# Patient Record
Sex: MALE | Race: WHITE | Employment: FULL TIME | URBAN - NONMETROPOLITAN AREA
[De-identification: names, ages, dates, MRNs, and addresses within clinical notes are randomized per-mention and may not be internally consistent; named-entity substitution may affect disease eponyms.]

---

## 2021-07-18 ENCOUNTER — HOSPITAL ENCOUNTER (INPATIENT)
Age: 51
LOS: 4 days | Discharge: HOME | DRG: 417 | End: 2021-07-22
Attending: INTERNAL MEDICINE | Admitting: INTERNAL MEDICINE
Payer: COMMERCIAL

## 2021-07-18 ENCOUNTER — APPOINTMENT (OUTPATIENT)
Dept: CT IMAGING | Age: 51
DRG: 417 | End: 2021-07-18
Payer: COMMERCIAL

## 2021-07-18 ENCOUNTER — APPOINTMENT (OUTPATIENT)
Dept: GENERAL RADIOLOGY | Age: 51
DRG: 417 | End: 2021-07-18
Payer: COMMERCIAL

## 2021-07-18 DIAGNOSIS — Z90.49 S/P LAPAROSCOPIC CHOLECYSTECTOMY: ICD-10-CM

## 2021-07-18 DIAGNOSIS — K80.20 CALCULUS OF GALLBLADDER WITHOUT CHOLECYSTITIS WITHOUT OBSTRUCTION: ICD-10-CM

## 2021-07-18 DIAGNOSIS — K85.90 ACUTE PANCREATITIS WITHOUT INFECTION OR NECROSIS, UNSPECIFIED PANCREATITIS TYPE: Primary | ICD-10-CM

## 2021-07-18 PROBLEM — K85.10 PANCREATITIS, GALLSTONE: Status: ACTIVE | Noted: 2021-07-18

## 2021-07-18 LAB
-: ABNORMAL
ABSOLUTE EOS #: 0.23 K/UL (ref 0–0.44)
ABSOLUTE IMMATURE GRANULOCYTE: 0.05 K/UL (ref 0–0.3)
ABSOLUTE LYMPH #: 1.03 K/UL (ref 1.1–3.7)
ABSOLUTE MONO #: 1.28 K/UL (ref 0.1–1.2)
ALBUMIN SERPL-MCNC: 4.4 G/DL (ref 3.5–5.2)
ALBUMIN/GLOBULIN RATIO: 1.2 (ref 1–2.5)
ALP BLD-CCNC: 76 U/L (ref 40–129)
ALT SERPL-CCNC: 11 U/L (ref 5–41)
AMORPHOUS: ABNORMAL
AMYLASE: 67 U/L (ref 28–100)
ANION GAP SERPL CALCULATED.3IONS-SCNC: 13 MMOL/L (ref 9–17)
AST SERPL-CCNC: 25 U/L
BACTERIA: ABNORMAL
BASOPHILS # BLD: 0 % (ref 0–2)
BASOPHILS ABSOLUTE: 0.03 K/UL (ref 0–0.2)
BILIRUB SERPL-MCNC: 1.6 MG/DL (ref 0.3–1.2)
BILIRUBIN URINE: NEGATIVE
BUN BLDV-MCNC: 10 MG/DL (ref 6–20)
BUN/CREAT BLD: 17 (ref 9–20)
CALCIUM SERPL-MCNC: 9.3 MG/DL (ref 8.6–10.4)
CASTS UA: ABNORMAL /LPF
CHLORIDE BLD-SCNC: 95 MMOL/L (ref 98–107)
CO2: 23 MMOL/L (ref 20–31)
COLOR: YELLOW
COMMENT UA: ABNORMAL
CREAT SERPL-MCNC: 0.59 MG/DL (ref 0.7–1.2)
CRYSTALS, UA: ABNORMAL /HPF
DIFFERENTIAL TYPE: ABNORMAL
EOSINOPHILS RELATIVE PERCENT: 2 % (ref 1–4)
EPITHELIAL CELLS UA: ABNORMAL /HPF (ref 0–5)
GFR AFRICAN AMERICAN: >60 ML/MIN
GFR NON-AFRICAN AMERICAN: >60 ML/MIN
GFR SERPL CREATININE-BSD FRML MDRD: ABNORMAL ML/MIN/{1.73_M2}
GFR SERPL CREATININE-BSD FRML MDRD: ABNORMAL ML/MIN/{1.73_M2}
GLUCOSE BLD-MCNC: 104 MG/DL (ref 70–99)
GLUCOSE URINE: NEGATIVE
HCT VFR BLD CALC: 44.8 % (ref 40.7–50.3)
HEMOGLOBIN: 15.5 G/DL (ref 13–17)
IMMATURE GRANULOCYTES: 0 %
KETONES, URINE: NEGATIVE
LACTIC ACID, WHOLE BLOOD: NORMAL MMOL/L (ref 0.7–2.1)
LACTIC ACID: 0.7 MMOL/L (ref 0.5–2.2)
LEUKOCYTE ESTERASE, URINE: NEGATIVE
LIPASE: 120 U/L (ref 13–60)
LYMPHOCYTES # BLD: 9 % (ref 24–43)
MCH RBC QN AUTO: 31 PG (ref 25.2–33.5)
MCHC RBC AUTO-ENTMCNC: 34.6 G/DL (ref 28.4–34.8)
MCV RBC AUTO: 89.6 FL (ref 82.6–102.9)
MONOCYTES # BLD: 11 % (ref 3–12)
MUCUS: ABNORMAL
NITRITE, URINE: NEGATIVE
NRBC AUTOMATED: 0 PER 100 WBC
OTHER OBSERVATIONS UA: ABNORMAL
PDW BLD-RTO: 12.9 % (ref 11.8–14.4)
PH UA: 7 (ref 5–9)
PLATELET # BLD: 301 K/UL (ref 138–453)
PLATELET ESTIMATE: ABNORMAL
PMV BLD AUTO: 10.3 FL (ref 8.1–13.5)
POTASSIUM SERPL-SCNC: 4.9 MMOL/L (ref 3.7–5.3)
PROTEIN UA: ABNORMAL
RBC # BLD: 5 M/UL (ref 4.21–5.77)
RBC # BLD: ABNORMAL 10*6/UL
RBC UA: ABNORMAL /HPF (ref 0–2)
RENAL EPITHELIAL, UA: ABNORMAL /HPF
SEG NEUTROPHILS: 78 % (ref 36–65)
SEGMENTED NEUTROPHILS ABSOLUTE COUNT: 9.46 K/UL (ref 1.5–8.1)
SODIUM BLD-SCNC: 131 MMOL/L (ref 135–144)
SPECIFIC GRAVITY UA: 1.01 (ref 1.01–1.02)
TOTAL PROTEIN: 8.1 G/DL (ref 6.4–8.3)
TRICHOMONAS: ABNORMAL
TURBIDITY: CLEAR
URINE HGB: NEGATIVE
UROBILINOGEN, URINE: NORMAL
WBC # BLD: 12.1 K/UL (ref 3.5–11.3)
WBC # BLD: ABNORMAL 10*3/UL
WBC UA: ABNORMAL /HPF (ref 0–5)
YEAST: ABNORMAL

## 2021-07-18 PROCEDURE — 87186 SC STD MICRODIL/AGAR DIL: CPT

## 2021-07-18 PROCEDURE — 2500000003 HC RX 250 WO HCPCS: Performed by: NURSE PRACTITIONER

## 2021-07-18 PROCEDURE — 87040 BLOOD CULTURE FOR BACTERIA: CPT

## 2021-07-18 PROCEDURE — 87088 URINE BACTERIA CULTURE: CPT

## 2021-07-18 PROCEDURE — 94761 N-INVAS EAR/PLS OXIMETRY MLT: CPT

## 2021-07-18 PROCEDURE — 82150 ASSAY OF AMYLASE: CPT

## 2021-07-18 PROCEDURE — 1200000000 HC SEMI PRIVATE

## 2021-07-18 PROCEDURE — 80053 COMPREHEN METABOLIC PANEL: CPT

## 2021-07-18 PROCEDURE — 87086 URINE CULTURE/COLONY COUNT: CPT

## 2021-07-18 PROCEDURE — 36415 COLL VENOUS BLD VENIPUNCTURE: CPT

## 2021-07-18 PROCEDURE — 2580000003 HC RX 258: Performed by: NURSE PRACTITIONER

## 2021-07-18 PROCEDURE — 99284 EMERGENCY DEPT VISIT MOD MDM: CPT

## 2021-07-18 PROCEDURE — 96374 THER/PROPH/DIAG INJ IV PUSH: CPT

## 2021-07-18 PROCEDURE — 71046 X-RAY EXAM CHEST 2 VIEWS: CPT

## 2021-07-18 PROCEDURE — 6360000004 HC RX CONTRAST MEDICATION: Performed by: NURSE PRACTITIONER

## 2021-07-18 PROCEDURE — 85025 COMPLETE CBC W/AUTO DIFF WBC: CPT

## 2021-07-18 PROCEDURE — 6360000002 HC RX W HCPCS: Performed by: NURSE PRACTITIONER

## 2021-07-18 PROCEDURE — 83690 ASSAY OF LIPASE: CPT

## 2021-07-18 PROCEDURE — 96375 TX/PRO/DX INJ NEW DRUG ADDON: CPT

## 2021-07-18 PROCEDURE — 74177 CT ABD & PELVIS W/CONTRAST: CPT

## 2021-07-18 PROCEDURE — 87077 CULTURE AEROBIC IDENTIFY: CPT

## 2021-07-18 PROCEDURE — 81001 URINALYSIS AUTO W/SCOPE: CPT

## 2021-07-18 PROCEDURE — 83605 ASSAY OF LACTIC ACID: CPT

## 2021-07-18 RX ORDER — TERBINAFINE HYDROCHLORIDE 250 MG/1
250 TABLET ORAL DAILY
COMMUNITY

## 2021-07-18 RX ORDER — POLYETHYLENE GLYCOL 3350 17 G/17G
17 POWDER, FOR SOLUTION ORAL DAILY PRN
Status: DISCONTINUED | OUTPATIENT
Start: 2021-07-18 | End: 2021-07-20

## 2021-07-18 RX ORDER — MORPHINE SULFATE 4 MG/ML
4 INJECTION, SOLUTION INTRAMUSCULAR; INTRAVENOUS EVERY 4 HOURS PRN
Status: DISCONTINUED | OUTPATIENT
Start: 2021-07-18 | End: 2021-07-22 | Stop reason: HOSPADM

## 2021-07-18 RX ORDER — SODIUM CHLORIDE 9 MG/ML
25 INJECTION, SOLUTION INTRAVENOUS PRN
Status: DISCONTINUED | OUTPATIENT
Start: 2021-07-18 | End: 2021-07-22 | Stop reason: HOSPADM

## 2021-07-18 RX ORDER — LISINOPRIL 10 MG/1
10 TABLET ORAL 2 TIMES DAILY
COMMUNITY

## 2021-07-18 RX ORDER — MORPHINE SULFATE 4 MG/ML
4 INJECTION, SOLUTION INTRAMUSCULAR; INTRAVENOUS EVERY 4 HOURS PRN
Status: COMPLETED | OUTPATIENT
Start: 2021-07-18 | End: 2021-07-18

## 2021-07-18 RX ORDER — ONDANSETRON 4 MG/1
4 TABLET, ORALLY DISINTEGRATING ORAL EVERY 8 HOURS PRN
Status: DISCONTINUED | OUTPATIENT
Start: 2021-07-18 | End: 2021-07-22 | Stop reason: HOSPADM

## 2021-07-18 RX ORDER — SODIUM CHLORIDE 9 MG/ML
INJECTION, SOLUTION INTRAVENOUS CONTINUOUS
Status: DISCONTINUED | OUTPATIENT
Start: 2021-07-18 | End: 2021-07-22 | Stop reason: HOSPADM

## 2021-07-18 RX ORDER — ACETAMINOPHEN 650 MG/1
650 SUPPOSITORY RECTAL EVERY 6 HOURS PRN
Status: DISCONTINUED | OUTPATIENT
Start: 2021-07-18 | End: 2021-07-22 | Stop reason: HOSPADM

## 2021-07-18 RX ORDER — ENALAPRILAT 2.5 MG/2ML
1.25 INJECTION INTRAVENOUS EVERY 6 HOURS PRN
Status: DISCONTINUED | OUTPATIENT
Start: 2021-07-18 | End: 2021-07-22 | Stop reason: HOSPADM

## 2021-07-18 RX ORDER — SODIUM CHLORIDE 0.9 % (FLUSH) 0.9 %
10 SYRINGE (ML) INJECTION PRN
Status: DISCONTINUED | OUTPATIENT
Start: 2021-07-18 | End: 2021-07-22 | Stop reason: HOSPADM

## 2021-07-18 RX ORDER — ENALAPRILAT 2.5 MG/2ML
1.25 INJECTION INTRAVENOUS ONCE
Status: COMPLETED | OUTPATIENT
Start: 2021-07-18 | End: 2021-07-18

## 2021-07-18 RX ORDER — LABETALOL HYDROCHLORIDE 5 MG/ML
10 INJECTION, SOLUTION INTRAVENOUS EVERY 6 HOURS PRN
Status: DISCONTINUED | OUTPATIENT
Start: 2021-07-18 | End: 2021-07-22 | Stop reason: HOSPADM

## 2021-07-18 RX ORDER — ONDANSETRON 2 MG/ML
4 INJECTION INTRAMUSCULAR; INTRAVENOUS EVERY 6 HOURS PRN
Status: DISCONTINUED | OUTPATIENT
Start: 2021-07-18 | End: 2021-07-22 | Stop reason: HOSPADM

## 2021-07-18 RX ORDER — SODIUM CHLORIDE 0.9 % (FLUSH) 0.9 %
10 SYRINGE (ML) INJECTION EVERY 12 HOURS SCHEDULED
Status: DISCONTINUED | OUTPATIENT
Start: 2021-07-18 | End: 2021-07-22 | Stop reason: HOSPADM

## 2021-07-18 RX ORDER — ACETAMINOPHEN 325 MG/1
650 TABLET ORAL EVERY 6 HOURS PRN
Status: DISCONTINUED | OUTPATIENT
Start: 2021-07-18 | End: 2021-07-22 | Stop reason: HOSPADM

## 2021-07-18 RX ORDER — METOPROLOL TARTRATE 5 MG/5ML
5 INJECTION INTRAVENOUS ONCE
Status: COMPLETED | OUTPATIENT
Start: 2021-07-18 | End: 2021-07-18

## 2021-07-18 RX ORDER — ONDANSETRON 2 MG/ML
4 INJECTION INTRAMUSCULAR; INTRAVENOUS ONCE
Status: COMPLETED | OUTPATIENT
Start: 2021-07-18 | End: 2021-07-18

## 2021-07-18 RX ADMIN — ONDANSETRON 4 MG: 2 INJECTION INTRAMUSCULAR; INTRAVENOUS at 18:38

## 2021-07-18 RX ADMIN — PIPERACILLIN AND TAZOBACTAM 3375 MG: 3; .375 INJECTION, POWDER, FOR SOLUTION INTRAVENOUS at 22:11

## 2021-07-18 RX ADMIN — ENALAPRILAT 1.25 MG: 1.25 INJECTION INTRAVENOUS at 19:18

## 2021-07-18 RX ADMIN — SODIUM CHLORIDE: 9 INJECTION, SOLUTION INTRAVENOUS at 18:37

## 2021-07-18 RX ADMIN — FAMOTIDINE 20 MG: 10 INJECTION, SOLUTION INTRAVENOUS at 18:37

## 2021-07-18 RX ADMIN — MORPHINE SULFATE 4 MG: 4 INJECTION, SOLUTION INTRAMUSCULAR; INTRAVENOUS at 22:49

## 2021-07-18 RX ADMIN — METOPROLOL TARTRATE 5 MG: 5 INJECTION INTRAVENOUS at 20:23

## 2021-07-18 RX ADMIN — IOPAMIDOL 75 ML: 755 INJECTION, SOLUTION INTRAVENOUS at 19:04

## 2021-07-18 RX ADMIN — MORPHINE SULFATE 4 MG: 4 INJECTION, SOLUTION INTRAMUSCULAR; INTRAVENOUS at 18:38

## 2021-07-18 ASSESSMENT — PAIN DESCRIPTION - ORIENTATION
ORIENTATION: RIGHT
ORIENTATION: RIGHT

## 2021-07-18 ASSESSMENT — PAIN SCALES - GENERAL
PAINLEVEL_OUTOF10: 7
PAINLEVEL_OUTOF10: 5
PAINLEVEL_OUTOF10: 8

## 2021-07-18 ASSESSMENT — PAIN DESCRIPTION - LOCATION
LOCATION: ABDOMEN
LOCATION: ABDOMEN

## 2021-07-18 ASSESSMENT — ENCOUNTER SYMPTOMS
EYES NEGATIVE: 1
ABDOMINAL PAIN: 1
CONSTIPATION: 1
NAUSEA: 1

## 2021-07-18 ASSESSMENT — PAIN DESCRIPTION - PAIN TYPE
TYPE: ACUTE PAIN
TYPE: ACUTE PAIN

## 2021-07-18 NOTE — ED PROVIDER NOTES
[facility] ED  EMERGENCY DEPARTMENT ENCOUNTER      Pt Name: Jared Lucas  MRN: [id number]  [place] 1970  Date of evaluation: 7/18/2021  Provider: KEARA Horner CNP    CHIEF COMPLAINT     Chief Complaint   Patient presents with    Abdominal Pain     RUQ, RLQ pain         HISTORY OF PRESENT ILLNESS   (Location/Symptom, Timing/Onset, Context/Setting,Quality, Duration, Modifying Factors, Severity)  Note limiting factors. Jared Lucas is a49 y.o. male who presents to the emergency department      With his wife with complaints of right upper quadrant abdominal pain and epigastric pain with radiation to his back. Patient stated pain began Saturday morning when he got off work. At that time he complained of it as a dull pain he states now the pain is increased and waxes and wanes with sharp sensation. Patient currently is sitting on the bedside hold his right upper quadrant. States the pain is just below his ribs. Complained of chills but unsure if he had fever. Complains of some nausea without vomiting. States he has never had pain like this before. Denies drugs. Does state he drinks alcohol daily. States he has not had any alcohol since Saturday. Patient states he has a history of hypertension. Currently systolic is 038. Nursing Notes werereviewed. REVIEW OF SYSTEMS    (2-9 systems for level 4, 10 or more for level 5)     Review of Systems   Constitutional: Positive for chills. HENT: Negative. Eyes: Negative. Cardiovascular: Negative. Gastrointestinal: Positive for abdominal pain, constipation and nausea. Endocrine: Negative. Genitourinary: Negative. Musculoskeletal: Negative. Skin: Negative. Neurological: Negative. Psychiatric/Behavioral: Negative. Except as noted above the remainder of the review of systems was reviewed and negative.        PAST MEDICAL HISTORY     Past Medical History:   Diagnosis Date    Hypertension Pulse Resp SpO2 Height Weight   -- 97.6 °F (36.4 °C) -- -- -- -- -- --       Physical Exam  Constitutional:       General: He is in acute distress. Appearance: He is well-developed and normal weight. He is not ill-appearing. HENT:      Head: Normocephalic. Mouth/Throat:      Mouth: Mucous membranes are moist.      Pharynx: Oropharynx is clear. Cardiovascular:      Rate and Rhythm: Normal rate and regular rhythm. Heart sounds: Normal heart sounds. No murmur heard. Pulmonary:      Effort: Pulmonary effort is normal.      Breath sounds: Normal breath sounds. Abdominal:      General: Bowel sounds are normal. There is no distension or abdominal bruit. Palpations: There is no hepatomegaly or splenomegaly. Tenderness: There is abdominal tenderness in the right upper quadrant and epigastric area. Positive signs include Martinez's sign. Hernia: There is no hernia in the umbilical area or ventral area. Skin:     General: Skin is warm and dry. Capillary Refill: Capillary refill takes less than 2 seconds. Neurological:      General: No focal deficit present. Mental Status: He is alert. Psychiatric:         Mood and Affect: Mood normal.         DIAGNOSTIC RESULTS     EKG: All EKG's are interpreted by the Emergency Department Physician who either signs orCo-signs this chart in the absence of a cardiologist.        RADIOLOGY:   plain film images such as CT, Ultrasound and MRI are read by the radiologist. Plain radiographic images are visualized and preliminarily interpreted by the emergency physician with the below findings:        Interpretation per the Radiologist below, ifavailable at the time of this note:    CT ABDOMEN PELVIS W IV CONTRAST Additional Contrast? None   Final Result   Head of the pancreas appears mildly enlarged with suggestion of mild   peripancreatic inflammatory changes which may also be associated with the   duodenum.   Finding may represent mild pancreatitis or duodenitis. Please   correlate with labs      Cholelithiasis      Mild hepatosplenomegaly         XR CHEST (2 VW)   Final Result   Mild cardiomegaly. Interstitial opacities compatible mild pulmonary edema or   interstitial pneumonia. ED BEDSIDE ULTRASOUND:   Performed by ED Physician - none    LABS:  Labs Reviewed   CBC WITH AUTO DIFFERENTIAL - Abnormal; Notable for the following components:       Result Value    WBC 12.1 (*)     Seg Neutrophils 78 (*)     Lymphocytes 9 (*)     Segs Absolute 9.46 (*)     Absolute Lymph # 1.03 (*)     Absolute Mono # 1.28 (*)     All other components within normal limits   COMPREHENSIVE METABOLIC PANEL W/ REFLEX TO MG FOR LOW K - Abnormal; Notable for the following components:    Glucose 104 (*)     CREATININE 0.59 (*)     Sodium 131 (*)     Chloride 95 (*)     Total Bilirubin 1.60 (*)     All other components within normal limits   LIPASE - Abnormal; Notable for the following components:    Lipase 120 (*)     All other components within normal limits   CULTURE, URINE   CULTURE, BLOOD 1   CULTURE, BLOOD 2   AMYLASE   URINALYSIS   LACTIC ACID, PLASMA       All other labs were within normal range ornot returned as of this dictation. EMERGENCY DEPARTMENT COURSE and DIFFERENTIAL DIAGNOSIS/MDM:   Vitals:    Vitals:    07/18/21 2015 07/18/21 2020 07/18/21 2030 07/18/21 2045   BP: (!) 196/107  (!) 178/102 (!) 186/104   Pulse:  94     Temp:       SpO2: 98%  99% 100%           MDM  Number of Diagnoses or Management Options     Amount and/or Complexity of Data Reviewed  Clinical lab tests: ordered  Tests in the radiology section of CPT®: ordered         CRITICAL CARE TIME   Total CriticalCare time was 0 minutes, excluding separately reportable procedures. There was a high probability of clinically significant/life threatening deterioration in the patient's condition which required my urgent intervention.       CONSULTS:  IP CONSULT TO CASE MANAGEMENT  IP CONSULT TO GENERAL SURGERY    PROCEDURES:  Unlessotherwise noted below, none     Procedures    FINAL IMPRESSION      1. Acute pancreatitis without infection or necrosis, unspecified pancreatitis type Stable   2. Calculus of gallbladder without cholecystitis without obstruction Stable         DISPOSITION/PLAN   DISPOSITION Decision To Admit 07/18/2021 09:02:17 PM      PATIENT REFERRED TO:  No follow-up provider specified.     DISCHARGE MEDICATIONS:  Current Discharge Medication List                 (Please note that portions of this note were completed with a voice recognition program.  Efforts were made to edit the dictations but occasionally words are mis-transcribed.)      KEARA Weinberg CNP (electronically signed)  Attending Emergency Provider       KEARA Weinberg CNP  07/18/21 [id number]

## 2021-07-18 NOTE — Clinical Note
Patient Class: Inpatient [101]   REQUIRED: Diagnosis: Pancreatitis, gallstone [439948]   Estimated Length of Stay: Estimated stay of more than 2 midnights   Admitting Provider: Homero Coyne [[id number]]   Telemetry/Cardiac Monitoring Required?: Yes

## 2021-07-18 NOTE — ED NOTES
Pt states that he does not feel that he can give urine at this time     Radha SUNDAR Flores  07/18/21 [id number]

## 2021-07-18 NOTE — LETTER
[facility] MED [facility]G  [address] [place] [address] Dr DREA Velasquez [phone]  Phone: [phone]             July 22, 2021    Patient: Adalberto Chawla   YOB: 1970   Date of Visit: 7/18/2021       To Whom It May Concern:    Lizy Luu was seen and treated in our facility  beginning 7/18/2021 until 7/21/21. He may not return to work until released by Dr. Anaid Herrera. Follow up appointment with Dr. Anaid Herrera is on 7/27/21.    Sincerely,       Libertad Sanchez RN         Signature:__________________________________

## 2021-07-19 ENCOUNTER — APPOINTMENT (OUTPATIENT)
Dept: ULTRASOUND IMAGING | Age: 51
DRG: 417 | End: 2021-07-19
Payer: COMMERCIAL

## 2021-07-19 ENCOUNTER — APPOINTMENT (OUTPATIENT)
Dept: NON INVASIVE DIAGNOSTICS | Age: 51
DRG: 417 | End: 2021-07-19
Payer: COMMERCIAL

## 2021-07-19 ENCOUNTER — APPOINTMENT (OUTPATIENT)
Dept: MRI IMAGING | Age: 51
DRG: 417 | End: 2021-07-19
Payer: COMMERCIAL

## 2021-07-19 LAB
ABSOLUTE EOS #: 0.42 K/UL (ref 0–0.44)
ABSOLUTE IMMATURE GRANULOCYTE: 0 K/UL (ref 0–0.3)
ABSOLUTE LYMPH #: 0.83 K/UL (ref 1.1–3.7)
ABSOLUTE MONO #: 0.7 K/UL (ref 0.1–1.2)
ALBUMIN SERPL-MCNC: 3.8 G/DL (ref 3.5–5.2)
ALBUMIN/GLOBULIN RATIO: 1.2 (ref 1–2.5)
ALP BLD-CCNC: 73 U/L (ref 40–129)
ALT SERPL-CCNC: 8 U/L (ref 5–41)
ANION GAP SERPL CALCULATED.3IONS-SCNC: 12 MMOL/L (ref 9–17)
AST SERPL-CCNC: 10 U/L
BASOPHILS # BLD: 0 % (ref 0–2)
BASOPHILS ABSOLUTE: 0 K/UL (ref 0–0.2)
BILIRUB SERPL-MCNC: 1.44 MG/DL (ref 0.3–1.2)
BUN BLDV-MCNC: 10 MG/DL (ref 6–20)
BUN/CREAT BLD: 18 (ref 9–20)
CALCIUM SERPL-MCNC: 8.6 MG/DL (ref 8.6–10.4)
CHLORIDE BLD-SCNC: 100 MMOL/L (ref 98–107)
CHOLESTEROL/HDL RATIO: 3.5
CHOLESTEROL: 166 MG/DL
CO2: 22 MMOL/L (ref 20–31)
CREAT SERPL-MCNC: 0.55 MG/DL (ref 0.7–1.2)
DIFFERENTIAL TYPE: ABNORMAL
EOSINOPHILS RELATIVE PERCENT: 3 % (ref 1–4)
GFR AFRICAN AMERICAN: >60 ML/MIN
GFR NON-AFRICAN AMERICAN: >60 ML/MIN
GFR SERPL CREATININE-BSD FRML MDRD: ABNORMAL ML/MIN/{1.73_M2}
GFR SERPL CREATININE-BSD FRML MDRD: ABNORMAL ML/MIN/{1.73_M2}
GLUCOSE BLD-MCNC: 122 MG/DL (ref 70–99)
HCT VFR BLD CALC: 41.8 % (ref 40.7–50.3)
HDLC SERPL-MCNC: 47 MG/DL
HEMOGLOBIN: 14 G/DL (ref 13–17)
IMMATURE GRANULOCYTES: 0 %
LDL CHOLESTEROL: 106 MG/DL (ref 0–130)
LIPASE: 62 U/L (ref 13–60)
LV EF: 55 %
LVEF MODALITY: NORMAL
LYMPHOCYTES # BLD: 6 % (ref 24–43)
MCH RBC QN AUTO: 30.8 PG (ref 25.2–33.5)
MCHC RBC AUTO-ENTMCNC: 33.5 G/DL (ref 28.4–34.8)
MCV RBC AUTO: 91.9 FL (ref 82.6–102.9)
MONOCYTES # BLD: 5 % (ref 3–12)
MORPHOLOGY: NORMAL
NRBC AUTOMATED: 0 PER 100 WBC
PDW BLD-RTO: 12.8 % (ref 11.8–14.4)
PLATELET # BLD: 249 K/UL (ref 138–453)
PLATELET ESTIMATE: ABNORMAL
PMV BLD AUTO: 9.7 FL (ref 8.1–13.5)
POTASSIUM SERPL-SCNC: 4.2 MMOL/L (ref 3.7–5.3)
RBC # BLD: 4.55 M/UL (ref 4.21–5.77)
RBC # BLD: ABNORMAL 10*6/UL
SEG NEUTROPHILS: 86 % (ref 36–65)
SEGMENTED NEUTROPHILS ABSOLUTE COUNT: 11.95 K/UL (ref 1.5–8.1)
SODIUM BLD-SCNC: 134 MMOL/L (ref 135–144)
TOTAL PROTEIN: 6.9 G/DL (ref 6.4–8.3)
TRIGL SERPL-MCNC: 67 MG/DL
VLDLC SERPL CALC-MCNC: NORMAL MG/DL (ref 1–30)
WBC # BLD: 13.9 K/UL (ref 3.5–11.3)
WBC # BLD: ABNORMAL 10*3/UL

## 2021-07-19 PROCEDURE — 74181 MRI ABDOMEN W/O CONTRAST: CPT

## 2021-07-19 PROCEDURE — 6360000002 HC RX W HCPCS: Performed by: NURSE PRACTITIONER

## 2021-07-19 PROCEDURE — 2580000003 HC RX 258: Performed by: NURSE PRACTITIONER

## 2021-07-19 PROCEDURE — 76705 ECHO EXAM OF ABDOMEN: CPT

## 2021-07-19 PROCEDURE — 94761 N-INVAS EAR/PLS OXIMETRY MLT: CPT

## 2021-07-19 PROCEDURE — 80053 COMPREHEN METABOLIC PANEL: CPT

## 2021-07-19 PROCEDURE — 85025 COMPLETE CBC W/AUTO DIFF WBC: CPT

## 2021-07-19 PROCEDURE — 2500000003 HC RX 250 WO HCPCS: Performed by: NURSE PRACTITIONER

## 2021-07-19 PROCEDURE — 83690 ASSAY OF LIPASE: CPT

## 2021-07-19 PROCEDURE — 99253 IP/OBS CNSLTJ NEW/EST LOW 45: CPT | Performed by: SURGERY

## 2021-07-19 PROCEDURE — 36415 COLL VENOUS BLD VENIPUNCTURE: CPT

## 2021-07-19 PROCEDURE — 6360000002 HC RX W HCPCS: Performed by: INTERNAL MEDICINE

## 2021-07-19 PROCEDURE — 6370000000 HC RX 637 (ALT 250 FOR IP): Performed by: NURSE PRACTITIONER

## 2021-07-19 PROCEDURE — 93005 ELECTROCARDIOGRAM TRACING: CPT | Performed by: NURSE PRACTITIONER

## 2021-07-19 PROCEDURE — 1200000000 HC SEMI PRIVATE

## 2021-07-19 PROCEDURE — 93306 TTE W/DOPPLER COMPLETE: CPT

## 2021-07-19 PROCEDURE — 80061 LIPID PANEL: CPT

## 2021-07-19 RX ORDER — LISINOPRIL 10 MG/1
10 TABLET ORAL 2 TIMES DAILY
Status: DISCONTINUED | OUTPATIENT
Start: 2021-07-19 | End: 2021-07-22 | Stop reason: HOSPADM

## 2021-07-19 RX ORDER — HYDROCODONE BITARTRATE AND ACETAMINOPHEN 5; 325 MG/1; MG/1
1 TABLET ORAL EVERY 4 HOURS PRN
Status: DISCONTINUED | OUTPATIENT
Start: 2021-07-19 | End: 2021-07-22 | Stop reason: HOSPADM

## 2021-07-19 RX ORDER — HYDROCODONE BITARTRATE AND ACETAMINOPHEN 5; 325 MG/1; MG/1
2 TABLET ORAL EVERY 4 HOURS PRN
Status: DISCONTINUED | OUTPATIENT
Start: 2021-07-19 | End: 2021-07-22 | Stop reason: HOSPADM

## 2021-07-19 RX ORDER — NIFEDIPINE 30 MG/1
30 TABLET, FILM COATED, EXTENDED RELEASE ORAL DAILY
Status: DISCONTINUED | OUTPATIENT
Start: 2021-07-19 | End: 2021-07-22 | Stop reason: HOSPADM

## 2021-07-19 RX ADMIN — LABETALOL HYDROCHLORIDE 10 MG: 5 INJECTION INTRAVENOUS at 15:06

## 2021-07-19 RX ADMIN — LABETALOL HYDROCHLORIDE 10 MG: 5 INJECTION INTRAVENOUS at 00:07

## 2021-07-19 RX ADMIN — LISINOPRIL 10 MG: 10 TABLET ORAL at 09:55

## 2021-07-19 RX ADMIN — MORPHINE SULFATE 4 MG: 4 INJECTION, SOLUTION INTRAMUSCULAR; INTRAVENOUS at 10:25

## 2021-07-19 RX ADMIN — POLYETHYLENE GLYCOL (3350) 17 G: 17 POWDER, FOR SOLUTION ORAL at 23:18

## 2021-07-19 RX ADMIN — SODIUM CHLORIDE: 9 INJECTION, SOLUTION INTRAVENOUS at 20:25

## 2021-07-19 RX ADMIN — HYDROCODONE BITARTRATE AND ACETAMINOPHEN 1 TABLET: 5; 325 TABLET ORAL at 15:06

## 2021-07-19 RX ADMIN — ENALAPRILAT 1.25 MG: 1.25 INJECTION INTRAVENOUS at 03:00

## 2021-07-19 RX ADMIN — NIFEDIPINE 30 MG: 30 TABLET, EXTENDED RELEASE ORAL at 13:51

## 2021-07-19 RX ADMIN — HYDROCODONE BITARTRATE AND ACETAMINOPHEN 1 TABLET: 5; 325 TABLET ORAL at 23:12

## 2021-07-19 RX ADMIN — LISINOPRIL 10 MG: 10 TABLET ORAL at 20:26

## 2021-07-19 RX ADMIN — PIPERACILLIN AND TAZOBACTAM 3375 MG: 3; .375 INJECTION, POWDER, FOR SOLUTION INTRAVENOUS at 07:39

## 2021-07-19 RX ADMIN — SODIUM CHLORIDE: 9 INJECTION, SOLUTION INTRAVENOUS at 11:59

## 2021-07-19 RX ADMIN — SODIUM CHLORIDE: 9 INJECTION, SOLUTION INTRAVENOUS at 03:48

## 2021-07-19 RX ADMIN — PIPERACILLIN AND TAZOBACTAM 3375 MG: 3; .375 INJECTION, POWDER, FOR SOLUTION INTRAVENOUS at 17:08

## 2021-07-19 RX ADMIN — MORPHINE SULFATE 4 MG: 4 INJECTION, SOLUTION INTRAMUSCULAR; INTRAVENOUS at 04:34

## 2021-07-19 RX ADMIN — ENALAPRILAT 1.25 MG: 1.25 INJECTION INTRAVENOUS at 12:43

## 2021-07-19 RX ADMIN — ENOXAPARIN SODIUM 40 MG: 40 INJECTION SUBCUTANEOUS at 08:15

## 2021-07-19 RX ADMIN — LABETALOL HYDROCHLORIDE 10 MG: 5 INJECTION INTRAVENOUS at 07:35

## 2021-07-19 ASSESSMENT — PAIN DESCRIPTION - PROGRESSION
CLINICAL_PROGRESSION: GRADUALLY IMPROVING
CLINICAL_PROGRESSION: GRADUALLY WORSENING

## 2021-07-19 ASSESSMENT — PAIN DESCRIPTION - FREQUENCY
FREQUENCY: CONTINUOUS
FREQUENCY: CONTINUOUS
FREQUENCY: INTERMITTENT
FREQUENCY: CONTINUOUS

## 2021-07-19 ASSESSMENT — PAIN DESCRIPTION - PAIN TYPE
TYPE: ACUTE PAIN

## 2021-07-19 ASSESSMENT — PAIN SCALES - GENERAL
PAINLEVEL_OUTOF10: 4
PAINLEVEL_OUTOF10: 8
PAINLEVEL_OUTOF10: 6
PAINLEVEL_OUTOF10: 4
PAINLEVEL_OUTOF10: 3
PAINLEVEL_OUTOF10: 2
PAINLEVEL_OUTOF10: 4
PAINLEVEL_OUTOF10: 0
PAINLEVEL_OUTOF10: 5
PAINLEVEL_OUTOF10: 3

## 2021-07-19 ASSESSMENT — PAIN DESCRIPTION - LOCATION
LOCATION: ABDOMEN

## 2021-07-19 ASSESSMENT — PAIN DESCRIPTION - ORIENTATION
ORIENTATION: RIGHT
ORIENTATION: RIGHT;UPPER

## 2021-07-19 ASSESSMENT — PAIN DESCRIPTION - DESCRIPTORS
DESCRIPTORS: ACHING;DISCOMFORT
DESCRIPTORS: ACHING
DESCRIPTORS: ACHING
DESCRIPTORS: DISCOMFORT

## 2021-07-19 NOTE — PLAN OF CARE
Problem: Pain:  Goal: Control of acute pain  Description: Control of acute pain  Outcome: Ongoing  Note: Pain assessments provided. Pt denies any pain thus far this shift. Problem: Fluid Volume:  Goal: Will maintain adequate fluid volume  Description: Will maintain adequate fluid volume  7/19/2021 1958 by Emma Pratt RN  Outcome: Ongoing  Note: IV fluids infusing. I&O monitored. Problem: Physical Regulation:  Goal: Hemodynamic stability will improve  Description: Hemodynamic stability will improve  7/19/2021 1958 by Emma Pratt RN  Outcome: Ongoing  Note: Labs monitored.

## 2021-07-19 NOTE — PROGRESS NOTES
Initial shift assessment done and VS obtained as charted in flow sheet. Pt is A&Ox4, answers questions appropriately. BP improved to 145/89. Pt denies any pain at this time. Pt is currently watching television in bed, denies any current needs. Call light and bedside table remain within reach. Will monitor.

## 2021-07-19 NOTE — PLAN OF CARE
Problem: Pain:  Goal: Pain level will decrease  Outcome: Ongoing  Note: Pain level increased t/o shift from minimal to moderate. Pain med changed from IV to oral in hope of better pain management. Problem: Fluid Volume:  Goal: Will maintain adequate fluid volume  Outcome: Ongoing  Note: Patient currently on clear liquid diet d/t diagnosis; tolerating fair at this time. Patient receiving continuous IV fluids to aid in adequate fluid volume. Problem: Nutritional:  Goal: Ability to achieve adequate nutritional intake will improve  Outcome: Ongoing  Note: Patient tolerating clear liquid diet fair. Will assess next day for diet advancement if continues to tolerate clear liquids and lipase decreases again. Problem: Physical Regulation:  Goal: Hemodynamic stability will improve  Outcome: Ongoing  Note: Patient's b/p remains elevated, even with current PRN med availability. Patient restarted on home med lisinopril, and nifedipine added to help in decreasing b/p.

## 2021-07-19 NOTE — PROGRESS NOTES
Patient to room per wheel chair. Patient ambulated independently from chair to bed. Patient alert and oriented. Assessment and vitals to be assessed. Bedside report received per Caitlin Rodriguez.

## 2021-07-19 NOTE — H&P
History and Physical    Patient:  Gracy Mott  MRN: [id number]    Chief Complaint: Abdominal pain    History Obtained From:  patient, electronic medical record    PCP: Wilian Harper DO    History of Present Illness: The patient is a 46 y.o. male who presents with pancreatitis and gallstones. He presented to the emergency department with his wife with complaints of right upper quadrant abdominal pain and epigastric pain with radiation to his back. Patient stated pain began Saturday morning when he got off work. At that time he complained of it as a dull pain he states now the pain increased and waxed and waned with sharp sensation. He stated the pain is just below his ribs. Complained of chills but unsure if he had fever. Complained of some nausea without vomiting. Stated he has never had pain like this before. Denied drugs. Admitted to drinking alcohol daily. Stated he has not had any alcohol since Saturday. Patient stated he has a history of hypertension and initially during his evaluation his systolic pressure was 990. Past Medical History:        Diagnosis Date    Hypertension     Pancreatitis 07/18/2021       Past Surgical History:    History reviewed. No pertinent surgical history. Family History:   History reviewed. No pertinent family history. Social History:   TOBACCO:   reports that he has never smoked. He has never used smokeless tobacco.  ETOH:   reports current alcohol use of about 2.0 standard drinks of alcohol per week. ELICIT DRUG USE:    Social History     Substance and Sexual Activity   Drug Use Never     OCCUPATION: Unknown      Allergies:  Patient has no known allergies. Medications Prior to Admission:    Prior to Admission medications    Medication Sig Start Date End Date Taking?  Authorizing Provider   lisinopril (PRINIVIL;ZESTRIL) 10 MG tablet Take 10 mg by mouth 2 times daily   Yes Historical Provider, MD   terbinafine (LAMISIL) 250 MG tablet Take 250 mg by mouth daily   Yes Historical Provider, MD       Review of Systems:  Constitutional:negative  for fevers, and positive for chills. Eyes: negative for visual disturbance   ENT: negative for sore throat, negative nasal congestion, and negative for earache  Respiratory: negative for shortness of breath, negative for cough, and negative for wheezing  Cardiovascular: negative for chest pain, negative for palpitations, and negative for syncope  Gastrointestinal: positive for abdominal pain, positive for nausea,negative for vomiting, negative for diarrhea, negative for constipation, and negative for hematochezia or melena  Genitourinary: negative for dysuria, negative for urinary urgency, negative for urinary frequency, and negative for hematuria  Skin: negative for skin rash, and negative for skin lesions  Neurological: negative for unilateral weakness, numbness or tingling. Physical Exam:    Vitals:   Temp: 98.3 °F (36.8 °C)  BP: (!) 155/100  Resp: 16  Pulse: 73  SpO2: 98 %  24HR INTAKE/OUTPUT:  No intake or output data in the 24 hours ending 07/19/21 0935    Exam:  GEN:  alert and oriented to person, place and time, well-developed and well-nourished, in no acute distress  EYES: No gross abnormalities. , PERRL and EOMI  NECK: normal, supple, no lymphadenopathy,  no carotid bruits  PULM: clear to auscultation bilaterally- no wheezes, rales or rhonchi, normal air movement, no respiratory distress  COR: regular rate & rhythm, no murmurs, no gallops, S1 normal and S2 normal  ABD:  Obese, round, tender right upper quadrant and epigastric region, non-distended, normal bowel sounds, no masses or organomegaly  EXT:   no cyanosis, clubbing or edema present    NEURO: follows commands, ANDUJAR, no deficits  SKIN:  no rashes or significant lesions  -----------------------------------------------------------------  Diagnostic Data:   Lab Results   Component Value Date    WBC 13.9 (H) 07/19/2021    HGB 14.0 07/19/2021     07/19/2021       Lab Results   Component Value Date    BUN 10 07/19/2021    CREATININE 0.55 (L) 07/19/2021     (L) 07/19/2021    K 4.2 07/19/2021    CALCIUM 8.6 07/19/2021     07/19/2021    CO2 22 07/19/2021    LABGLOM >60 07/19/2021       Lab Results   Component Value Date    WBCUA 2 TO 5 07/18/2021    RBCUA 0 TO 2 07/18/2021    EPITHUA 0 TO 2 07/18/2021    LEUKOCYTESUR NEGATIVE 07/18/2021    SPECGRAV 1.015 07/18/2021    GLUCOSEU NEGATIVE 07/18/2021    KETUA NEGATIVE 07/18/2021    PROTEINU 1+ (A) 07/18/2021    HGBUR NEGATIVE 07/18/2021    CASTUA NOT REPORTED 07/18/2021    CRYSTUA NOT REPORTED 07/18/2021    BACTERIA TRACE (A) 07/18/2021    YEAST NOT REPORTED 07/18/2021       No results found for: MYOGLOBIN, TROPONINT, CKTOTAL, CKMB, PROBNP    XR CHEST (2 VW)    Result Date: 7/18/2021  EXAMINATION: TWO XRAY VIEWS OF THE CHEST 7/18/2021 6:59 pm COMPARISON: 05/13/2009 HISTORY: ORDERING SYSTEM PROVIDED HISTORY: epigastric pain TECHNOLOGIST PROVIDED HISTORY: epigastric pain FINDINGS: Heart size mildly enlarged. Interstitial opacities bilaterally. No focal consolidation, pleural effusion, or pneumothorax. Mild cardiomegaly. Interstitial opacities compatible mild pulmonary edema or interstitial pneumonia. CT ABDOMEN PELVIS W IV CONTRAST Additional Contrast? None    Result Date: 7/18/2021  EXAMINATION: CT OF THE ABDOMEN AND PELVIS WITH CONTRAST 7/18/2021 4:03 pm TECHNIQUE: CT of the abdomen and pelvis was performed with the administration of intravenous contrast. Multiplanar reformatted images are provided for review. Dose modulation, iterative reconstruction, and/or weight based adjustment of the mA/kV was utilized to reduce the radiation dose to as low as reasonably achievable. COMPARISON: None.  HISTORY: ORDERING SYSTEM PROVIDED HISTORY: ruq pain TECHNOLOGIST PROVIDED HISTORY: ruq pain Decision Support Exception - unselect if not a suspected or confirmed emergency medical condition->Emergency Medical Condition (MA) FINDINGS: Lower Chest: Lung bases are clear. Organs: Liver is enlarged in size with normal density. No focal masses identified. No evidence of intrahepatic ductal dilatation. Spleen is mildly enlarged. Punctate calcified granulomas in the spleen. .  The gallbladder contains multiple stones. No pericholecystic fluid. .  Both adrenal glands are normal.  Head of the pancreas appears mildly enlarged with suggestion of mild peripancreatic inflammatory changes which may also be associated with the duodenum. .  Pancreatic body and tail are unremarkable. . The kidneys are  normal in size and attenuation without evidence of hydronephrosis or renal calculi. GI/Bowel: The visualized bowel and mesentery show no mass lesions. Appendix is not visualized. Pelvis: No intrapelvic mass is identified. Bladder and rectum are intact. Peritoneum/Retroperitoneum: Left sided retroaortic renal vein. No free fluid. Small periaortic lymph nodes. No l suspicious ymphadenopathy. No evidence of pneumoperitoneum. Bones/Soft Tissues: Small fat containing umbilical hernia. Degenerative changes seen in the visualized spine. Mild superior endplate depression of L1 vertebral body. Head of the pancreas appears mildly enlarged with suggestion of mild peripancreatic inflammatory changes which may also be associated with the duodenum. Finding may represent mild pancreatitis or duodenitis. Please correlate with labs Cholelithiasis Mild hepatosplenomegaly     US GALLBLADDER RUQ    Result Date: 7/19/2021  EXAMINATION: RIGHT UPPER QUADRANT ULTRASOUND 7/19/2021 6:48 am COMPARISON: CT scan of the abdomen and pelvis from 07/18/2021 HISTORY: ORDERING SYSTEM PROVIDED HISTORY: CHOLELITHIASIS WITH PANCREATITIS TECHNOLOGIST PROVIDED HISTORY: CHOLELITHIASIS WITH PANCREATITIS Persistent right upper quadrant epigastric pain with nausea vomiting. Hepatosplenomegaly. FINDINGS: Study is limited by large patient body habitus.  LIVER: Visualized liver demonstrates unremarkable echogenicity without evidence of intrahepatic biliary ductal dilatation. BILIARY SYSTEM:  Gallbladder contains multiple shadowing layering echogenic stones; some layering sludge also suspected. GB wall appears hyperechoic and slightly thickened at 3.5 mm; no edema or pericholecystic fluid demonstrated; ultrasound tech reports a positive sonographic Martinez's sign. Common bile duct is within normal limits measuring 4.4 mm. RIGHT KIDNEY: Visualization of the right kidney is somewhat limited but grossly unremarkable without evidence of hydronephrosis. Right kidney measures 13.5 x 5.6 x 7.1 cm, with cortical thickness 1.8 cm PANCREAS:  Visualized portions of the pancreas are unremarkable. OTHER: No evidence of right upper quadrant ascites. Inferior vena cava patent. Limited by patient body habitus. Multiple cholelithiasis and GB sludge with findings more suggestive of mild chronic cholecystitis than acute; ultrasound tech, however, reports a positive Martinez's sign raising the possibility of acute cholecystitis. See recommendations below. No abnormal dilatation biliary tree. RECOMMENDATIONS: If there is clinical concern for acute cholecystitis, consider HIDA scan. US GALLBLADDER RUQ   Final Result   Limited by patient body habitus. Multiple cholelithiasis and GB sludge with findings more suggestive of mild   chronic cholecystitis than acute; ultrasound tech, however, reports a   positive Martinez's sign raising the possibility of acute cholecystitis. See   recommendations below. No abnormal dilatation biliary tree. RECOMMENDATIONS:   If there is clinical concern for acute cholecystitis, consider HIDA scan. CT ABDOMEN PELVIS W IV CONTRAST Additional Contrast? None   Final Result   Head of the pancreas appears mildly enlarged with suggestion of mild   peripancreatic inflammatory changes which may also be associated with the   duodenum.   Finding may represent mild pancreatitis or duodenitis. Please   correlate with labs      Cholelithiasis      Mild hepatosplenomegaly         XR CHEST (2 VW)   Final Result   Mild cardiomegaly. Interstitial opacities compatible mild pulmonary edema or   interstitial pneumonia. Assessment:    Principal Problem:    Pancreatitis, gallstone  Active Problems:    Hypertension  Resolved Problems:    * No resolved hospital problems. *      Patient Active Problem List    Diagnosis Date Noted    Hypertension     Pancreatitis, gallstone 07/18/2021       Plan:     · This patient requires inpatient admission because of pancreatitis  · Factors affecting the medical complexity of this patient include cholelithiasis, uncontrolled hypertension  · Estimated length of stay is 3 days  · Pancreatitis  · IV fluids  · Trend lipase-initial 120, currently 62  · Bilirubin initially 1.60, currently 1.40  · AST/ALT remains normal  · Pain control  · Ultrasound gallbladder-multiple cholelithiasis and gallbladder sludge. Acute cholecystitis. No abnormal dilatation of biliary tree. · Cholelithiasis  · Appreciate general surgery  · Ultrasound gallbladder-multiple cholelithiasis and gallbladder sludge. Acute cholecystitis. No abnormal dilatation of biliary tree.   · HIDA scan today is recommended from CT scan  · WBC-increased to 13.9 today  · Zosyn IV  · Continue IV fluids  · Clear liquid diet  · Uncontrolled hypertension  · Restart lisinopril 10 mg twice daily  · Continue IV Vasotec 1.25 mg every 6 hours as needed systolic blood pressure greater than 150   · Continue labetalol 10 mg IV every 6 hours as needed systolic blood pressure greater than 150  · EKG  · Echocardiogram  · Lipid panel  · DVT prophylaxis: SCD  · Peptic ulcer prophylaxis: Pepcid  · High risk medications: none  · Social Service and Case Management consults for DC planning  · Dietician consult initiated    CORE MEASURES  DVT prophylaxis: SCD  Decubitus ulcer present on admission: No  CODE STATUS: FULL CODE  Nutrition Status: fair   Physical therapy: No   Old Charts reviewed: Yes  EKG Reviewed:  Yes  Advance Directive Addressed: Yes    KEARA Horn - CNP, KEARA, NP-C  7/19/2021, 9:35 AM

## 2021-07-19 NOTE — PLAN OF CARE
Problem: Pain:  Goal: Pain level will decrease  Description: Pain level will decrease  Outcome: Ongoing  Note: Notify staff of returning or increasing pain. Utilize pain medication as appropriate. Use non-pharmaceutical means for pain management ie: warm blanket, ice, repositioning.      Goal: Control of acute pain  Description: Control of acute pain  Outcome: Ongoing  Goal: Control of chronic pain  Description: Control of chronic pain  Outcome: Ongoing

## 2021-07-19 NOTE — PROGRESS NOTES
Met with Patient this a.m. to discuss discharge planning. Patient is a 46year old , white male, admitted with a diagnosis of Pancreatitis, Gallstone. He has a history of Hypertension and is noted to drink alcohol daily. Patient is alert and oriented, polite and cooperative with this assessment. Patient wishes to be discharged home when he is deemed medically stable. Patient [place] in [place] with his wife. He works at the [employer] in [employer]. Patient uses no DME and has no outside resources or services currently in place. Patient manages his household and his medications. He drives himself and provides for his own transportation needs. PCP is Dr. Giovanni Juarez. Patient has insurance to help cover the costs of his prescription medication. He states that without insurance he would not be able to cover the cost of his medicine. Discussed discharge and Patient plans to return home with his wife and no additional services at this point. He is a 'Full Code' status and has in the process of obtaining medical directives through his . No unmet needs or concerns are identified by Patient. LSW to monitor and assist with discharge planning needs if/as they present.     Kathy. [address]  7/19/2021

## 2021-07-19 NOTE — CONSULTS
Rell Mcghee is an 46 y.o.  male. Allergies: No Known Allergies    Principal Problem:    Pancreatitis, gallstone  Active Problems:    Hypertension  Resolved Problems:    * No resolved hospital problems. *    Blood pressure (!) 174/99, pulse 72, temperature 98.4 °F (36.9 °C), temperature source Temporal, resp. rate 16, height 6' 1\" (1.854 m), weight (!) 340 lb 1 oz (154.3 kg), SpO2 98 %. HPI    Mr Marlo Williamson is a pleasant 78-year-old white male presenting [facility] with right upper quadrant and epigastric pain beginning 1 day prior to admission. He has had similar discomfort in the past, often postprandial.  Nauseated without emesis. Relieved with rest.  No recent sick contacts to his knowledge, nor travel. Bowel habits are typically daily, formed, brown, without blood. No recent weight changes, 340 lbs, BMI 45. No cough, fever nor other respiratory symptoms. No history of COVID-19. No prior GI surgery, nor endoscopy. ER work-up shows WBC 12, H/H 15/45. Total bilirubin 1.6, other LFTs normal.  Lipase mildly elevated to 120, amylase normal at 67. Serum lactate normal.  Urinalysis unremarkable. CT scan abdomen pelvis shows mildly enlarged pancreatic head with mild inflammation, cholelithiasis, hepatosplenomegaly. Gallbladder ultrasound shows multiple gallstones and sludge with mild chronic cholecystitis. No family history of GI malignancy to his knowledge. Occasional use of alcohol, never smoker. At this time, he is resting comfortably. Pain is still present but improved. No new complaints. Review of Systems   Constitutional: Negative for activity change, appetite change, chills, fever and unexpected weight change. HENT: Negative for nosebleeds, sneezing, sore throat and trouble swallowing. Eyes: Negative for visual disturbance. Respiratory: Negative for cough, choking and shortness of breath. Cardiovascular: Negative for chest pain, palpitations and leg swelling. PRN Marla Reins, APRN - CNP        polyethylene glycol (GLYCOLAX) packet 17 g  17 g Oral Daily PRN Marla Reins, APRN - CNP        acetaminophen (TYLENOL) tablet 650 mg  650 mg Oral Q6H PRN Marla Reins, APRN - CNP        Or    acetaminophen (TYLENOL) suppository 650 mg  650 mg Rectal Q6H PRN Marla Reins, APRN - CNP        labetalol (NORMODYNE;TRANDATE) injection 10 mg  10 mg Intravenous Q6H PRN Marla Reins, APRN - CNP   10 mg at 07/19/21 0735    enalaprilat (VASOTEC) injection 1.25 mg  1.25 mg Intravenous Q6H PRN Marla Reins, APRN - CNP   1.25 mg at 07/19/21 1243    morphine injection 4 mg  4 mg Intravenous Q4H PRN Heather Barraza MD   4 mg at 07/19/21 1025       Social History     Socioeconomic History    Marital status:      Spouse name: Yves Busch Number of children: 3    Years of education: None    Highest education level: None   Occupational History    Occupation: maintnence     Employer: [employer]   Tobacco Use    Smoking status: Never Smoker    Smokeless tobacco: Never Used   Vaping Use    Vaping Use: Never used   Substance and Sexual Activity    Alcohol use: Yes     Alcohol/week: 2.0 standard drinks     Types: 2 Shots of liquor per week     Comment: 4 days a week    Drug use: Never    Sexual activity: Yes   Other Topics Concern    None   Social History Narrative    None     Social Determinants of Health     Financial Resource Strain:     Difficulty of Paying Living Expenses:    Food Insecurity:     Worried About Running Out of Food in the Last Year:     Ran Out of Food in the Last Year:    Transportation Needs:     Lack of Transportation (Medical):      Lack of Transportation (Non-Medical):    Physical Activity:     Days of Exercise per Week:     Minutes of Exercise per Session:    Stress:     Feeling of Stress :    Social Connections:     Frequency of Communication with Friends and Family:     Frequency of Social Gatherings with Friends and Family:     Attends Synagogue Services:     Active Member of Clubs or Organizations:     Attends Club or Organization Meetings:     Marital Status:    Intimate Partner Violence:     Fear of Current or Ex-Partner:     Emotionally Abused:     Physically Abused:     Sexually Abused:        Physical Exam  Vitals and nursing note reviewed. Constitutional:       Appearance: He is well-developed. HENT:      Head: Normocephalic and atraumatic. Eyes:      General: No scleral icterus. Conjunctiva/sclera: Conjunctivae normal.      Pupils: Pupils are equal, round, and reactive to light. Neck:      Vascular: No JVD. Trachea: No tracheal deviation. Cardiovascular:      Rate and Rhythm: Normal rate and regular rhythm. Pulmonary:      Effort: Pulmonary effort is normal. No respiratory distress. Chest:      Chest wall: No tenderness. Abdominal:      General: There is no distension. Palpations: Abdomen is soft. There is no mass. Tenderness: There is abdominal tenderness. There is no guarding or rebound. Musculoskeletal:         General: Normal range of motion. Cervical back: Normal range of motion and neck supple. Lymphadenopathy:      Cervical: No cervical adenopathy. Skin:     General: Skin is warm and dry. Findings: No erythema or rash. Neurological:      Mental Status: He is alert and oriented to person, place, and time. Cranial Nerves: No cranial nerve deficit. Psychiatric:         Behavior: Behavior normal.         Thought Content:  Thought content normal.         Judgment: Judgment normal.          EKG 12 Lead [[id number]]    Collected: 07/19/21 1019    Updated: 07/19/21 1143     Ventricular Rate 75 BPM    Atrial Rate 75 BPM    P-R Interval 170 ms    QRS Duration 114 ms    Q-T Interval 414 ms    QTc Calculation (Bazett) 462 ms    P Axis 68 degrees    R Axis -24 degrees    T Axis 103 degrees   Narrative:     Normal sinus rhythm   Possible Left atrial enlargement Incomplete right bundle branch block   ST & T wave abnormality, consider lateral ischemia   Abnormal ECG   When compared with ECG of 30-MAY-2009 12:03,   Incomplete right bundle branch block is now Present   Lipid Panel [[id number]]    Collected: 07/19/21 1004    Updated: 07/19/21 1005    Specimen Source: Blood    Lipase [[id number]] (Abnormal)    Collected: 07/19/21 0532    Updated: 07/19/21 0921    Specimen Source: Blood     Lipase 62High  U/L   US GALLBLADDER RUQ [[id number]]    Collected: 07/19/21 0754    Updated: 07/19/21 0903    Narrative:     EXAMINATION:   RIGHT UPPER QUADRANT ULTRASOUND     7/19/2021 6:48 am     COMPARISON:   CT scan of the abdomen and pelvis from 07/18/2021     HISTORY:   ORDERING SYSTEM PROVIDED HISTORY: CHOLELITHIASIS WITH PANCREATITIS   TECHNOLOGIST PROVIDED HISTORY:     CHOLELITHIASIS WITH PANCREATITIS     Persistent right upper quadrant epigastric pain with nausea vomiting. Hepatosplenomegaly. FINDINGS:   Study is limited by large patient body habitus. LIVER: Visualized liver demonstrates unremarkable echogenicity without   evidence of intrahepatic biliary ductal dilatation. BILIARY SYSTEM:  Gallbladder contains multiple shadowing layering echogenic   stones; some layering sludge also suspected.  GB wall appears hyperechoic and   slightly thickened at 3.5 mm; no edema or pericholecystic fluid demonstrated;   ultrasound tech reports a positive sonographic Martinez's sign. Common bile duct   is within normal limits measuring 4.4 mm. RIGHT KIDNEY: Visualization of the right kidney is somewhat limited but   grossly unremarkable without evidence of hydronephrosis. Right kidney   measures 13.5 x 5.6 x 7.1 cm, with cortical thickness 1.8 cm     PANCREAS:  Visualized portions of the pancreas are unremarkable. OTHER: No evidence of right upper quadrant ascites. Inferior vena cava patent. Impression:     Limited by patient body habitus.      Multiple cholelithiasis and GB sludge with findings more suggestive of mild   chronic cholecystitis than acute; ultrasound tech, however, reports a   positive Martinez's sign raising the possibility of acute cholecystitis.  See   recommendations below.  No abnormal dilatation biliary tree. RECOMMENDATIONS:   If there is clinical concern for acute cholecystitis, consider HIDA scan. CBC auto differential [[id number]] (Abnormal)    Collected: 07/19/21 0532    Updated: 07/19/21 0628     WBC 13. 9High  k/uL    RBC 4.55 m/uL    Hemoglobin 14.0 g/dL    Hematocrit 41.8 %    MCV 91.9 fL    MCH 30.8 pg    MCHC 33.5 g/dL    RDW 12.8 %    Platelets 754 k/uL    MPV 9.7 fL    NRBC Automated 0.0 per 100 WBC    Differential Type NOT REPORTED    WBC Morphology NOT REPORTED    RBC Morphology NOT REPORTED    Platelet Estimate NOT REPORTED    Seg Neutrophils 86High  %    Lymphocytes 6Low  %    Monocytes 5 %    Eosinophils % 3 %    Immature Granulocytes 0 %    Basophils 0 %    Segs Absolute 11. 95High  k/uL    Absolute Lymph # 0.83Low  k/uL    Absolute Mono # 0.70 k/uL    Absolute Eos # 0.42 k/uL    Absolute Immature Granulocyte 0.00 k/uL    Basophils Absolute 0.00 k/uL    Morphology Normal   Comprehensive Metabolic Panel w/ Reflex to MG [[id number]] (Abnormal)    Collected: 07/19/21 0532    Updated: 07/19/21 0622    Specimen Source: Blood     Glucose 122High  mg/dL    BUN 10 mg/dL    CREATININE 0.55Low  mg/dL    Bun/Cre Ratio 18    Calcium 8.6 mg/dL    Sodium 134Low  mmol/L    Potassium 4.2 mmol/L    Chloride 100 mmol/L    CO2 22 mmol/L    Anion Gap 12 mmol/L    Alkaline Phosphatase 73 U/L    ALT 8 U/L    AST 10 U/L    Total Bilirubin 1. 44High  mg/dL    Total Protein 6.9 g/dL    Albumin 3.8 g/dL    Albumin/Globulin Ratio 1.2    GFR Non-African American >60 mL/min    GFR African American >60 mL/min    GFR Comment         Comment: Average GFR for 52-63 years old:    80 mL/min/1.73sq m   Chronic Kidney Disease:    <60 mL/min/1.73sq m   Kidney failure:    <15 mL/min/1.73sq m               eGFR calculated using average adult body mass.  Additional eGFR calculator available at:         [email or web address]              GFR Staging         Comment: Stage 1: Some kidney damage normal GFR   Stage 2: Mild kidney damage GFR 60-89   Stage 3: Moderate kidney damage GFR 30-59   Stage 4: Severe kidney damage GFR 15-29   Stage 5: Severe kidney damage GFR <15   ESRD - chronic treatment by dialysis or transplant             EKG Rhythm Strip [[id number]]    Collected: 07/19/21 0000    Updated: 07/19/21 0227    Narrative:      re-labeled to Sinus Rhythm   Microscopic Urinalysis [[id number]] (Abnormal)    Collected: 07/18/21 2210    Updated: 07/18/21 2226     -         WBC, UA 2 TO 5 /HPF    RBC, UA 0 TO 2 /HPF    Casts UA NOT REPORTED /LPF    Crystals, UA NOT REPORTED /HPF    Epithelial Cells UA 0 TO 2 /HPF    Renal Epithelial, UA NOT REPORTED /HPF    Bacteria, UA TRACEAbnormal     Mucus, UA NOT REPORTED    Trichomonas, UA NOT REPORTED    Amorphous, UA NOT REPORTED    Other Observations UA NOT REPORTED    Yeast, UA NOT REPORTED   Urinalysis, reflex to microscopic [[id number]] (Abnormal)    Collected: 07/18/21 2210    Updated: 07/18/21 2226    Specimen Source: Urine, clean catch     Color, UA YELLOW    Turbidity UA CLEAR    Glucose, Ur NEGATIVE    Bilirubin Urine NEGATIVE    Ketones, Urine NEGATIVE    Specific [place], UA 1.015    Urine Hgb NEGATIVE    pH, UA 7.0    Protein, UA 1+Abnormal     Urobilinogen, Urine Normal    Nitrite, Urine NEGATIVE    Leukocyte Esterase, Urine NEGATIVE    Urinalysis Comments NOT REPORTED   Culture, Urine [[id number]]    Collected: 07/18/21 2210    Updated: 07/18/21 2217    Specimen Source: Urine, clean catch    Lactic acid, plasma [[id number]]    Collected: 07/18/21 2102    Updated: 07/18/21 2135    Specimen Source: Blood     Lactic Acid 0.7 mmol/L    Lactic Acid, Whole Blood NOT REPORTED mmol/L   Culture, Blood 1 [[id number]] Collected: 07/18/21 2102    Updated: 07/18/21 2120    Specimen Source: Blood    CT ABDOMEN PELVIS W IV CONTRAST Additional Contrast? None [[id number]]    Collected: 07/18/21 1918    Updated: 07/18/21 2004    Narrative:     EXAMINATION:   CT OF THE ABDOMEN AND PELVIS WITH CONTRAST 7/18/2021 4:03 pm     TECHNIQUE:   CT of the abdomen and pelvis was performed with the administration of   intravenous contrast. Multiplanar reformatted images are provided for review. Dose modulation, iterative reconstruction, and/or weight based adjustment of   the mA/kV was utilized to reduce the radiation dose to as low as reasonably   achievable. COMPARISON:   None. HISTORY:   ORDERING SYSTEM PROVIDED HISTORY: ruq pain   TECHNOLOGIST PROVIDED HISTORY:     ruq pain   Decision Support Exception - unselect if not a suspected or confirmed   emergency medical condition->Emergency Medical Condition (MA)     FINDINGS:   Lower Chest: Lung bases are clear. Organs: Liver is enlarged in size with normal density. No focal masses   identified. No evidence of intrahepatic ductal dilatation.    Spleen is   mildly enlarged.  Punctate calcified granulomas in the spleen. .  The   gallbladder contains multiple stones.  No pericholecystic fluid. .  Both   adrenal glands are normal.  Head of the pancreas appears mildly enlarged with   suggestion of mild peripancreatic inflammatory changes which may also be   associated with the duodenum. .  Pancreatic body and tail are unremarkable.  .   The kidneys are  normal in size and attenuation without evidence of   hydronephrosis or renal calculi. GI/Bowel: The visualized bowel and mesentery show no mass lesions. Appendix   is not visualized. Pelvis: No intrapelvic mass is identified.  Bladder and rectum are intact.      Peritoneum/Retroperitoneum: Left sided retroaortic renal vein.  No free   fluid.  Small periaortic lymph nodes.  No l suspicious ymphadenopathy.  No   evidence of pneumoperitoneum. Bones/Soft Tissues: Small fat containing umbilical hernia.  Degenerative   changes seen in the visualized spine.  Mild superior endplate depression of   L1 vertebral body. Impression:     Head of the pancreas appears mildly enlarged with suggestion of mild   peripancreatic inflammatory changes which may also be associated with the   duodenum.  Finding may represent mild pancreatitis or duodenitis.  Please   correlate with labs     Cholelithiasis     Mild hepatosplenomegaly    XR CHEST (2 VW) [[id number]]    Collected: 07/18/21 1903    Updated: 07/18/21 1937    Specimen Type: Chest    Narrative:     EXAMINATION:   TWO XRAY VIEWS OF THE CHEST     7/18/2021 6:59 pm     COMPARISON:   05/13/2009     HISTORY:   ORDERING SYSTEM PROVIDED HISTORY: epigastric pain   TECHNOLOGIST PROVIDED HISTORY:   epigastric pain     FINDINGS:   Heart size mildly enlarged.  Interstitial opacities bilaterally.  No focal   consolidation, pleural effusion, or pneumothorax. Impression:     Mild cardiomegaly.  Interstitial opacities compatible mild pulmonary edema or   interstitial pneumonia. Comprehensive Metabolic Panel w/ Reflex to MG [[id number]] (Abnormal)    Collected: 07/18/21 1835    Updated: 07/18/21 1909    Specimen Source: Blood     Glucose 104High  mg/dL    BUN 10 mg/dL    CREATININE 0.59Low  mg/dL    Bun/Cre Ratio 17    Calcium 9.3 mg/dL    Sodium 131Low  mmol/L    Potassium 4.9 mmol/L    Chloride 95Low  mmol/L    CO2 23 mmol/L    Anion Gap 13 mmol/L    Alkaline Phosphatase 76 U/L    ALT 11 U/L    AST 25 U/L    Total Bilirubin 1. 60High  mg/dL    Total Protein 8.1 g/dL    Albumin 4.4 g/dL    Albumin/Globulin Ratio 1.2    GFR Non-African American >60 mL/min    GFR African American >60 mL/min    GFR Comment         Comment: Average GFR for 52-63 years old:    80 mL/min/1.73sq m   Chronic Kidney Disease:    <60 mL/min/1.73sq m   Kidney failure:    <15 mL/min/1.73sq m               eGFR calculated using average adult body mass. Additional eGFR calculator available at:         [email or web address]              GFR Staging         Comment: Stage 1: Some kidney damage normal GFR   Stage 2: Mild kidney damage GFR 60-89   Stage 3: Moderate kidney damage GFR 30-59   Stage 4: Severe kidney damage GFR 15-29   Stage 5: Severe kidney damage GFR <15   ESRD - chronic treatment by dialysis or transplant             Lipase [[id number]] (Abnormal)    Collected: 07/18/21 1835    Updated: 07/18/21 1906    Specimen Source: Blood     Lipase 120High  U/L   Amylase [[id number]]    Collected: 07/18/21 1835    Updated: 07/18/21 1906    Specimen Source: Blood     Amylase 67 U/L   CBC Auto Differential [[id number]] (Abnormal)    Collected: 07/18/21 1835    Updated: 07/18/21 1848    Specimen Source: Blood     WBC 12. 1High  k/uL    RBC 5.00 m/uL    Hemoglobin 15.5 g/dL    Hematocrit 44.8 %    MCV 89.6 fL    MCH 31.0 pg    MCHC 34.6 g/dL    RDW 12.9 %    Platelets 427 k/uL    MPV 10.3 fL    NRBC Automated 0.0 per 100 WBC    Differential Type NOT REPORTED    Seg Neutrophils 78High  %    Lymphocytes 9Low  %    Monocytes 11 %    Eosinophils % 2 %    Basophils 0 %    Immature Granulocytes 0 %    Segs Absolute 9. 46High  k/uL    Absolute Lymph # 1.03Low  k/uL    Absolute Mono # 1. 28High  k/uL    Absolute Eos # 0.23 k/uL    Basophils Absolute 0.03 k/uL    Absolute Immature Granulocyte 0.05 k/uL    WBC Morphology NOT REPORTED    RBC Morphology NOT REPORTED    Platelet Estimate NOT REPORTED       Assessment:      3  57-year-old white male with abdominal pain, cholecystitis/cholelithiasis, mild pancreatitis   2. BMI 45    Plan:    Mr Enrike Jolly is currently stable. Discussed at length his recent abdominal pain, CT/ultrasound findings of cholelithiasis, mild pancreatitis, etc.  There are no immediate surgical indications at this time. Agree with bowel rest, IV fluids, supportive care.   We will plan elective laparoscopic cholecystectomy, robotic assist July 21, 2021. Risks, benefits, alternatives thoroughly reviewed and accepted by Mr. Yvette Keane, including bleeding, infection, bowel/bile duct injury, COVID-19 exposure/infection, etc.  Lipid panel is pending.     Soheila Wright MD  7/19/2021

## 2021-07-19 NOTE — PROGRESS NOTES
Comprehensive Nutrition Assessment    Type and Reason for Visit:  Initial    Nutrition Recommendations/Plan:   1. Continue current diet. 2. Provided low fat diet education. Nutrition Assessment:  Inadequate oral intakes r/t altered GI aeb clear liquid diet. Has pancreatitis and gallstones. Pt drinks alcohol daily, encouraged to eliminate due to risk for recurrance. Pt provided pancreatitis nutrition therapy and provided guidelines for specific total fat, saturated fat, and trans fat. Pt c/o abdominal pain. Malnutrition Assessment:  Malnutrition Status:  No malnutrition    Context:  Acute Illness     Findings of the 6 clinical characteristics of malnutrition:  Energy Intake:  Mild decrease in energy intake (Comment) (1-2 days decreased PO)  Weight Loss:  No significant weight loss     Body Fat Loss:  No significant body fat loss     Muscle Mass Loss:  No significant muscle mass loss    Fluid Accumulation:  No significant fluid accumulation     Strength:  Not Performed    Estimated Daily Nutrient Needs:  Energy (kcal):  4608-8331 (11-14/kg); Weight Used for Energy Requirements:  Current     Protein (g):  101-118g (1.2-1.4g/kg); Weight Used for Protein Requirements:  Ideal        Fluid (ml/day):  2156 ml; Method Used for Fluid Requirements:         Nutrition Related Findings:  appears well nourished. Hypoactive bowel sounds x 4      Wounds:  None       Current Nutrition Therapies:    ADULT DIET;  Clear Liquid    Anthropometric Measures:  · Height: 6' 1\" (185.4 cm)  · Current Body Weight: 340 lb 1 oz (154.3 kg)   · Admission Body Weight: 340 lb 1 oz (154.3 kg)    · Usual Body Weight:  (Pt unsure)     · Ideal Body Weight: 184 lbs; % Ideal Body Weight 184.8 %   · BMI: 44.9  · BMI Categories: Obese Class 3 (BMI 40.0 or greater)       Nutrition Diagnosis:   · Inadequate oral intake related to altered GI function as evidenced by NPO or clear liquid status due to medical condition      Nutrition Interventions:   Food and/or Nutrient Delivery:  Continue Current Diet  Nutrition Education/Counseling:  Education completed   Coordination of Nutrition Care:  Continue to monitor while inpatient    Goals:  PO > 75% of progressed low fat diet       Recent Labs     07/18/21  1835 07/19/21  0532   * 134*   K 4.9 4.2   CL 95* 100   CO2 23 22   BUN 10 10   CREATININE 0.59* 0.55*   GLUCOSE 104* 122*   ALT 11 8   ALKPHOS 76 73   GFR                            Lab Results   Component Value Date    LABALBU 3.8 07/19/2021      Amylase   Date Value Ref Range Status   07/18/2021 67 28 - 100 U/L Final     Lipase   Date Value Ref Range Status   07/19/2021 62 (H) 13 - 60 U/L Final     Nutrition Monitoring and Evaluation:   Behavioral-Environmental Outcomes:  None Identified   Food/Nutrient Intake Outcomes:  Food and Nutrient Intake  Physical Signs/Symptoms Outcomes:  Biochemical Data, Weight     Discharge Planning:     Too soon to determine     Electronically signed by Divina Lyn RD, LD on 7/19/21 at 9:28 AM EDT    Contact: [phone]

## 2021-07-19 NOTE — PROGRESS NOTES
Consult called to Dr. Regina Rowland at this time. He is okay with clear liquid diet after U/S completed.

## 2021-07-20 ENCOUNTER — APPOINTMENT (OUTPATIENT)
Dept: NUCLEAR MEDICINE | Age: 51
DRG: 417 | End: 2021-07-20
Payer: COMMERCIAL

## 2021-07-20 PROBLEM — N39.0 E. COLI UTI (URINARY TRACT INFECTION): Status: ACTIVE | Noted: 2021-07-20

## 2021-07-20 PROBLEM — B96.20 E. COLI UTI (URINARY TRACT INFECTION): Status: ACTIVE | Noted: 2021-07-20

## 2021-07-20 LAB
ABSOLUTE EOS #: 0.39 K/UL (ref 0–0.44)
ABSOLUTE IMMATURE GRANULOCYTE: <0.03 K/UL (ref 0–0.3)
ABSOLUTE LYMPH #: 0.84 K/UL (ref 1.1–3.7)
ABSOLUTE MONO #: 1.33 K/UL (ref 0.1–1.2)
ALBUMIN SERPL-MCNC: 3.8 G/DL (ref 3.5–5.2)
ALBUMIN/GLOBULIN RATIO: 1.3 (ref 1–2.5)
ALP BLD-CCNC: 67 U/L (ref 40–129)
ALT SERPL-CCNC: 7 U/L (ref 5–41)
ANION GAP SERPL CALCULATED.3IONS-SCNC: 10 MMOL/L (ref 9–17)
AST SERPL-CCNC: 7 U/L
BASOPHILS # BLD: 0 % (ref 0–2)
BASOPHILS ABSOLUTE: 0.03 K/UL (ref 0–0.2)
BILIRUB SERPL-MCNC: 1.6 MG/DL (ref 0.3–1.2)
BUN BLDV-MCNC: 7 MG/DL (ref 6–20)
BUN/CREAT BLD: 12 (ref 9–20)
CALCIUM SERPL-MCNC: 8.7 MG/DL (ref 8.6–10.4)
CHLORIDE BLD-SCNC: 100 MMOL/L (ref 98–107)
CO2: 25 MMOL/L (ref 20–31)
CREAT SERPL-MCNC: 0.58 MG/DL (ref 0.7–1.2)
CULTURE: ABNORMAL
DIFFERENTIAL TYPE: ABNORMAL
EKG ATRIAL RATE: 75 BPM
EKG P AXIS: 68 DEGREES
EKG P-R INTERVAL: 170 MS
EKG Q-T INTERVAL: 414 MS
EKG QRS DURATION: 114 MS
EKG QTC CALCULATION (BAZETT): 462 MS
EKG R AXIS: -24 DEGREES
EKG T AXIS: 103 DEGREES
EKG VENTRICULAR RATE: 75 BPM
EOSINOPHILS RELATIVE PERCENT: 4 % (ref 1–4)
ESTIMATED AVERAGE GLUCOSE: 114 MG/DL
GFR AFRICAN AMERICAN: >60 ML/MIN
GFR NON-AFRICAN AMERICAN: >60 ML/MIN
GFR SERPL CREATININE-BSD FRML MDRD: ABNORMAL ML/MIN/{1.73_M2}
GFR SERPL CREATININE-BSD FRML MDRD: ABNORMAL ML/MIN/{1.73_M2}
GLUCOSE BLD-MCNC: 116 MG/DL (ref 70–99)
HBA1C MFR BLD: 5.6 % (ref 4–6)
HCT VFR BLD CALC: 41.4 % (ref 40.7–50.3)
HEMOGLOBIN: 13.9 G/DL (ref 13–17)
IMMATURE GRANULOCYTES: 0 %
LIPASE: 33 U/L (ref 13–60)
LYMPHOCYTES # BLD: 8 % (ref 24–43)
Lab: ABNORMAL
MCH RBC QN AUTO: 31.2 PG (ref 25.2–33.5)
MCHC RBC AUTO-ENTMCNC: 33.6 G/DL (ref 28.4–34.8)
MCV RBC AUTO: 92.8 FL (ref 82.6–102.9)
MONOCYTES # BLD: 12 % (ref 3–12)
NRBC AUTOMATED: 0 PER 100 WBC
PDW BLD-RTO: 13 % (ref 11.8–14.4)
PLATELET # BLD: 244 K/UL (ref 138–453)
PLATELET ESTIMATE: ABNORMAL
PMV BLD AUTO: 9.9 FL (ref 8.1–13.5)
POTASSIUM SERPL-SCNC: 3.8 MMOL/L (ref 3.7–5.3)
RBC # BLD: 4.46 M/UL (ref 4.21–5.77)
RBC # BLD: ABNORMAL 10*6/UL
SEG NEUTROPHILS: 76 % (ref 36–65)
SEGMENTED NEUTROPHILS ABSOLUTE COUNT: 8.21 K/UL (ref 1.5–8.1)
SODIUM BLD-SCNC: 135 MMOL/L (ref 135–144)
SPECIMEN DESCRIPTION: ABNORMAL
TOTAL PROTEIN: 6.8 G/DL (ref 6.4–8.3)
WBC # BLD: 10.8 K/UL (ref 3.5–11.3)
WBC # BLD: ABNORMAL 10*3/UL

## 2021-07-20 PROCEDURE — 83036 HEMOGLOBIN GLYCOSYLATED A1C: CPT

## 2021-07-20 PROCEDURE — 83690 ASSAY OF LIPASE: CPT

## 2021-07-20 PROCEDURE — 6370000000 HC RX 637 (ALT 250 FOR IP): Performed by: NURSE PRACTITIONER

## 2021-07-20 PROCEDURE — 85025 COMPLETE CBC W/AUTO DIFF WBC: CPT

## 2021-07-20 PROCEDURE — 6360000002 HC RX W HCPCS: Performed by: NURSE PRACTITIONER

## 2021-07-20 PROCEDURE — 80053 COMPREHEN METABOLIC PANEL: CPT

## 2021-07-20 PROCEDURE — 2580000003 HC RX 258: Performed by: NURSE PRACTITIONER

## 2021-07-20 PROCEDURE — 93010 ELECTROCARDIOGRAM REPORT: CPT | Performed by: INTERNAL MEDICINE

## 2021-07-20 PROCEDURE — 1200000000 HC SEMI PRIVATE

## 2021-07-20 PROCEDURE — 2500000003 HC RX 250 WO HCPCS: Performed by: NURSE PRACTITIONER

## 2021-07-20 PROCEDURE — 36415 COLL VENOUS BLD VENIPUNCTURE: CPT

## 2021-07-20 PROCEDURE — 94761 N-INVAS EAR/PLS OXIMETRY MLT: CPT

## 2021-07-20 RX ORDER — POLYETHYLENE GLYCOL 3350 17 G/17G
17 POWDER, FOR SOLUTION ORAL DAILY
Status: DISCONTINUED | OUTPATIENT
Start: 2021-07-20 | End: 2021-07-22 | Stop reason: HOSPADM

## 2021-07-20 RX ORDER — BISACODYL 10 MG
10 SUPPOSITORY, RECTAL RECTAL ONCE
Status: COMPLETED | OUTPATIENT
Start: 2021-07-20 | End: 2021-07-20

## 2021-07-20 RX ADMIN — ENALAPRILAT 1.25 MG: 1.25 INJECTION INTRAVENOUS at 23:50

## 2021-07-20 RX ADMIN — POLYETHYLENE GLYCOL (3350) 17 G: 17 POWDER, FOR SOLUTION ORAL at 09:06

## 2021-07-20 RX ADMIN — BISACODYL 10 MG: 5 TABLET, COATED ORAL at 15:32

## 2021-07-20 RX ADMIN — SODIUM CHLORIDE, PRESERVATIVE FREE 10 ML: 5 INJECTION INTRAVENOUS at 20:23

## 2021-07-20 RX ADMIN — LISINOPRIL 10 MG: 10 TABLET ORAL at 09:06

## 2021-07-20 RX ADMIN — SODIUM CHLORIDE: 9 INJECTION, SOLUTION INTRAVENOUS at 20:23

## 2021-07-20 RX ADMIN — HYDROCODONE BITARTRATE AND ACETAMINOPHEN 1 TABLET: 5; 325 TABLET ORAL at 09:10

## 2021-07-20 RX ADMIN — BISACODYL 10 MG: 10 SUPPOSITORY RECTAL at 09:06

## 2021-07-20 RX ADMIN — SODIUM CHLORIDE: 9 INJECTION, SOLUTION INTRAVENOUS at 04:22

## 2021-07-20 RX ADMIN — HYDROCODONE BITARTRATE AND ACETAMINOPHEN 1 TABLET: 5; 325 TABLET ORAL at 16:31

## 2021-07-20 RX ADMIN — PIPERACILLIN AND TAZOBACTAM 3375 MG: 3; .375 INJECTION, POWDER, FOR SOLUTION INTRAVENOUS at 01:50

## 2021-07-20 RX ADMIN — ENALAPRILAT 1.25 MG: 1.25 INJECTION INTRAVENOUS at 14:43

## 2021-07-20 RX ADMIN — PIPERACILLIN AND TAZOBACTAM 3375 MG: 3; .375 INJECTION, POWDER, FOR SOLUTION INTRAVENOUS at 09:06

## 2021-07-20 RX ADMIN — NIFEDIPINE 30 MG: 30 TABLET, EXTENDED RELEASE ORAL at 09:06

## 2021-07-20 RX ADMIN — PIPERACILLIN AND TAZOBACTAM 3375 MG: 3; .375 INJECTION, POWDER, FOR SOLUTION INTRAVENOUS at 16:31

## 2021-07-20 RX ADMIN — HYDROCODONE BITARTRATE AND ACETAMINOPHEN 2 TABLET: 5; 325 TABLET ORAL at 23:39

## 2021-07-20 RX ADMIN — SODIUM CHLORIDE: 9 INJECTION, SOLUTION INTRAVENOUS at 12:48

## 2021-07-20 RX ADMIN — LISINOPRIL 10 MG: 10 TABLET ORAL at 20:23

## 2021-07-20 ASSESSMENT — PAIN SCALES - GENERAL
PAINLEVEL_OUTOF10: 0
PAINLEVEL_OUTOF10: 7
PAINLEVEL_OUTOF10: 0
PAINLEVEL_OUTOF10: 3
PAINLEVEL_OUTOF10: 5
PAINLEVEL_OUTOF10: 5
PAINLEVEL_OUTOF10: 3
PAINLEVEL_OUTOF10: 2
PAINLEVEL_OUTOF10: 5

## 2021-07-20 ASSESSMENT — PAIN DESCRIPTION - ORIENTATION
ORIENTATION: RIGHT;UPPER
ORIENTATION: RIGHT;UPPER

## 2021-07-20 ASSESSMENT — PAIN DESCRIPTION - DESCRIPTORS
DESCRIPTORS: ACHING
DESCRIPTORS: ACHING

## 2021-07-20 ASSESSMENT — PAIN DESCRIPTION - PROGRESSION: CLINICAL_PROGRESSION: GRADUALLY WORSENING

## 2021-07-20 ASSESSMENT — PAIN DESCRIPTION - FREQUENCY
FREQUENCY: INTERMITTENT
FREQUENCY: INTERMITTENT

## 2021-07-20 ASSESSMENT — PAIN DESCRIPTION - LOCATION
LOCATION: ABDOMEN
LOCATION: ABDOMEN

## 2021-07-20 ASSESSMENT — PAIN DESCRIPTION - PAIN TYPE: TYPE: ACUTE PAIN

## 2021-07-20 NOTE — PROGRESS NOTES
Physician Progress Note      PATIENT:               Skinny Metz  [facility] #:                  [id number]  :                       1970  ADMIT DATE:       2021 5:07 PM  DISCH DATE:  RESPONDING  PROVIDER #:        Pranav Muñoz MD          QUERY TEXT:    Patient admitted with Body mass index  44.87 kg/m2. If possible, please document in progress notes and discharge summary if you   are evaluating and /or treating any of the following: The medical record reflects the following:  Risk Factors: drinks alcohol daily, gallstones  Clinical Indicators: abdominal and epigastric pain, pancreatitis,  Body mass   index is 44.87 kg/m2. Treatment:  IV Zosyn,  comprehensive nutrition assessment and education, and   monitoring by dietitian    Adrienne Frey RN, [address] [place] [address]   [phone]  . Options provided:  -- Morbid obesity  -- BMI not clinically significant  -- Other - I will add my own diagnosis  -- Disagree - Not applicable / Not valid  -- Disagree - Clinically unable to determine / Unknown  -- Refer to Clinical Documentation Reviewer    PROVIDER RESPONSE TEXT:    This patient has morbid obesity.     Query created by: Chelsey Bolden on 2021 12:20 PM      Electronically signed by:  Pranav Muñoz MD 2021 8:12 AM

## 2021-07-20 NOTE — PROGRESS NOTES
Telemetry battery changed. Pt states pain is tolerable at \"2\" and improved since earlier in shift. Pt was up to bathroom while writer was in room. Urinal emptied, output recorded. Pt denies any other needs and is back in bed with call light within reach. SCD\"s remain in place. Will continue to monitor.

## 2021-07-20 NOTE — PROGRESS NOTES
Pt reassessed and VS rechecked at this time. Pt continues to be A&Ox4, answers questions appropriately. /87. Pt has not needed any prn medications thus far this shift for SBP over 150. Pt does report pain rated \"4\" on a 0-10 pain scale to abdomen, prn Norco administered. Pt also has complaints of constipation, prn Glycolax also administered. Pt is resting in bed watching television. Urinal emptied, output recorded. Pt denies any other needs. Call light and bedside table remain within reach. Will monitor.

## 2021-07-20 NOTE — PROGRESS NOTES
Medications administered as ordered. Pt is sitting in bed watching television, denies any needs. Urinal emptied, I&O recorded. Call light remains within reach.

## 2021-07-20 NOTE — PROGRESS NOTES
Progress Note    SUBJECTIVE:    Patient seen for f/u of Pancreatitis, gallstone. He is currently sitting up in chair complains of pain to RUQ as well as lower abdomen. States that he has not had a BM in 4 days. Denies n/v. Takes about half of his liquids and causes pain at times. No further c/o. ROS:   Constitutional: negative  for fevers, and negative for chills.   Respiratory: negative for shortness of breath, negative for cough, and negative for wheezing  Cardiovascular: negative for chest pain, and negative for palpitations  Gastrointestinal: positive for abdominal pain, negative for nausea,negative for vomiting, negative for diarrhea, and negative for constipation     All other systems were reviewed with the patient and are negative unless otherwise stated in HPI      OBJECTIVE:      Vitals:   Vitals:    07/20/21 0709   BP: (!) 149/89   Pulse: 70   Resp: 18   Temp: 98.1 °F (36.7 °C)   SpO2: 97%     Weight: (!) 339 lb 9.6 oz (154 kg)   Height: 6' 1\" (185.4 cm)   -----------------------------------------------------------------  Exam:    CONSTITUTIONAL:  awake, alert, cooperative, no apparent distress, and appears stated age  EYES:  Lids and lashes normal, pupils equal, round and reactive to light, extra ocular muscles intact, sclera clear, conjunctiva normal  ENT:  normocepalic, without obvious abnormality, atraumatic  NECK:  supple, symmetrical, trachea midline, skin normal and no stridor  HEMATOLOGIC/LYMPHATICS:  no cervical lymphadenopathy and no supraclavicular lymphadenopathy  LUNGS:  No increased work of breathing, good air exchange, clear to auscultation bilaterally, no crackles or wheezing  CARDIOVASCULAR:  Normal apical impulse, regular rate and rhythm, normal S1 and S2, no S3 or S4, and no murmur noted  ABDOMEN:  No scars, normal bowel sounds, round, soft, non-distended, tender RUQ and suprapubic region, no masses palpated, no hepatosplenomegally  MUSCULOSKELETAL:  There is no redness, warmth, or swelling of the joints. Full range of motion noted. Motor strength is 5 out of 5 all extremities bilaterally. Tone is normal.  NEUROLOGIC:  Moves all extremities equally, follow commands, no defits  SKIN:  no bruising or bleeding, normal skin color, texture, turgor, no redness, warmth, or swelling, no rashes and no lesions  EXT:     no cyanosis, clubbing or edema present    -----------------------------------------------------------------    Diagnostic Data:    · All available data reviewed  Lab Results   Component Value Date    WBC 10.8 07/20/2021    HGB 13.9 07/20/2021    MCV 92.8 07/20/2021     07/20/2021      Lab Results   Component Value Date    GLUCOSE 116 (H) 07/20/2021    BUN 7 07/20/2021    CREATININE 0.58 (L) 07/20/2021     07/20/2021    K 3.8 07/20/2021    CALCIUM 8.7 07/20/2021     07/20/2021    CO2 25 07/20/2021       MRI ABDOMEN WO CONTRAST MRCP   Final Result   Cholelithiasis. No biliary ductal dilatation or retained common duct stones. Mild injection of the fat surrounding the pancreatic head, compatible with   pancreatitis. No pancreatic ductal dilatation         US GALLBLADDER RUQ   Final Result   Limited by patient body habitus. Multiple cholelithiasis and GB sludge with findings more suggestive of mild   chronic cholecystitis than acute; ultrasound tech, however, reports a   positive Martinez's sign raising the possibility of acute cholecystitis. See   recommendations below. No abnormal dilatation biliary tree. RECOMMENDATIONS:   If there is clinical concern for acute cholecystitis, consider HIDA scan. CT ABDOMEN PELVIS W IV CONTRAST Additional Contrast? None   Final Result   Head of the pancreas appears mildly enlarged with suggestion of mild   peripancreatic inflammatory changes which may also be associated with the   duodenum. Finding may represent mild pancreatitis or duodenitis.   Please   correlate with labs      Cholelithiasis      Mild hepatosplenomegaly         XR CHEST (2 VW)   Final Result   Mild cardiomegaly. Interstitial opacities compatible mild pulmonary edema or   interstitial pneumonia. ASSESSMENT / PLAN:  Pancreatitis, gallstone  · Continue current therapy  ? IV fluids  ? Trend lipase-initial 120, currently 33  ? Bilirubin initially 1.60, currently 1.60  ? AST/ALT remains normal  ? Pain control  ? Ultrasound gallbladder-multiple cholelithiasis and gallbladder sludge. Acute cholecystitis. No abnormal dilatation of biliary tree. ? MRCP-No obstruction  · Cholelithiasis  ? Appreciate general surgery  ? Ultrasound gallbladder-multiple cholelithiasis and gallbladder sludge. Acute cholecystitis. No abnormal dilatation of biliary tree. ? No HIDA scan per general surgery  ? Leukocytosis resolved  ? Continue Zosyn IV  ? Continue IV fluids  ? Continue clear liquid diet  ? N.p.o. after midnight  ? OR tomorrow for cholecystectomy  · Uncontrolled hypertension  ? Continue lisinopril 10 mg twice daily  ? Continue IV Vasotec 1.25 mg every 6 hours as needed systolic blood pressure greater than 150   ? Continue labetalol 10 mg IV every 6 hours as needed systolic blood pressure greater than 150  ? Start Adalat cc ER 30 mg daily  ? EKG  ?  Echocardiogram-normal  ? Lipid panel-normal  · DVT prophylaxis: SCD  · Peptic ulcer prophylaxis: Pepcid  · High risk medications: none  · Disposition:  Discharge plan is home Thursday/Friday      KEARA Schmitz - CNP , APRN, NP-C

## 2021-07-21 ENCOUNTER — ANESTHESIA EVENT (OUTPATIENT)
Dept: MEDSURG UNIT | Age: 51
End: 2021-07-21

## 2021-07-21 ENCOUNTER — ANESTHESIA (OUTPATIENT)
Dept: MEDSURG UNIT | Age: 51
End: 2021-07-21

## 2021-07-21 ENCOUNTER — ANESTHESIA EVENT (OUTPATIENT)
Dept: OPERATING ROOM | Age: 51
DRG: 417 | End: 2021-07-21
Payer: COMMERCIAL

## 2021-07-21 ENCOUNTER — ANESTHESIA (OUTPATIENT)
Dept: OPERATING ROOM | Age: 51
DRG: 417 | End: 2021-07-21
Payer: COMMERCIAL

## 2021-07-21 VITALS — SYSTOLIC BLOOD PRESSURE: 172 MMHG | OXYGEN SATURATION: 97 % | DIASTOLIC BLOOD PRESSURE: 102 MMHG

## 2021-07-21 PROBLEM — K59.01 SLOW TRANSIT CONSTIPATION: Status: ACTIVE | Noted: 2021-07-21

## 2021-07-21 LAB
ABSOLUTE EOS #: 0.5 K/UL (ref 0–0.44)
ABSOLUTE IMMATURE GRANULOCYTE: <0.03 K/UL (ref 0–0.3)
ABSOLUTE LYMPH #: 1.04 K/UL (ref 1.1–3.7)
ABSOLUTE MONO #: 1.08 K/UL (ref 0.1–1.2)
ALBUMIN SERPL-MCNC: 3.7 G/DL (ref 3.5–5.2)
ALBUMIN/GLOBULIN RATIO: 1.2 (ref 1–2.5)
ALP BLD-CCNC: 81 U/L (ref 40–129)
ALT SERPL-CCNC: 8 U/L (ref 5–41)
ANION GAP SERPL CALCULATED.3IONS-SCNC: 11 MMOL/L (ref 9–17)
AST SERPL-CCNC: 9 U/L
BASOPHILS # BLD: 0 % (ref 0–2)
BASOPHILS ABSOLUTE: 0.03 K/UL (ref 0–0.2)
BILIRUB SERPL-MCNC: 1.33 MG/DL (ref 0.3–1.2)
BUN BLDV-MCNC: 8 MG/DL (ref 6–20)
BUN/CREAT BLD: 13 (ref 9–20)
CALCIUM SERPL-MCNC: 8.7 MG/DL (ref 8.6–10.4)
CHLORIDE BLD-SCNC: 100 MMOL/L (ref 98–107)
CO2: 24 MMOL/L (ref 20–31)
CREAT SERPL-MCNC: 0.6 MG/DL (ref 0.7–1.2)
DIFFERENTIAL TYPE: ABNORMAL
EOSINOPHILS RELATIVE PERCENT: 6 % (ref 1–4)
GFR AFRICAN AMERICAN: >60 ML/MIN
GFR NON-AFRICAN AMERICAN: >60 ML/MIN
GFR SERPL CREATININE-BSD FRML MDRD: ABNORMAL ML/MIN/{1.73_M2}
GFR SERPL CREATININE-BSD FRML MDRD: ABNORMAL ML/MIN/{1.73_M2}
GLUCOSE BLD-MCNC: 110 MG/DL (ref 70–99)
HCT VFR BLD CALC: 40 % (ref 40.7–50.3)
HEMOGLOBIN: 13 G/DL (ref 13–17)
IMMATURE GRANULOCYTES: 0 %
LIPASE: 25 U/L (ref 13–60)
LYMPHOCYTES # BLD: 13 % (ref 24–43)
MCH RBC QN AUTO: 30.4 PG (ref 25.2–33.5)
MCHC RBC AUTO-ENTMCNC: 32.5 G/DL (ref 28.4–34.8)
MCV RBC AUTO: 93.5 FL (ref 82.6–102.9)
MONOCYTES # BLD: 14 % (ref 3–12)
NRBC AUTOMATED: 0 PER 100 WBC
PDW BLD-RTO: 13 % (ref 11.8–14.4)
PLATELET # BLD: 266 K/UL (ref 138–453)
PLATELET ESTIMATE: ABNORMAL
PMV BLD AUTO: 9.7 FL (ref 8.1–13.5)
POTASSIUM SERPL-SCNC: 4 MMOL/L (ref 3.7–5.3)
RBC # BLD: 4.28 M/UL (ref 4.21–5.77)
RBC # BLD: ABNORMAL 10*6/UL
SEG NEUTROPHILS: 67 % (ref 36–65)
SEGMENTED NEUTROPHILS ABSOLUTE COUNT: 5.26 K/UL (ref 1.5–8.1)
SODIUM BLD-SCNC: 135 MMOL/L (ref 135–144)
TOTAL PROTEIN: 6.8 G/DL (ref 6.4–8.3)
WBC # BLD: 7.9 K/UL (ref 3.5–11.3)
WBC # BLD: ABNORMAL 10*3/UL

## 2021-07-21 PROCEDURE — 6360000002 HC RX W HCPCS: Performed by: NURSE PRACTITIONER

## 2021-07-21 PROCEDURE — 6360000002 HC RX W HCPCS: Performed by: NURSE ANESTHETIST, CERTIFIED REGISTERED

## 2021-07-21 PROCEDURE — 85025 COMPLETE CBC W/AUTO DIFF WBC: CPT

## 2021-07-21 PROCEDURE — 2580000003 HC RX 258: Performed by: NURSE PRACTITIONER

## 2021-07-21 PROCEDURE — 7100000000 HC PACU RECOVERY - FIRST 15 MIN: Performed by: SURGERY

## 2021-07-21 PROCEDURE — 6360000002 HC RX W HCPCS: Performed by: SURGERY

## 2021-07-21 PROCEDURE — 94761 N-INVAS EAR/PLS OXIMETRY MLT: CPT

## 2021-07-21 PROCEDURE — 2580000003 HC RX 258: Performed by: SURGERY

## 2021-07-21 PROCEDURE — 64488 TAP BLOCK BI INJECTION: CPT | Performed by: NURSE ANESTHETIST, CERTIFIED REGISTERED

## 2021-07-21 PROCEDURE — S2900 ROBOTIC SURGICAL SYSTEM: HCPCS | Performed by: SURGERY

## 2021-07-21 PROCEDURE — 7100000001 HC PACU RECOVERY - ADDTL 15 MIN: Performed by: SURGERY

## 2021-07-21 PROCEDURE — 8E0W4CZ ROBOTIC ASSISTED PROCEDURE OF TRUNK REGION, PERCUTANEOUS ENDOSCOPIC APPROACH: ICD-10-PCS | Performed by: SURGERY

## 2021-07-21 PROCEDURE — 0FT44ZZ RESECTION OF GALLBLADDER, PERCUTANEOUS ENDOSCOPIC APPROACH: ICD-10-PCS | Performed by: SURGERY

## 2021-07-21 PROCEDURE — 3600000019 HC SURGERY ROBOT ADDTL 15MIN: Performed by: SURGERY

## 2021-07-21 PROCEDURE — 3700000000 HC ANESTHESIA ATTENDED CARE: Performed by: SURGERY

## 2021-07-21 PROCEDURE — 88304 TISSUE EXAM BY PATHOLOGIST: CPT

## 2021-07-21 PROCEDURE — 1200000000 HC SEMI PRIVATE

## 2021-07-21 PROCEDURE — 3600000009 HC SURGERY ROBOT BASE: Performed by: SURGERY

## 2021-07-21 PROCEDURE — 80053 COMPREHEN METABOLIC PANEL: CPT

## 2021-07-21 PROCEDURE — 6370000000 HC RX 637 (ALT 250 FOR IP): Performed by: SURGERY

## 2021-07-21 PROCEDURE — 36415 COLL VENOUS BLD VENIPUNCTURE: CPT

## 2021-07-21 PROCEDURE — 6370000000 HC RX 637 (ALT 250 FOR IP): Performed by: NURSE PRACTITIONER

## 2021-07-21 PROCEDURE — 3700000001 HC ADD 15 MINUTES (ANESTHESIA): Performed by: SURGERY

## 2021-07-21 PROCEDURE — 2580000003 HC RX 258: Performed by: NURSE ANESTHETIST, CERTIFIED REGISTERED

## 2021-07-21 PROCEDURE — 2709999900 HC NON-CHARGEABLE SUPPLY: Performed by: SURGERY

## 2021-07-21 PROCEDURE — 2500000003 HC RX 250 WO HCPCS: Performed by: NURSE ANESTHETIST, CERTIFIED REGISTERED

## 2021-07-21 PROCEDURE — 83690 ASSAY OF LIPASE: CPT

## 2021-07-21 RX ORDER — ROPIVACAINE HYDROCHLORIDE 5 MG/ML
INJECTION, SOLUTION EPIDURAL; INFILTRATION; PERINEURAL PRN
Status: DISCONTINUED | OUTPATIENT
Start: 2021-07-21 | End: 2021-07-21 | Stop reason: SDUPTHER

## 2021-07-21 RX ORDER — LIDOCAINE HYDROCHLORIDE 20 MG/ML
INJECTION, SOLUTION EPIDURAL; INFILTRATION; INTRACAUDAL; PERINEURAL PRN
Status: DISCONTINUED | OUTPATIENT
Start: 2021-07-21 | End: 2021-07-21 | Stop reason: SDUPTHER

## 2021-07-21 RX ORDER — LABETALOL HYDROCHLORIDE 5 MG/ML
INJECTION, SOLUTION INTRAVENOUS PRN
Status: DISCONTINUED | OUTPATIENT
Start: 2021-07-21 | End: 2021-07-21 | Stop reason: SDUPTHER

## 2021-07-21 RX ORDER — ONDANSETRON 2 MG/ML
INJECTION INTRAMUSCULAR; INTRAVENOUS PRN
Status: DISCONTINUED | OUTPATIENT
Start: 2021-07-21 | End: 2021-07-21 | Stop reason: SDUPTHER

## 2021-07-21 RX ORDER — PROPOFOL 10 MG/ML
INJECTION, EMULSION INTRAVENOUS PRN
Status: DISCONTINUED | OUTPATIENT
Start: 2021-07-21 | End: 2021-07-21 | Stop reason: SDUPTHER

## 2021-07-21 RX ORDER — FENTANYL CITRATE 50 UG/ML
50 INJECTION, SOLUTION INTRAMUSCULAR; INTRAVENOUS EVERY 5 MIN PRN
Status: DISCONTINUED | OUTPATIENT
Start: 2021-07-21 | End: 2021-07-21 | Stop reason: HOSPADM

## 2021-07-21 RX ORDER — INDOCYANINE GREEN AND WATER 25 MG
KIT INJECTION PRN
Status: DISCONTINUED | OUTPATIENT
Start: 2021-07-21 | End: 2021-07-21 | Stop reason: SDUPTHER

## 2021-07-21 RX ORDER — HYDROCODONE BITARTRATE AND ACETAMINOPHEN 5; 325 MG/1; MG/1
1 TABLET ORAL EVERY 6 HOURS PRN
Qty: 12 TABLET | Refills: 0 | Status: SHIPPED | OUTPATIENT
Start: 2021-07-21 | End: 2021-07-23 | Stop reason: SDUPTHER

## 2021-07-21 RX ORDER — FENTANYL CITRATE 50 UG/ML
INJECTION, SOLUTION INTRAMUSCULAR; INTRAVENOUS PRN
Status: DISCONTINUED | OUTPATIENT
Start: 2021-07-21 | End: 2021-07-21 | Stop reason: SDUPTHER

## 2021-07-21 RX ORDER — SODIUM CHLORIDE, SODIUM LACTATE, POTASSIUM CHLORIDE, CALCIUM CHLORIDE 600; 310; 30; 20 MG/100ML; MG/100ML; MG/100ML; MG/100ML
INJECTION, SOLUTION INTRAVENOUS CONTINUOUS PRN
Status: DISCONTINUED | OUTPATIENT
Start: 2021-07-21 | End: 2021-07-21 | Stop reason: SDUPTHER

## 2021-07-21 RX ORDER — HYDRALAZINE HYDROCHLORIDE 20 MG/ML
10 INJECTION INTRAMUSCULAR; INTRAVENOUS ONCE
Status: COMPLETED | OUTPATIENT
Start: 2021-07-21 | End: 2021-07-21

## 2021-07-21 RX ORDER — INDOCYANINE GREEN AND WATER 25 MG
KIT INJECTION
Status: COMPLETED
Start: 2021-07-21 | End: 2021-07-21

## 2021-07-21 RX ORDER — METOCLOPRAMIDE HYDROCHLORIDE 5 MG/ML
10 INJECTION INTRAMUSCULAR; INTRAVENOUS
Status: DISCONTINUED | OUTPATIENT
Start: 2021-07-21 | End: 2021-07-21 | Stop reason: HOSPADM

## 2021-07-21 RX ORDER — DEXAMETHASONE SODIUM PHOSPHATE 10 MG/ML
INJECTION INTRAMUSCULAR; INTRAVENOUS PRN
Status: DISCONTINUED | OUTPATIENT
Start: 2021-07-21 | End: 2021-07-21 | Stop reason: SDUPTHER

## 2021-07-21 RX ORDER — ROCURONIUM BROMIDE 10 MG/ML
INJECTION, SOLUTION INTRAVENOUS PRN
Status: DISCONTINUED | OUTPATIENT
Start: 2021-07-21 | End: 2021-07-21 | Stop reason: SDUPTHER

## 2021-07-21 RX ORDER — DEXAMETHASONE SODIUM PHOSPHATE 4 MG/ML
INJECTION, SOLUTION INTRA-ARTICULAR; INTRALESIONAL; INTRAMUSCULAR; INTRAVENOUS; SOFT TISSUE PRN
Status: DISCONTINUED | OUTPATIENT
Start: 2021-07-21 | End: 2021-07-21 | Stop reason: SDUPTHER

## 2021-07-21 RX ORDER — PROMETHAZINE HYDROCHLORIDE 25 MG/ML
6.25 INJECTION, SOLUTION INTRAMUSCULAR; INTRAVENOUS
Status: DISCONTINUED | OUTPATIENT
Start: 2021-07-21 | End: 2021-07-21 | Stop reason: HOSPADM

## 2021-07-21 RX ADMIN — SODIUM CHLORIDE: 9 INJECTION, SOLUTION INTRAVENOUS at 04:24

## 2021-07-21 RX ADMIN — LABETALOL HYDROCHLORIDE 5 MG: 5 INJECTION, SOLUTION INTRAVENOUS at 18:01

## 2021-07-21 RX ADMIN — FENTANYL CITRATE 50 MCG: 50 INJECTION, SOLUTION INTRAMUSCULAR; INTRAVENOUS at 17:12

## 2021-07-21 RX ADMIN — PIPERACILLIN AND TAZOBACTAM 3375 MG: 3; .375 INJECTION, POWDER, FOR SOLUTION INTRAVENOUS at 08:06

## 2021-07-21 RX ADMIN — FENTANYL CITRATE 100 MCG: 50 INJECTION, SOLUTION INTRAMUSCULAR; INTRAVENOUS at 18:27

## 2021-07-21 RX ADMIN — SODIUM CHLORIDE, POTASSIUM CHLORIDE, SODIUM LACTATE AND CALCIUM CHLORIDE: 600; 310; 30; 20 INJECTION, SOLUTION INTRAVENOUS at 18:31

## 2021-07-21 RX ADMIN — ROCURONIUM BROMIDE 50 MG: 10 INJECTION, SOLUTION INTRAVENOUS at 16:49

## 2021-07-21 RX ADMIN — LISINOPRIL 10 MG: 10 TABLET ORAL at 20:43

## 2021-07-21 RX ADMIN — PROPOFOL 200 MG: 10 INJECTION, EMULSION INTRAVENOUS at 16:48

## 2021-07-21 RX ADMIN — NIFEDIPINE 30 MG: 30 TABLET, EXTENDED RELEASE ORAL at 08:06

## 2021-07-21 RX ADMIN — FENTANYL CITRATE 50 MCG: 50 INJECTION, SOLUTION INTRAMUSCULAR; INTRAVENOUS at 18:24

## 2021-07-21 RX ADMIN — INDOCYANINE GREEN AND WATER 7.5 MG: KIT at 17:17

## 2021-07-21 RX ADMIN — LABETALOL HYDROCHLORIDE 5 MG: 5 INJECTION, SOLUTION INTRAVENOUS at 17:35

## 2021-07-21 RX ADMIN — SODIUM CHLORIDE, POTASSIUM CHLORIDE, SODIUM LACTATE AND CALCIUM CHLORIDE: 600; 310; 30; 20 INJECTION, SOLUTION INTRAVENOUS at 16:45

## 2021-07-21 RX ADMIN — FENTANYL CITRATE 50 MCG: 50 INJECTION, SOLUTION INTRAMUSCULAR; INTRAVENOUS at 18:44

## 2021-07-21 RX ADMIN — PIPERACILLIN AND TAZOBACTAM 3375 MG: 3; .375 INJECTION, POWDER, FOR SOLUTION INTRAVENOUS at 17:08

## 2021-07-21 RX ADMIN — FENTANYL CITRATE 50 MCG: 50 INJECTION, SOLUTION INTRAMUSCULAR; INTRAVENOUS at 19:11

## 2021-07-21 RX ADMIN — DEXAMETHASONE SODIUM PHOSPHATE 10 MG: 10 INJECTION INTRAMUSCULAR; INTRAVENOUS at 18:18

## 2021-07-21 RX ADMIN — PIPERACILLIN AND TAZOBACTAM 3375 MG: 3; .375 INJECTION, POWDER, FOR SOLUTION INTRAVENOUS at 00:00

## 2021-07-21 RX ADMIN — FENTANYL CITRATE 50 MCG: 50 INJECTION, SOLUTION INTRAMUSCULAR; INTRAVENOUS at 16:48

## 2021-07-21 RX ADMIN — SUGAMMADEX 200 MG: 100 INJECTION, SOLUTION INTRAVENOUS at 18:20

## 2021-07-21 RX ADMIN — POLYETHYLENE GLYCOL (3350) 17 G: 17 POWDER, FOR SOLUTION ORAL at 08:06

## 2021-07-21 RX ADMIN — DEXAMETHASONE SODIUM PHOSPHATE 4 MG: 4 INJECTION, SOLUTION INTRAMUSCULAR; INTRAVENOUS at 17:13

## 2021-07-21 RX ADMIN — ROCURONIUM BROMIDE 10 MG: 10 INJECTION, SOLUTION INTRAVENOUS at 17:47

## 2021-07-21 RX ADMIN — HYDRALAZINE HYDROCHLORIDE 10 MG: 20 INJECTION INTRAMUSCULAR; INTRAVENOUS at 19:06

## 2021-07-21 RX ADMIN — SODIUM CHLORIDE: 9 INJECTION, SOLUTION INTRAVENOUS at 20:11

## 2021-07-21 RX ADMIN — ROCURONIUM BROMIDE 20 MG: 10 INJECTION, SOLUTION INTRAVENOUS at 17:29

## 2021-07-21 RX ADMIN — LIDOCAINE HYDROCHLORIDE 60 MG: 20 INJECTION, SOLUTION EPIDURAL; INFILTRATION; INTRACAUDAL; PERINEURAL at 16:48

## 2021-07-21 RX ADMIN — FENTANYL CITRATE 50 MCG: 50 INJECTION, SOLUTION INTRAMUSCULAR; INTRAVENOUS at 17:31

## 2021-07-21 RX ADMIN — ONDANSETRON 4 MG: 2 INJECTION INTRAMUSCULAR; INTRAVENOUS at 17:56

## 2021-07-21 RX ADMIN — ROPIVACAINE HYDROCHLORIDE 60 MG: 5 INJECTION, SOLUTION EPIDURAL; INFILTRATION; PERINEURAL at 18:18

## 2021-07-21 RX ADMIN — LISINOPRIL 10 MG: 10 TABLET ORAL at 08:06

## 2021-07-21 ASSESSMENT — PAIN SCALES - GENERAL
PAINLEVEL_OUTOF10: 9
PAINLEVEL_OUTOF10: 3
PAINLEVEL_OUTOF10: 9
PAINLEVEL_OUTOF10: 8
PAINLEVEL_OUTOF10: 9
PAINLEVEL_OUTOF10: 3
PAINLEVEL_OUTOF10: 9
PAINLEVEL_OUTOF10: 2
PAINLEVEL_OUTOF10: 9
PAINLEVEL_OUTOF10: 8
PAINLEVEL_OUTOF10: 7
PAINLEVEL_OUTOF10: 9
PAINLEVEL_OUTOF10: 9

## 2021-07-21 ASSESSMENT — PAIN DESCRIPTION - DESCRIPTORS
DESCRIPTORS: ACHING
DESCRIPTORS: ACHING;SORE
DESCRIPTORS: SPASM
DESCRIPTORS: ACHING

## 2021-07-21 ASSESSMENT — PAIN DESCRIPTION - ORIENTATION
ORIENTATION: RIGHT
ORIENTATION: LOWER
ORIENTATION: RIGHT;UPPER
ORIENTATION: LOWER
ORIENTATION: LOWER
ORIENTATION: RIGHT

## 2021-07-21 ASSESSMENT — PAIN DESCRIPTION - FREQUENCY
FREQUENCY: CONTINUOUS
FREQUENCY: INTERMITTENT
FREQUENCY: CONTINUOUS
FREQUENCY: INTERMITTENT
FREQUENCY: INTERMITTENT

## 2021-07-21 ASSESSMENT — PAIN DESCRIPTION - PAIN TYPE
TYPE: ACUTE PAIN
TYPE: ACUTE PAIN
TYPE: SURGICAL PAIN
TYPE: SURGICAL PAIN
TYPE: ACUTE PAIN

## 2021-07-21 ASSESSMENT — PAIN DESCRIPTION - ONSET
ONSET: ON-GOING
ONSET: ON-GOING

## 2021-07-21 ASSESSMENT — PAIN DESCRIPTION - LOCATION
LOCATION: ABDOMEN;BACK
LOCATION: ABDOMEN
LOCATION: BACK
LOCATION: BACK
LOCATION: ABDOMEN

## 2021-07-21 ASSESSMENT — PAIN - FUNCTIONAL ASSESSMENT
PAIN_FUNCTIONAL_ASSESSMENT: PREVENTS OR INTERFERES WITH ALL ACTIVE AND SOME PASSIVE ACTIVITIES
PAIN_FUNCTIONAL_ASSESSMENT: 0-10

## 2021-07-21 ASSESSMENT — PAIN DESCRIPTION - PROGRESSION
CLINICAL_PROGRESSION: GRADUALLY WORSENING
CLINICAL_PROGRESSION: NOT CHANGED
CLINICAL_PROGRESSION: GRADUALLY IMPROVING

## 2021-07-21 NOTE — ANESTHESIA POSTPROCEDURE EVALUATION
Department of Anesthesiology  Postprocedure Note    Patient: Aurora Diallo  MRN: [id number]  [place]: 1970  Date of evaluation: 7/21/2021  Time:  7:42 PM     Procedure Summary     Date: 07/21/21 Room / Location: [address] [place] [address]    Anesthesia Start: 4329 Anesthesia Stop: 9232    Procedure: CHOLECYSTECTOMY LAPAROSCOPIC ROBOTIC (N/A ) Diagnosis: (PANCREATITIS,   GALLSTONES)    Surgeons: Miranda Collins MD Responsible Provider: KEARA Medina CRNA    Anesthesia Type: general ASA Status: 3          Anesthesia Type: general    Collins Phase I: Collins Score: 10    Collins Phase II:      Last vitals: Reviewed and per EMR flowsheets.        Anesthesia Post Evaluation    Patient location during evaluation: bedside  Patient participation: complete - patient participated  Level of consciousness: awake and alert  Pain score: 2  Airway patency: patent  Nausea & Vomiting: no nausea and no vomiting  Complications: no  Cardiovascular status: blood pressure returned to baseline  Respiratory status: acceptable  Hydration status: stable

## 2021-07-21 NOTE — PROGRESS NOTES
Progress Note    SUBJECTIVE:    Patient seen for f/u of Pancreatitis, gallstone. He is currently sitting up in bed complains of pain to RUQ but states it is a little better but just recently had pain medication and denies pain to his lower abdomen currently as he stated he passed some gas. States that he has not had a BM in 5 days. Patient was given Dulcolax suppository yesterday as well as 2 Dulcolax tablets and was started on MiraLAX daily. He states he is passing gas but has not had a real bowel movement. Denies n/v. No further c/o. ROS:   Constitutional: negative  for fevers, and negative for chills.   Respiratory: negative for shortness of breath, negative for cough, and negative for wheezing  Cardiovascular: negative for chest pain, and negative for palpitations  Gastrointestinal: positive for abdominal pain, negative for nausea,negative for vomiting, negative for diarrhea, and negative for constipation     All other systems were reviewed with the patient and are negative unless otherwise stated in HPI      OBJECTIVE:      Vitals:   Vitals:    07/21/21 1000   BP: (!) 143/91   Pulse: 71   Resp:    Temp:    SpO2:      Weight: (!) 340 lb (154.2 kg)   Height: 6' 1\" (185.4 cm)   -----------------------------------------------------------------  Exam:    CONSTITUTIONAL:  awake, alert, cooperative, no apparent distress, and appears stated age  EYES:  Lids and lashes normal, pupils equal, round and reactive to light, extra ocular muscles intact, sclera clear, conjunctiva normal  ENT:  normocepalic, without obvious abnormality, atraumatic  NECK:  supple, symmetrical, trachea midline, skin normal and no stridor  HEMATOLOGIC/LYMPHATICS:  no cervical lymphadenopathy and no supraclavicular lymphadenopathy  LUNGS:  No increased work of breathing, good air exchange, clear to auscultation bilaterally, no crackles or wheezing  CARDIOVASCULAR:  Normal apical impulse, regular rate and rhythm, normal S1 and S2, no S3 or suggestion of mild   peripancreatic inflammatory changes which may also be associated with the   duodenum. Finding may represent mild pancreatitis or duodenitis. Please   correlate with labs      Cholelithiasis      Mild hepatosplenomegaly         XR CHEST (2 VW)   Final Result   Mild cardiomegaly. Interstitial opacities compatible mild pulmonary edema or   interstitial pneumonia. ASSESSMENT / PLAN:  Pancreatitis, gallstone  · Continue current therapy  ? IV fluids  ? Trend lipase-initial 120, currently 25  ? Bilirubin initially 1.60, currently 1.33  ? AST/ALT remains normal  ? Pain control  ? Ultrasound gallbladder-multiple cholelithiasis and gallbladder sludge. Acute cholecystitis. No abnormal dilatation of biliary tree. ? MRCP-No obstruction  · Cholelithiasis  ? Appreciate general surgery  ? Ultrasound gallbladder-multiple cholelithiasis and gallbladder sludge. Acute cholecystitis. No abnormal dilatation of biliary tree. ? No HIDA scan per general surgery  ? Leukocytosis resolved  ? Continue Zosyn IV  ? Continue IV fluids  ? Continue clear liquid diet  ? N.p.o. for surgery today  ? OR today for cholecystectomy  · Uncontrolled hypertension  ? Continue lisinopril 10 mg twice daily  ? Continue IV Vasotec 1.25 mg every 6 hours as needed systolic blood pressure greater than 150   ? Continue labetalol 10 mg IV every 6 hours as needed systolic blood pressure greater than 150  ? Start Adalat cc ER 30 mg daily  ? EKG  ? Echocardiogram-normal  ? Lipid panel-normal  · Constipation  · Dulcolax RS, Dulcolax tablets given yesterday-no results  · Continue MiraLAX daily  · UTI  · Culture positive for E.  Coli  · Continue Zosyn  · DVT prophylaxis: SCD  · Peptic ulcer prophylaxis: Pepcid  · High risk medications: none  · Disposition:  Discharge plan is home Thursday/Friday      Russell Mason APRN - CNP , APRN, NP-C

## 2021-07-21 NOTE — PLAN OF CARE
Problem: Pain:  Goal: Pain level will decrease  Description: Pain level will decrease  Outcome: Ongoing  Pain assessed t/o shift. Pt instructed on rating pain scale, verbalized understanding. Pain medication administered per [facility] with good effects. Will continue to monitor. Problem: Nutrition  Goal: Optimal nutrition therapy  Outcome: Ongoing  Pt is NPO at this time d/t planned procedure. Will continue to monitor. Problem: Respiratory:  Goal: Ability to achieve and maintain a regular respiratory rate will improve  Description: Ability to achieve and maintain a regular respiratory rate will improve  Outcome: Ongoing  Lung sounds clear. SpO2 >90% t/o shift on room air. Pt encouraged to cough and deep breath frequently. Respirations normal, breathing is unlabored. SOB with moderate exertion. Will continue to monitor. Problem: Skin Integrity:  Goal: Skin integrity will be maintained  Description: Skin integrity will be maintained  Outcome: Ongoing  Skin assessment completed per shift and as needed. No skin concerns noted at this time. Pt turns independently t/o shift. Will continue to monitor.

## 2021-07-21 NOTE — PROGRESS NOTES
Patient is awake, alert and oriented, sitting on the chair. Vital signs and assessment done. Patient is not complaining of pain at this moment. Patient is not complaining of nausea and vomiting at this moment. Writer reminded the clear liquid diet to the patient and NPO in midnight,patient verbalized understanding the instruction provided.

## 2021-07-21 NOTE — ANESTHESIA PROCEDURE NOTES
Peripheral Block    Patient location during procedure: OR  Start time: 7/21/2021 6:15 PM  End time: 7/21/2021 6:20 PM  Staffing  Performed: resident/CRNA   Resident/CRNA: KEARA Medina CRNA  Preanesthetic Checklist  Completed: patient identified, IV checked, site marked, risks and benefits discussed, surgical consent, monitors and equipment checked, pre-op evaluation, timeout performed, anesthesia consent given, oxygen available and patient being monitored  Peripheral Block  Patient position: supine  Prep: ChloraPrep  Patient monitoring: continuous pulse ox, IV access, frequent blood pressure checks, continuous capnometry and cardiac monitor  Block type: Rectus sheath and TAP  Laterality: bilateral  Injection technique: single-shot  Guidance: ultrasound guided  Anesthesia block local: see mar   Provider prep: mask and sterile gloves  Anesthesia block local: see mar   Needle  Needle type: combined needle/nerve stimulator   Needle gauge: 22 G  Needle length: 10 cm  Needle localization: ultrasound guidance  Assessment  Injection assessment: negative aspiration for heme, no paresthesia on injection and local visualized surrounding nerve on ultrasound  Paresthesia pain: none  Slow fractionated injection: yes  Hemodynamics: stable  Reason for block: post-op pain management

## 2021-07-21 NOTE — PLAN OF CARE
Problem: Pain:  Goal: Pain level will decrease  Description: Pain level will decrease  Outcome: Ongoing  Note: Pain assessed every 4 hours. Patient is able to communicate with staff the need for pain interventions. Patient currently 0/10    Goal: Control of acute pain  Description: Control of acute pain  Outcome: Ongoing  Goal: Control of chronic pain  Description: Control of chronic pain  Outcome: Ongoing     Problem: Respiratory:  Goal: Ability to achieve and maintain a regular respiratory rate will improve  Description: Ability to achieve and maintain a regular respiratory rate will improve  Outcome: Ongoing  Note: Check oxygen saturation.       Problem: Skin Integrity:  Goal: Skin integrity will be maintained  Description: Skin integrity will be maintained  Outcome: Ongoing

## 2021-07-21 NOTE — PROGRESS NOTES
Gene Duque in to evaluate pt, states ok with tele reading, states back pain likely from lying flat for surgery for extended period of time. Also orders Hydralazine 10mg.

## 2021-07-21 NOTE — PROGRESS NOTES
Musa Romeo, placed IV in upper left arm without difficulty. IV is infusing at this time. Pt resting in chair, call light within reach, denies needs. Pt updated on delay time of his surgical procedure. Will continue to monitor.

## 2021-07-21 NOTE — PROGRESS NOTES
Pt very tense, uncomfortable, stating his lower back is very tense, having more pain in lower back than in abdomen. BP elevated d/t being tense, Saumya Molina CRNA present and aware of pt's condition.

## 2021-07-21 NOTE — ANESTHESIA PRE PROCEDURE
Department of Anesthesiology  Preprocedure Note       Name:  Jared Lucas   Age:  46 y.o.  :  1970                                          MRN:  [id number]         Date:  2021      Surgeon: Terri Goldman):  Laura Baig MD    Procedure: Procedure(s):  CHOLECYSTECTOMY LAPAROSCOPIC ROBOTIC    Medications prior to admission:   Prior to Admission medications    Medication Sig Start Date End Date Taking?  Authorizing Provider   lisinopril (PRINIVIL;ZESTRIL) 10 MG tablet Take 10 mg by mouth 2 times daily   Yes Historical Provider, MD   terbinafine (LAMISIL) 250 MG tablet Take 250 mg by mouth daily   Yes Historical Provider, MD       Current medications:    Current Facility-Administered Medications   Medication Dose Route Frequency Provider Last Rate Last Admin    polyethylene glycol (GLYCOLAX) packet 17 g  17 g Oral Daily Liz Heads, APRN - CNP   17 g at 21 0806    piperacillin-tazobactam (ZOSYN) 3,375 mg in dextrose 5 % 50 mL IVPB extended infusion (mini-bag)  3,375 mg Intravenous Q8H Liz Heads, APRN - CNP   Stopped at 21 1245    lisinopril (PRINIVIL;ZESTRIL) tablet 10 mg  10 mg Oral BID Liz Heads, APRN - CNP   10 mg at 21 0806    NIFEdipine (ADALAT CC) extended release tablet 30 mg  30 mg Oral Daily Liz Heads, APRN - CNP   30 mg at 21 0806    HYDROcodone-acetaminophen (NORCO) 5-325 MG per tablet 1 tablet  1 tablet Oral Q4H PRN Liz Heads, APRN - CNP   1 tablet at 21 1631    Or    HYDROcodone-acetaminophen (NORCO) 5-325 MG per tablet 2 tablet  2 tablet Oral Q4H PRN Liz Heads, APRN - CNP   2 tablet at 21 2339    0.9 % sodium chloride infusion   Intravenous Continuous Liz Heads, APRN -  mL/hr at 21 0424 New Bag at 21 0424    sodium chloride flush 0.9 % injection 10 mL  10 mL Intravenous 2 times per day Liz Heads, APRN - CNP   10 mL at 21    sodium chloride flush 0.9 % injection 10 mL  10 mL Intravenous PRN Dolly Saber, APRN - CNP        0.9 % sodium chloride infusion  25 mL Intravenous PRN Dolly Saber, APRN - CNP        [Held by provider] enoxaparin (LOVENOX) injection 40 mg  40 mg Subcutaneous Daily Dolly Saber, APRN - CNP   40 mg at 07/19/21 0815    ondansetron (ZOFRAN-ODT) disintegrating tablet 4 mg  4 mg Oral Q8H PRN Dolly Saber, APRN - CNP        Or    ondansetron (ZOFRAN) injection 4 mg  4 mg Intravenous Q6H PRN Dolly Saber, APRN - CNP        acetaminophen (TYLENOL) tablet 650 mg  650 mg Oral Q6H PRN Dolly Saber, APRN - CNP        Or    acetaminophen (TYLENOL) suppository 650 mg  650 mg Rectal Q6H PRN Dolly Saber, APRN - CNP        labetalol (NORMODYNE;TRANDATE) injection 10 mg  10 mg Intravenous Q6H PRN Dolly Saber, APRN - CNP   10 mg at 07/19/21 1506    enalaprilat (VASOTEC) injection 1.25 mg  1.25 mg Intravenous Q6H PRN Dolly Saber, APRN - CNP   1.25 mg at 07/20/21 2350    morphine injection 4 mg  4 mg Intravenous Q4H PRN Carie Sanchez MD   4 mg at 07/19/21 1025       Allergies:  No Known Allergies    Problem List:    Patient Active Problem List   Diagnosis Code    Pancreatitis, gallstone K85.10    Hypertension I10    E. coli UTI (urinary tract infection) N39.0, B96.20    Slow transit constipation K59.01       Past Medical History:        Diagnosis Date    Hypertension     Pancreatitis 07/18/2021       Past Surgical History:  History reviewed. No pertinent surgical history. Social History:    Social History     Tobacco Use    Smoking status: Never Smoker    Smokeless tobacco: Never Used   Substance Use Topics    Alcohol use:  Yes     Alcohol/week: 2.0 standard drinks     Types: 2 Shots of liquor per week     Comment: 4 days a week                                Counseling given: Not Answered      Vital Signs (Current):   Vitals:    07/21/21 0445 07/21/21 0645 07/21/21 1000 07/21/21 1515   BP:  (!) 155/98 (!) 143/91 complications:   Airway: Mallampati: II  TM distance: >3 FB   Neck ROM: full  Mouth opening: > = 3 FB Dental:          Pulmonary:Negative Pulmonary ROS and normal exam  breath sounds clear to auscultation                            ROS comment: Polyps in nares. Cardiovascular:  Exercise tolerance: good (>4 METS),   (+) hypertension:,         Rhythm: regular  Rate: normal           Beta Blocker:  Not on Beta Blocker         Neuro/Psych:   Negative Neuro/Psych ROS              GI/Hepatic/Renal:   (+) morbid obesity          Endo/Other: Negative Endo/Other ROS                    Abdominal:   (+) obese,           Vascular: negative vascular ROS. Other Findings:           Anesthesia Plan      general     ASA 3       Induction: intravenous. MIPS: Postoperative opioids intended and Prophylactic antiemetics administered. Anesthetic plan and risks discussed with patient. Plan discussed with CRNA.                 KEARA Aranda - CRNA   7/21/2021

## 2021-07-21 NOTE — ANESTHESIA POSTPROCEDURE EVALUATION
Department of Anesthesiology  Postprocedure Note    Patient: Graeme Hollis  MRN: [id number]  [place]: 1970  Date of evaluation: 7/21/2021  Time:  7:23 PM     Procedure Summary     Date: 07/21/21 Room / Location: [address] [place] [address]    Anesthesia Start: 0123 Anesthesia Stop: 2126    Procedure: CHOLECYSTECTOMY LAPAROSCOPIC ROBOTIC (N/A ) Diagnosis: (PANCREATITIS,   GALLSTONES)    Surgeons: Ebony Rangel MD Responsible Provider: KEARA Falcon CRNA    Anesthesia Type: general ASA Status: 3          Anesthesia Type: general    Collins Phase I: Collins Score: 10    Collins Phase II:      Last vitals: Reviewed and per EMR flowsheets. Anesthesia Post Evaluation    Patient location during evaluation: bedside  Patient participation: complete - patient participated  Level of consciousness: awake and alert  Pain score: 7 (pt c/o low back pain, does not report any abdominal pain.  pt is reporting relief with treatment)  Airway patency: patent  Nausea & Vomiting: no nausea and no vomiting  Complications: no  Cardiovascular status: blood pressure returned to baseline  Respiratory status: acceptable  Hydration status: stable

## 2021-07-21 NOTE — PROGRESS NOTES
[facility] by [facility] nurse and house supervisor. Discharge Criteria    Inpatients must meet Criteria 1 through 7. All other patients are either YES or N/A. If a NO is chosen then Anesthesia or Surgeon must be notified. 1.  Minimum 30 minutes after last dose of sedative medication, minimum 120 minutes after last dose of reversal agent. Yes      2. Systolic BP stable within 20 mmHg for 30 minutes & systolic BP between 90 & 482 or within 10 mmHg of baseline. Yes, BP remains elevated but Adalgisa Burns states ok to transfer [facility]      3. Pulse between 60 and 100 or within 10 bpm of baseline. Yes      4. Spontaneous respiratory rate >/= 10 per minute. Yes      5. SaO2 >/= 95 or  >/= baseline. Yes      6. Able to cough and swallow or return to baseline function. Yes      7. Alert and oriented or return to baseline mental status.     Yes

## 2021-07-21 NOTE — PROGRESS NOTES
Hernán Maria present to evaluate pt's BP, states it appears his BP has been running high this admission, states pt likely needs PO BP meds to be restarted and will likely trend back down. States [facility].

## 2021-07-21 NOTE — PROGRESS NOTES
Newest IV infiltrated at this time, this is 2nd line for this pt today. Right arm is slightly swollen and slightly firm around IV site. Stephanie Ying, supervisor, called and informed. Layne Hicks requested writer call surgery charge nurse and request anaesthesia place IV d/t pt is very difficult IV placement. Spoke with Rosalind Ludwig RN, [address] charge nurse and she states she will ask anaesthesia if they will go to pt's room and place IV.  Await

## 2021-07-21 NOTE — BRIEF OP NOTE
Brief Postoperative Note      Patient: Samson Quinn  YOB: 1970  MRN: [id number]    Date of Procedure: 7/21/2021    Pre-Op Diagnosis:      1. Cholecystitis, cholelithiasis     2. Gallstone pancreatitis    Post-Op Diagnosis:      1. Cholecystitis, cholelithiasis     2. Gallstone pancreatitis       Operation:      1. Lap matti, robotic assist    Surgeon(s):  Lakhwinder Sherwood MD    Assistant:  First Assistant: Alcira Alejandro RN    Anesthesia: General    Estimated Blood Loss (mL): less than 72SZ     Complications: None    Specimens:   ID Type Source Tests Collected by Time Destination   A :  Tissue Gallbladder SURGICAL PATHOLOGY Lakhwinder Sherwood MD 7/21/2021 1726        Drains: None    Findings:  As above.     Dictated # [id number]    Electronically signed by Lakhwinder Sherwood MD on 7/21/2021 at 6:07 PM

## 2021-07-22 VITALS
HEART RATE: 83 BPM | SYSTOLIC BLOOD PRESSURE: 158 MMHG | RESPIRATION RATE: 16 BRPM | HEIGHT: 73 IN | BODY MASS INDEX: 41.75 KG/M2 | TEMPERATURE: 98.5 F | OXYGEN SATURATION: 96 % | DIASTOLIC BLOOD PRESSURE: 99 MMHG | WEIGHT: 315 LBS

## 2021-07-22 PROBLEM — R82.71 ASYMPTOMATIC BACTERIURIA: Status: ACTIVE | Noted: 2021-07-20

## 2021-07-22 LAB
-: NORMAL
ABSOLUTE EOS #: 0 K/UL (ref 0–0.44)
ABSOLUTE IMMATURE GRANULOCYTE: 0 K/UL (ref 0–0.3)
ABSOLUTE LYMPH #: 0.47 K/UL (ref 1.1–3.7)
ABSOLUTE MONO #: 0.19 K/UL (ref 0.1–1.2)
ALBUMIN SERPL-MCNC: 3.8 G/DL (ref 3.5–5.2)
ALBUMIN/GLOBULIN RATIO: 1.2 (ref 1–2.5)
ALP BLD-CCNC: 73 U/L (ref 40–129)
ALT SERPL-CCNC: 27 U/L (ref 5–41)
ANION GAP SERPL CALCULATED.3IONS-SCNC: 12 MMOL/L (ref 9–17)
AST SERPL-CCNC: 30 U/L
BASOPHILS # BLD: 0 % (ref 0–2)
BASOPHILS ABSOLUTE: 0 K/UL (ref 0–0.2)
BILIRUB SERPL-MCNC: 1.32 MG/DL (ref 0.3–1.2)
BUN BLDV-MCNC: 11 MG/DL (ref 6–20)
BUN/CREAT BLD: 17 (ref 9–20)
CALCIUM SERPL-MCNC: 8.8 MG/DL (ref 8.6–10.4)
CHLORIDE BLD-SCNC: 100 MMOL/L (ref 98–107)
CO2: 23 MMOL/L (ref 20–31)
CREAT SERPL-MCNC: 0.66 MG/DL (ref 0.7–1.2)
DIFFERENTIAL TYPE: ABNORMAL
EOSINOPHILS RELATIVE PERCENT: 0 % (ref 1–4)
GFR AFRICAN AMERICAN: >60 ML/MIN
GFR NON-AFRICAN AMERICAN: >60 ML/MIN
GFR SERPL CREATININE-BSD FRML MDRD: ABNORMAL ML/MIN/{1.73_M2}
GFR SERPL CREATININE-BSD FRML MDRD: ABNORMAL ML/MIN/{1.73_M2}
GLUCOSE BLD-MCNC: 139 MG/DL (ref 70–99)
HCT VFR BLD CALC: 41.1 % (ref 40.7–50.3)
HEMOGLOBIN: 13.6 G/DL (ref 13–17)
IMMATURE GRANULOCYTES: 0 %
LIPASE: 10 U/L (ref 13–60)
LYMPHOCYTES # BLD: 5 % (ref 24–43)
MCH RBC QN AUTO: 30.6 PG (ref 25.2–33.5)
MCHC RBC AUTO-ENTMCNC: 33.1 G/DL (ref 28.4–34.8)
MCV RBC AUTO: 92.4 FL (ref 82.6–102.9)
MONOCYTES # BLD: 2 % (ref 3–12)
MORPHOLOGY: NORMAL
NRBC AUTOMATED: 0 PER 100 WBC
PDW BLD-RTO: 12.7 % (ref 11.8–14.4)
PLATELET # BLD: 322 K/UL (ref 138–453)
PLATELET ESTIMATE: ABNORMAL
PMV BLD AUTO: 9.5 FL (ref 8.1–13.5)
POTASSIUM SERPL-SCNC: 4.1 MMOL/L (ref 3.7–5.3)
RBC # BLD: 4.45 M/UL (ref 4.21–5.77)
RBC # BLD: ABNORMAL 10*6/UL
REASON FOR REJECTION: NORMAL
SEG NEUTROPHILS: 93 % (ref 36–65)
SEGMENTED NEUTROPHILS ABSOLUTE COUNT: 8.74 K/UL (ref 1.5–8.1)
SODIUM BLD-SCNC: 135 MMOL/L (ref 135–144)
TOTAL PROTEIN: 7 G/DL (ref 6.4–8.3)
WBC # BLD: 9.4 K/UL (ref 3.5–11.3)
WBC # BLD: ABNORMAL 10*3/UL
ZZ NTE CLEAN UP: ORDERED TEST: NORMAL
ZZ NTE WITH NAME CLEAN UP: SPECIMEN SOURCE: NORMAL

## 2021-07-22 PROCEDURE — 83690 ASSAY OF LIPASE: CPT

## 2021-07-22 PROCEDURE — 85025 COMPLETE CBC W/AUTO DIFF WBC: CPT

## 2021-07-22 PROCEDURE — 6370000000 HC RX 637 (ALT 250 FOR IP): Performed by: SURGERY

## 2021-07-22 PROCEDURE — 2580000003 HC RX 258: Performed by: SURGERY

## 2021-07-22 PROCEDURE — 36415 COLL VENOUS BLD VENIPUNCTURE: CPT

## 2021-07-22 PROCEDURE — 6360000002 HC RX W HCPCS: Performed by: SURGERY

## 2021-07-22 PROCEDURE — 80053 COMPREHEN METABOLIC PANEL: CPT

## 2021-07-22 PROCEDURE — 94761 N-INVAS EAR/PLS OXIMETRY MLT: CPT

## 2021-07-22 RX ORDER — POLYETHYLENE GLYCOL 3350 17 G/17G
17 POWDER, FOR SOLUTION ORAL DAILY
Qty: 30 EACH | Refills: 0 | Status: SHIPPED | OUTPATIENT
Start: 2021-07-23 | End: 2021-07-23

## 2021-07-22 RX ORDER — NIFEDIPINE 30 MG/1
30 TABLET, FILM COATED, EXTENDED RELEASE ORAL DAILY
Qty: 30 TABLET | Refills: 3 | Status: SHIPPED | OUTPATIENT
Start: 2021-07-23 | End: 2021-07-23

## 2021-07-22 RX ADMIN — PIPERACILLIN AND TAZOBACTAM 3375 MG: 3; .375 INJECTION, POWDER, FOR SOLUTION INTRAVENOUS at 00:00

## 2021-07-22 RX ADMIN — SODIUM CHLORIDE: 9 INJECTION, SOLUTION INTRAVENOUS at 04:10

## 2021-07-22 RX ADMIN — POLYETHYLENE GLYCOL (3350) 17 G: 17 POWDER, FOR SOLUTION ORAL at 08:17

## 2021-07-22 RX ADMIN — PIPERACILLIN AND TAZOBACTAM 3375 MG: 3; .375 INJECTION, POWDER, FOR SOLUTION INTRAVENOUS at 08:41

## 2021-07-22 RX ADMIN — LISINOPRIL 10 MG: 10 TABLET ORAL at 08:17

## 2021-07-22 RX ADMIN — HYDROCODONE BITARTRATE AND ACETAMINOPHEN 1 TABLET: 5; 325 TABLET ORAL at 00:00

## 2021-07-22 RX ADMIN — NIFEDIPINE 30 MG: 30 TABLET, EXTENDED RELEASE ORAL at 08:17

## 2021-07-22 ASSESSMENT — PAIN DESCRIPTION - ONSET
ONSET: ON-GOING

## 2021-07-22 ASSESSMENT — PAIN DESCRIPTION - PROGRESSION
CLINICAL_PROGRESSION: GRADUALLY IMPROVING
CLINICAL_PROGRESSION: NOT CHANGED
CLINICAL_PROGRESSION: GRADUALLY IMPROVING

## 2021-07-22 ASSESSMENT — PAIN DESCRIPTION - ORIENTATION
ORIENTATION: MID
ORIENTATION: MID

## 2021-07-22 ASSESSMENT — PAIN DESCRIPTION - DESCRIPTORS
DESCRIPTORS: ACHING
DESCRIPTORS: ACHING;SORE
DESCRIPTORS: ACHING

## 2021-07-22 ASSESSMENT — PAIN SCALES - GENERAL
PAINLEVEL_OUTOF10: 0
PAINLEVEL_OUTOF10: 5
PAINLEVEL_OUTOF10: 3
PAINLEVEL_OUTOF10: 2

## 2021-07-22 ASSESSMENT — PAIN DESCRIPTION - LOCATION
LOCATION: ABDOMEN

## 2021-07-22 ASSESSMENT — PAIN DESCRIPTION - FREQUENCY
FREQUENCY: INTERMITTENT
FREQUENCY: CONTINUOUS
FREQUENCY: CONTINUOUS

## 2021-07-22 ASSESSMENT — PAIN DESCRIPTION - PAIN TYPE
TYPE: SURGICAL PAIN

## 2021-07-22 ASSESSMENT — PAIN - FUNCTIONAL ASSESSMENT: PAIN_FUNCTIONAL_ASSESSMENT: ACTIVITIES ARE NOT PREVENTED

## 2021-07-22 NOTE — OP NOTE
[address] [facility] [id number]                                OPERATIVE REPORT    PATIENT NAME: Anton Moya                 :        1970  MED REC NO:   [id number]                              ROOM:       0319  ACCOUNT NO:   [de-identified]                           ADMIT DATE: 2021  PROVIDER:     Jackie Mathur    DATE OF PROCEDURE:  2021    SURGEON:  Dr. Jackie Mathur. PRIMARY CARE PROVIDER:  Virginia Curran DO.    MEDICAL ATTENDING:  Dr. Anabel Hammond. PREOPERATIVE DIAGNOSES:  1. Cholecystitis with cholelithiasis. 2.  Gallstone pancreatitis. POSTOPERATIVE DIAGNOSES:  1. Cholecystitis with cholelithiasis. 2.  Gallstone pancreatitis. OPERATION PERFORMED:  Laparoscopic cholecystectomy, robotic assist.    ANESTHESIA:  General endotracheal tube. IV FLUIDS:  1 liter of crystalloid. ESTIMATED BLOOD LOSS:  Less than 20 mL. INTRAOPERATIVE FINDINGS:  As mentioned above, cystic duct and artery  clearly visualized, confirmed with intraoperative indocyanine green  fluorescence. Good postoperative hemostasis. No evidence of bile leak. INDICATIONS:  The patient is a pleasant 49-year-old white male who  presents through [facility] with right-sided abdominal pain for one day  prior to admission. Workup revealed cholecystitis with cholelithiasis  with mild elevation of lipase. He was admitted, placed at bowel rest,  and given supportive care. His labs normalized, symptoms improved. At  this time, cholecystectomy is indicated. DESCRIPTION OF PROCEDURE:  After obtaining informed consent with  discussion of risks, benefits, and alternatives including a risk of  bleeding, infection, bowel/bile duct injury, COVID-19  exposure/infection, etc., the patient was taken to the operating theater  and placed in the supine position on the operating table. He received  preoperative IV antibiotics and KEVIN sequentials. Following smooth  induction of general anesthesia, he was positioned, prepped and draped  in the standard fashion. An incision was carried down through the skin  and subcutaneous tissues just below the umbilicus. It was deepened  bluntly to the rectus fascia. Stay sutures of 0 Vicryl were placed in  the fascia with anterior traction along the stay sutures. A Veress  needle was passed perpendicularly into the abdomen. Two clicks were  appreciated. Saline test showed rapid flow in the abdomen. A  pneumoperitoneum was then established to 15 mmHg. The Veress needle was  removed and a 12-mm balloon Visiport was placed under laparoscopic  vision directly into the abdomen. The rectus sheath and peritoneum were  clearly visualized during the course of their dissection. Upon entry  into the abdomen, all visible bowel, colon, and omentum were normal in  appearance with no evidence of needle nor trocar injury. Two 8-mm ports  were placed in the right and left lateral abdominal wall, anterior  axillary line with a third 8-mm port in the right upper quadrant, all  under direct vision. The patient was placed in reverse Trendelenburg,  left side down. The da Fil[facility]erto surgical robot was appropriately docked. The gallbladder was grasped and retracted superiorly. It was found to  be mildly inflamed with omental adhesions to the undersurface of the  liver and gallbladder, which were taken down with gentle blunt  dissection and highly selective use of cautery. This allowed further  retraction of the gallbladder cephalad with the infundibulum taken  laterally. The cystic duct and artery were dissected from the  surrounding tissues. These two structures and only these two structures  were seen going directly to the gallbladder. They were clipped twice  along the patient's side, once along the gallbladder, and divided with  cautery.   The gallbladder was then removed from the undersurface of the  liver with selective use of cautery. Once freed, the gallbladder was  placed in an EndoCatch bag and retracted away. Final inspection of the  undersurface of the liver showed excellent hemostasis. Clips were in  place. No evidence of bile leak confirmed with intraoperative  indocyanine green fluorescence. The surgical robot was undocked and  retracted away. Each port was removed under direct vision again  assuring hemostasis. The gallbladder was removed through the umbilical  port site and passed off as specimen. The umbilical port site was  closed by reapproximation of the rectus fascia with 0 Vicryl in a  figure-of-eight fashion. Skin edges were reapproximated with skin  staples and covered with bacitracin and sterile dressings. All sponge,  needle, and instrument counts were correct at the end of the case. The  patient tolerated the procedure well and was transferred to PACU in  stable condition. SPECIMENS:  Gallbladder. DRAINS:  None. COMPLICATIONS:  None. DISPOSITION:  To PACU, extubated, awake, alert, and stable. Following  recovery, we will return the patient to the surgical floor with planned  discharge tomorrow with gradual advancement of diet and activity as  tolerated with instructions for no heavy lifting nor abdominal straining  for the next four weeks.   Followup will be with me in one to two weeks  for staple removal.        LUISA Betancourt    D: 07/21/2021 18:15:19       T: 07/21/2021 18:21:40     [email or web address]  Job#: [id number]     Doc#: [id number]    CC:  Rekha Liu

## 2021-07-22 NOTE — PROGRESS NOTES
Progress Note    SUBJECTIVE:    Patient seen for f/u of Pancreatitis, gallstone. He is currently sitting up in bed eating breakfast.  Patient states his pain is controlled. Denies nausea vomiting diarrhea. He is passing gas. States he feels like he is going to have a bowel movement. Stated he had a bowel movement yesterday prior to surgery. He is tolerating activity well. ROS:   Constitutional: negative  for fevers, and negative for chills.   Respiratory: negative for shortness of breath, negative for cough, and negative for wheezing  Cardiovascular: negative for chest pain, and negative for palpitations  Gastrointestinal: positive for abdominal pain, negative for nausea,negative for vomiting, negative for diarrhea, and negative for constipation     All other systems were reviewed with the patient and are negative unless otherwise stated in HPI      OBJECTIVE:      Vitals:   Vitals:    07/22/21 0800   BP: (!) 158/99   Pulse: 83   Resp: 16   Temp: 98.5 °F (36.9 °C)   SpO2: 96%     Weight: (!) 341 lb (154.7 kg)   Height: 6' 1\" (185.4 cm)   -----------------------------------------------------------------  Exam:    CONSTITUTIONAL:  awake, alert, cooperative, no apparent distress, and appears stated age  EYES:  Lids and lashes normal, pupils equal, round and reactive to light, extra ocular muscles intact, sclera clear, conjunctiva normal  ENT:  normocepalic, without obvious abnormality, atraumatic  NECK:  supple, symmetrical, trachea midline, skin normal and no stridor  HEMATOLOGIC/LYMPHATICS:  no cervical lymphadenopathy and no supraclavicular lymphadenopathy  LUNGS:  No increased work of breathing, good air exchange, clear to auscultation bilaterally, no crackles or wheezing  CARDIOVASCULAR:  Normal apical impulse, regular rate and rhythm, normal S1 and S2, no S3 or S4, and no murmur noted  ABDOMEN:  No scars, hyperactive bowel sounds, round, soft, non-distended, tender, no masses palpated, no hepatosplenomegally. 4 stab dressings minimal drainage   MUSCULOSKELETAL:  There is no redness, warmth, or swelling of the joints. Full range of motion noted. Motor strength is 5 out of 5 all extremities bilaterally. Tone is normal.  NEUROLOGIC:  Moves all extremities equally, follow commands, no defits  SKIN:  no bruising or bleeding, normal skin color, texture, turgor, no redness, warmth, or swelling, no rashes and no lesions  EXT:     no cyanosis, clubbing or edema present    -----------------------------------------------------------------    Diagnostic Data:    · All available data reviewed  Lab Results   Component Value Date    WBC 9.4 07/22/2021    HGB 13.6 07/22/2021    MCV 92.4 07/22/2021     07/22/2021      Lab Results   Component Value Date    GLUCOSE 139 (H) 07/22/2021    BUN 11 07/22/2021    CREATININE 0.66 (L) 07/22/2021     07/22/2021    K 4.1 07/22/2021    CALCIUM 8.8 07/22/2021     07/22/2021    CO2 23 07/22/2021       MRI ABDOMEN WO CONTRAST MRCP   Final Result   Cholelithiasis. No biliary ductal dilatation or retained common duct stones. Mild injection of the fat surrounding the pancreatic head, compatible with   pancreatitis. No pancreatic ductal dilatation         US GALLBLADDER RUQ   Final Result   Limited by patient body habitus. Multiple cholelithiasis and GB sludge with findings more suggestive of mild   chronic cholecystitis than acute; ultrasound tech, however, reports a   positive Martinez's sign raising the possibility of acute cholecystitis. See   recommendations below. No abnormal dilatation biliary tree. RECOMMENDATIONS:   If there is clinical concern for acute cholecystitis, consider HIDA scan. CT ABDOMEN PELVIS W IV CONTRAST Additional Contrast? None   Final Result   Head of the pancreas appears mildly enlarged with suggestion of mild   peripancreatic inflammatory changes which may also be associated with the   duodenum.   Finding may represent mild pancreatitis or duodenitis. Please   correlate with labs      Cholelithiasis      Mild hepatosplenomegaly         XR CHEST (2 VW)   Final Result   Mild cardiomegaly. Interstitial opacities compatible mild pulmonary edema or   interstitial pneumonia. ASSESSMENT / PLAN:  Pancreatitis, gallstone  · Continue current therapy  ? IV fluids  ? Trend lipase-initial 120, currently 10  ? Bilirubin initially 1.60, currently 1.32  ? AST/ALT remains normal  ? Pain control  ? Ultrasound gallbladder-multiple cholelithiasis and gallbladder sludge. Acute cholecystitis. No abnormal dilatation of biliary tree. ? MRCP-No obstruction  · Cholelithiasis  ? Appreciate general surgery  ? Ultrasound gallbladder-multiple cholelithiasis and gallbladder sludge. Acute cholecystitis. No abnormal dilatation of biliary tree. ? No HIDA scan per general surgery  ? Leukocytosis resolved  ? Continue Zosyn IV  ? Continue IV fluids  ? Continue general diet  ? Cholecystectomy yesterday  · Uncontrolled hypertension  ? Continue lisinopril 10 mg twice daily  ? Continue IV Vasotec 1.25 mg every 6 hours as needed systolic blood pressure greater than 150   ? Continue labetalol 10 mg IV every 6 hours as needed systolic blood pressure greater than 150  ? Continue Adalat cc ER 30 mg daily  ? EKG  ? Echocardiogram-normal  ? Lipid panel-normal  · Constipation  · Resolved  · BM 7/21/2021  · Continue MiraLAX daily  · Asymptomatic bacteriuria  · Culture positive for E.  Coli  · Continue Zosyn  · DVT prophylaxis: SCD  · Peptic ulcer prophylaxis: Pepcid  · High risk medications: none  · Disposition:  Discharge plan is home today      KERAA Eli - CNP , APRN, NP-C

## 2021-07-22 NOTE — DISCHARGE INSTR - DIET

## 2021-07-22 NOTE — DISCHARGE INSTR - ACTIVITY
Activity as tolerated, no heavy lifting, pushing or pulling until cleared by general surgeon     May not return to work until Cheryl Najjar clears you to do so

## 2021-07-22 NOTE — DISCHARGE SUMMARY
Discharge Summary    Chet Dobson  :  1970  MRN:  [id number]    Admit date:  2021      Discharge date: 2021     Admitting Physician:  Heather Hall MD    Discharge Diagnoses:    Principal Problem:    Pancreatitis, gallstone  Active Problems:    Hypertension    Asymptomatic bacteriuria    Slow transit constipation  Resolved Problems:    * No resolved hospital problems. *      Hospital Course:   Chet Dobson is a 46 y.o. male admitted with pancreatitis and cholelithiasis. He presented with pancreatitis and gallstones. He presented to the emergency department with his wife with complaints of right upper quadrant abdominal pain and epigastric pain with radiation to his back.  Patient stated pain began Saturday morning when he got off work. [place] that time he complained of it as a dull pain he states now the pain increased and waxed and waned with sharp sensation.    He stated the pain is just below his ribs.  Complained of chills but unsure if he had fever.  Complained of some nausea without vomiting.  Stated he has never had pain like this before.  Denied drugs.    Admitted to drinking alcohol daily.  Stated he has not had any alcohol since Saturday.  Patient stated he has a history of hypertension and initially during his evaluation his systolic pressure was 977. Patient was admitted and general surgery was consulted. Patient was placed on IV Zosyn. Patient underwent cholecystectomy yesterday and tolerated procedure well. Patient's pain is controlled he is passing gas as well as bowel movements. He is afebrile. Prior to surgery his blood pressure was controlled with medication management. He was started on Adalat CC ER 30 mg daily which improved his systolic blood pressures. Patient also completed an echocardiogram as well as EKG which were normal.  Patient has been counseled on pancreatitis and also alcohol monitoring as that can cause pancreatitis as well.   He has been counseled on weight loss and diet control for better blood pressure control. Patient will be discharged home today he will follow-up with Dr. Johan Retana and he will remain off work until cleared by general surgery. He will also follow-up with Dr. Pietro Macias for blood pressure management. Consultants:  Dr. Oc Jonas, gen surg    Procedures: Cholecystectomy    Complications: none    Discharge Condition: good    Exam:  CONSTITUTIONAL:  awake, alert, cooperative, no apparent distress, and appears stated age  EYES:  Lids and lashes normal, pupils equal, round and reactive to light, extra ocular muscles intact, sclera clear, conjunctiva normal  ENT:  normocepalic, without obvious abnormality, atraumatic  NECK:  supple, symmetrical, trachea midline, skin normal and no stridor  HEMATOLOGIC/LYMPHATICS:  no cervical lymphadenopathy and no supraclavicular lymphadenopathy  LUNGS:  No increased work of breathing, good air exchange, clear to auscultation bilaterally, no crackles or wheezing  CARDIOVASCULAR:  Normal apical impulse, regular rate and rhythm, normal S1 and S2, no S3 or S4, and no murmur noted  ABDOMEN:  No scars, hyperactive bowel sounds, round, soft, non-distended, tender, no masses palpated, no hepatosplenomegally. 4 stab dressings minimal drainage   MUSCULOSKELETAL:  There is no redness, warmth, or swelling of the joints. Full range of motion noted. Motor strength is 5 out of 5 all extremities bilaterally.   Tone is normal.  NEUROLOGIC:  Moves all extremities equally, follow commands, no defits  SKIN:  no bruising or bleeding, normal skin color, texture, turgor, no redness, warmth, or swelling, no rashes and no lesions  EXT:      no cyanosis, clubbing or edema present      Significant Diagnostic Studies:   Lab Results   Component Value Date    WBC 9.4 07/22/2021    HGB 13.6 07/22/2021     07/22/2021       Lab Results   Component Value Date    BUN 11 07/22/2021    CREATININE 0.66 (L) 07/22/2021     07/22/2021    K 4.1 07/22/2021    CALCIUM 8.8 07/22/2021     07/22/2021    CO2 23 07/22/2021    LABGLOM >60 07/22/2021       Lab Results   Component Value Date    WBCUA 2 TO 5 07/18/2021    RBCUA 0 TO 2 07/18/2021    EPITHUA 0 TO 2 07/18/2021    LEUKOCYTESUR NEGATIVE 07/18/2021    SPECGRAV 1.015 07/18/2021    GLUCOSEU NEGATIVE 07/18/2021    KETUA NEGATIVE 07/18/2021    PROTEINU 1+ (A) 07/18/2021    HGBUR NEGATIVE 07/18/2021    CASTUA NOT REPORTED 07/18/2021    CRYSTUA NOT REPORTED 07/18/2021    BACTERIA TRACE (A) 07/18/2021    YEAST NOT REPORTED 07/18/2021       Echocardiogram complete 2D with doppler with color    Result Date: 7/19/2021  [facility] Transthoracic Echocardiography Report (TTE)  Patient Name Shahla An    Date of Study               07/19/2021               Raheem Smithan   Date of      1970  Gender                      Male  Birth   Age          46 year(s)  Race                           Room Number  0319        Height:                     73 inch, 185.42 cm   Corporate ID [id number]    Weight:                     340 pounds, 154.2 kg  #   Patient Acct [de-identified]   BSA:          2.7 m^2       BMI:      44.86  #                                                              kg/m^2   MR #         [id number]      Sonographer                 Ne Melara   Accession #  [id number]  Interpreting Physician      Neville VillalobosShingleton David   Fellow                   Referring Nurse             Chito Elam, DEVONTE                           Practitioner   Interpreting             Referring Physician  Fellow  Type of Study   TTE procedure:2D Echocardiogram, M-Mode, Doppler, Color Doppler. Procedure Date Date: 07/19/2021 Start: 10:23 AM Study Location: [facility] Technical Quality: Good visualization Indications:Hypertension. History / Tech.  Comments: Dx: uncontrolled HTN Patient Status: Inpatient Height: 73 inches Weight: 340 pounds BSA: 2.7 m^2 BMI: 44.86 kg/m^2 Rhythm: Within normal limits BP: 178/108 mmHg CONCLUSIONS Summary Normal global left ventricular systolic function with an estimated ejection fraction of >55% Normal left ventricular size with moderate concentric left ventricular hypertrophy. No significant wall motion abnormalities were seen. No significant valvular disease was seen. Evidence of moderate (grade II) diastolic dysfunction is seen. No previous studies were available for comparison. Signature ----------------------------------------------------------------------------  Electronically signed by Donte Hansen(Interpreting physician) on  07/19/2021 12:13 PM ---------------------------------------------------------------------------- ----------------------------------------------------------------------------  Electronically signed by Ne Melara(Sonographer) on 07/19/2021 03:16 PM ---------------------------------------------------------------------------- FINDINGS Left Atrium The left atrium appears normal in size. Left Ventricle Normal global left ventricular systolic function with an estimated ejection fraction of >55% Normal left ventricular size with moderate concentric left ventricular hypertrophy. No significant wall motion abnormalities were seen. Right Atrium The right atrium appears normal in size. Right Ventricle Normal right ventricular size and function. Mitral Valve Normal mitral valve structure and function. Aortic Valve Normal aortic valve structure and function without stenosis or regurgitation. Tricuspid Valve Normal tricuspid valve structure and function. Pulmonic Valve The pulmonic valve was poorly seen, but appeared normal in structure and function. Pericardial Effusion No significant pericardial effusion is seen. Pleural Effusion No pleural effusion seen. Miscellaneous Normal aortic root dimensions. Evidence of moderate (grade II) diastolic dysfunction is seen.  M-mode / 2D Measurements & Calculations:   LVIDd:5.9 cm(3.7 - 5.6 cm)       Diastolic QNWVHM:194.2 ml LVIDs:4.65 cm(2.2 - 4.0 cm)      Systolic KHUYVX:34.7 ml  VFZQ:7.29 cm(0.6 - 1.1 cm)       Aortic Root:3.47 cm(2.0 - 3.7 cm)  LVPWd:1.39 cm(0.6 - 1.1 cm)      LA Dimension: 5.07 cm(1.9 - 4.0 cm)  Fractional Shortenin.19 %    LA volume/Index: 64.3 ml /24m^2  Calculated LVEF (%): 57.47 %     AV Cusp Separation: 2.53 cm   Mitral:                                 Aortic   Valve Area (P1/2-Time): 3.83 cm^2       Peak Velocity: 1.01 m/s  Peak E-Wave: 0.96 m/s                   Mean Velocity: 0.77 m/s  Peak A-Wave: 0.44 m/s                   Peak Gradient: 4.12 mmHg  E/A Ratio: 2.19                         Mean Gradient: 2.59 mmHg  Peak Gradient: 3.71 mmHg                                          Acceleration Time: 71.83 msec  P1/2t: 57.39 msec                                          AV VTI: 18.72 cm  Diastology / Tissue Doppler Lateral Wall E' velocity:0.06 m/s Lateral Wall E/E':15.35    XR CHEST (2 VW)    Result Date: 2021  EXAMINATION: TWO XRAY VIEWS OF THE CHEST 2021 6:59 pm COMPARISON: 2009 HISTORY: ORDERING SYSTEM PROVIDED HISTORY: epigastric pain TECHNOLOGIST PROVIDED HISTORY: epigastric pain FINDINGS: Heart size mildly enlarged. Interstitial opacities bilaterally. No focal consolidation, pleural effusion, or pneumothorax. Mild cardiomegaly. Interstitial opacities compatible mild pulmonary edema or interstitial pneumonia. CT ABDOMEN PELVIS W IV CONTRAST Additional Contrast? None    Result Date: 2021  EXAMINATION: CT OF THE ABDOMEN AND PELVIS WITH CONTRAST 2021 4:03 pm TECHNIQUE: CT of the abdomen and pelvis was performed with the administration of intravenous contrast. Multiplanar reformatted images are provided for review. Dose modulation, iterative reconstruction, and/or weight based adjustment of the mA/kV was utilized to reduce the radiation dose to as low as reasonably achievable. COMPARISON: None.  HISTORY: ORDERING SYSTEM PROVIDED HISTORY: ruq pain TECHNOLOGIST PROVIDED HISTORY: ruq pain Decision Support Exception - unselect if not a suspected or confirmed emergency medical condition->Emergency Medical Condition (MA) FINDINGS: Lower Chest: Lung bases are clear. Organs: Liver is enlarged in size with normal density. No focal masses identified. No evidence of intrahepatic ductal dilatation. Spleen is mildly enlarged. Punctate calcified granulomas in the spleen. .  The gallbladder contains multiple stones. No pericholecystic fluid. .  Both adrenal glands are normal.  Head of the pancreas appears mildly enlarged with suggestion of mild peripancreatic inflammatory changes which may also be associated with the duodenum. .  Pancreatic body and tail are unremarkable. . The kidneys are  normal in size and attenuation without evidence of hydronephrosis or renal calculi. GI/Bowel: The visualized bowel and mesentery show no mass lesions. Appendix is not visualized. Pelvis: No intrapelvic mass is identified. Bladder and rectum are intact. Peritoneum/Retroperitoneum: Left sided retroaortic renal vein. No free fluid. Small periaortic lymph nodes. No l suspicious ymphadenopathy. No evidence of pneumoperitoneum. Bones/Soft Tissues: Small fat containing umbilical hernia. Degenerative changes seen in the visualized spine. Mild superior endplate depression of L1 vertebral body. Head of the pancreas appears mildly enlarged with suggestion of mild peripancreatic inflammatory changes which may also be associated with the duodenum. Finding may represent mild pancreatitis or duodenitis.   Please correlate with labs Cholelithiasis Mild hepatosplenomegaly     US GALLBLADDER RUQ    Result Date: 7/19/2021  EXAMINATION: RIGHT UPPER QUADRANT ULTRASOUND 7/19/2021 6:48 am COMPARISON: CT scan of the abdomen and pelvis from 07/18/2021 HISTORY: ORDERING SYSTEM PROVIDED HISTORY: CHOLELITHIASIS WITH PANCREATITIS TECHNOLOGIST PROVIDED HISTORY: CHOLELITHIASIS WITH PANCREATITIS Persistent right upper quadrant epigastric pain with nausea vomiting. Hepatosplenomegaly. FINDINGS: Study is limited by large patient body habitus. LIVER: Visualized liver demonstrates unremarkable echogenicity without evidence of intrahepatic biliary ductal dilatation. BILIARY SYSTEM:  Gallbladder contains multiple shadowing layering echogenic stones; some layering sludge also suspected. GB wall appears hyperechoic and slightly thickened at 3.5 mm; no edema or pericholecystic fluid demonstrated; ultrasound tech reports a positive sonographic Martinez's sign. Common bile duct is within normal limits measuring 4.4 mm. RIGHT KIDNEY: Visualization of the right kidney is somewhat limited but grossly unremarkable without evidence of hydronephrosis. Right kidney measures 13.5 x 5.6 x 7.1 cm, with cortical thickness 1.8 cm PANCREAS:  Visualized portions of the pancreas are unremarkable. OTHER: No evidence of right upper quadrant ascites. Inferior vena cava patent. Limited by patient body habitus. Multiple cholelithiasis and GB sludge with findings more suggestive of mild chronic cholecystitis than acute; ultrasound tech, however, reports a positive Martinez's sign raising the possibility of acute cholecystitis. See recommendations below. No abnormal dilatation biliary tree. RECOMMENDATIONS: If there is clinical concern for acute cholecystitis, consider HIDA scan. MRI ABDOMEN WO CONTRAST MRCP    Result Date: 7/19/2021  EXAMINATION: MRI OF THE ABDOMEN WITHOUT CONTRAST AND MRCP 7/19/2021 4:29 pm TECHNIQUE: Multiplanar multisequence MRI of the abdomen was performed without the administration of intravenous contrast.  After initial T2 axial and coronal images, thick slab, thin slab and 3D coronal MRCP sequences were obtained without the administration of intravenous contrast.  MIP images are provided for review.  COMPARISON: None HISTORY: ORDERING SYSTEM PROVIDED HISTORY: cholelithiasis, pancreatitis TECHNOLOGIST PROVIDED HISTORY: cholelithiasis, pancreatitis FINDINGS: Gallbladder: Gallstones are seen. Gallbladder is incompletely distended. No pericholecystic fluid. Bile Ducts: No intra or extrahepatic biliary ductal dilatation. Extrahepatic common duct measures 5 mm. No focal filling defects seen in extrahepatic common duct. Subtle diffuse signal loss is seen throughout the liver on out of phase images, suggesting mild fatty infiltration. Pancreatic Duct: No pancreatic ductal dilatation noted. .  There is injection of the fat surrounding the pancreatic head neck region. Other:  Adrenal glands are unremarkable. No hydronephrosis on the right. No hydronephrosis on the left. T2 hyperintense focus projects posteriorly off the left kidney, measuring 11 mm, likely cyst.  There is borderline splenomegaly. Retroaortic left renal vein is seen. No retroperitoneal adenopathy. Tiny periumbilical hernia containing fat is seen No bowel obstruction noted. Spurring is seen in the spine     Cholelithiasis. No biliary ductal dilatation or retained common duct stones. Mild injection of the fat surrounding the pancreatic head, compatible with pancreatitis. No pancreatic ductal dilatation       Assessment and Plan:  Patient Active Problem List    Diagnosis Date Noted    Slow transit constipation 07/21/2021    Asymptomatic bacteriuria 07/20/2021    Hypertension     Pancreatitis, gallstone 07/18/2021        Discharge Medications:       Primo Sykes   [place] Medication Instructions SHN:[id number]    Printed on:07/22/21 1113   Medication Information                      HYDROcodone-acetaminophen (NORCO) 5-325 MG per tablet  Take 1 tablet by mouth every 6 hours as needed for Pain for up to 3 days. May take up to 2 tablets po every 6 hours as needed for pain.              lisinopril (PRINIVIL;ZESTRIL) 10 MG tablet  Take 10 mg by mouth 2 times daily             NIFEdipine (ADALAT CC) 30 MG extended release tablet  Take 1 tablet by mouth daily

## 2021-07-22 NOTE — PLAN OF CARE
Problem: Pain:  Goal: Pain level will decrease  Description: Pain level will decrease  7/21/2021 2129 by Reyes Robles RN  Outcome: Ongoing  Note: Pain assessed every 4 hours. Patient is able to communicate with staff the need for pain interventions. Patient currently 8/10.  7/21/2021 1347 by Rachna Pantoja RN  Outcome: Ongoing  Goal: Control of acute pain  Description: Control of acute pain  7/21/2021 2129 by Reyes Robles RN  Outcome: Ongoing  7/21/2021 1347 by Rachna Pantoja RN  Outcome: Ongoing  Goal: Control of chronic pain  Description: Control of chronic pain  7/21/2021 2129 by Reyes Robles RN  Outcome: Ongoing  7/21/2021 1347 by Rachna Pantoja RN  Outcome: Ongoing     Problem: Skin Integrity:  Goal: Skin integrity will be maintained  Description: Skin integrity will be maintained  7/21/2021 2129 by Reyes Robles RN  Outcome: Ongoing  Note: Skin assessment performed, x4 lap sites on abdomen.   7/21/2021 1347 by Rachna Pantoja RN  Outcome: Ongoing

## 2021-07-22 NOTE — PROGRESS NOTES
Discharge instructions given to pt and family. No questions, pt verbalized understanding all instructions. Pt aware of follow up appointments as listed on AVS. Pt d/c'd off unit at this time, ambulatory, with spouse, to home. Belongings in hand. No issues noted.

## 2021-07-23 LAB
CULTURE: NORMAL
Lab: NORMAL
SPECIMEN DESCRIPTION: NORMAL

## 2021-07-23 RX ORDER — POLYETHYLENE GLYCOL 3350 17 G/17G
17 POWDER, FOR SOLUTION ORAL DAILY
Qty: 30 EACH | Refills: 0 | Status: SHIPPED | OUTPATIENT
Start: 2021-07-23 | End: 2021-08-22

## 2021-07-23 RX ORDER — HYDROCODONE BITARTRATE AND ACETAMINOPHEN 5; 325 MG/1; MG/1
1 TABLET ORAL EVERY 6 HOURS PRN
Qty: 12 TABLET | Refills: 0 | Status: SHIPPED | OUTPATIENT
Start: 2021-07-23 | End: 2021-07-26

## 2021-07-23 RX ORDER — NIFEDIPINE 30 MG/1
30 TABLET, FILM COATED, EXTENDED RELEASE ORAL DAILY
Qty: 30 TABLET | Refills: 3 | Status: SHIPPED | OUTPATIENT
Start: 2021-07-23

## 2021-07-23 NOTE — DISCHARGE SUMMARY
7/23/2021    Patient called in prescriptions on discharge were sent to [facility] mail service. I reordered his medications and sent to local pharmacy. Those prescriptions included his Meaghan Baker as well as Adalat CC.     Romelia Germain, APRN - CNP

## 2021-07-26 LAB — SURGICAL PATHOLOGY REPORT: NORMAL

## 2021-07-27 ENCOUNTER — OFFICE VISIT (OUTPATIENT)
Dept: SURGERY | Age: 51
End: 2021-07-27
Payer: COMMERCIAL

## 2021-07-27 DIAGNOSIS — Z90.49 S/P LAPAROSCOPIC CHOLECYSTECTOMY: Primary | ICD-10-CM

## 2021-07-27 PROCEDURE — 99024 POSTOP FOLLOW-UP VISIT: CPT | Performed by: SURGERY

## 2021-07-27 NOTE — LETTER
[facility] GENERAL SURGERY Part of [place] [address]  Phone: [phone]  Fax: [phone]    Miranda Collins MD    August 6, 2021     John Andrade, [address] [place] [address]  [facility] [address] [facility]    Patient: Aurora Diallo   MR Number: [id number]   YOB: 1970   Date of Visit: 7/27/2021       Dear John Andrade: Thank you for referring Viola Sandhoff to me for evaluation/treatment. Below are the relevant portions of my assessment and plan of care. ASSESSMENT     Diagnosis Orders   1. S/P laparoscopic cholecystectomy         PLAN    Mr. Ene Pereira is doing well. Original pain prior to surgery has resolved. Continue gradual advancement of diet and activity as tolerated, with no heavy lifting nor abdominal straining for the next few weeks. Follow-up next week nursing visit staple removal.     If you have questions, please do not hesitate to call me. I look forward to following Kevan Overall along with you.     Sincerely,    MD Miranda Whyte MD

## 2021-08-06 ENCOUNTER — NURSE ONLY (OUTPATIENT)
Dept: SURGERY | Age: 51
End: 2021-08-06
Payer: COMMERCIAL

## 2021-08-06 DIAGNOSIS — Z90.49 S/P LAPAROSCOPIC CHOLECYSTECTOMY: Primary | ICD-10-CM

## 2021-08-06 PROCEDURE — 99024 POSTOP FOLLOW-UP VISIT: CPT | Performed by: SURGERY

## 2021-08-07 NOTE — PROGRESS NOTES
Rabia Oquendo MD  General Surgery, Endoscopy  Chief Medical Officer    [facility] Amrita Knight  [address] [facility] [address]  Dept: [phone]  Fax: 55 Samantha Pearce  Chief Complaint   Patient presents with    Post-Op Check     s/p cholecystectomy 7/21. POD 6. patient without complaints       HPI    Mr. Izzy Sheth returns for follow-up after cholecystectomy. DATE OF PROCEDURE:  07/21/2021     SURGEON:  Dr. Rekha Liu.     PRIMARY CARE PROVIDER:  Cecy Carpenter DO.     MEDICAL ATTENDING:  Dr. Rabia Pinto.  Charlies Rubén:  1. Cholecystitis with cholelithiasis. 2.  Gallstone pancreatitis.     POSTOPERATIVE DIAGNOSES:  1. Cholecystitis with cholelithiasis. 2.  Gallstone pancreatitis.     OPERATION PERFORMED:  Laparoscopic cholecystectomy, robotic assist    He is doing well. Tolerating diet. Daily bowel movements. No fevers nor chills. Original pain prior to surgery has been resolved. Review of Systems    Past Medical History:   Diagnosis Date    Hypertension     Pancreatitis 07/18/2021       Past Surgical History:   Procedure Laterality Date    CHOLECYSTECTOMY  07/21/2021    Dr. Swati Fischer - laparoscopic, robotic    CHOLECYSTECTOMY, LAPAROSCOPIC N/A 7/21/2021    CHOLECYSTECTOMY LAPAROSCOPIC ROBOTIC performed by Rabia Oquendo MD at [address]       No family history on file. Allergies:  See list    Current Outpatient Medications   Medication Sig Dispense Refill    NIFEdipine (ADALAT CC) 30 MG extended release tablet Take 1 tablet by mouth daily 30 tablet 3    polyethylene glycol (GLYCOLAX) 17 g packet Take 17 g by mouth daily 30 each 0    lisinopril (PRINIVIL;ZESTRIL) 10 MG tablet Take 10 mg by mouth 2 times daily      terbinafine (LAMISIL) 250 MG tablet Take 250 mg by mouth daily       No current facility-administered medications for this visit.        Social History     Socioeconomic History    Marital status:      Spouse name: John Bowman Number of children: 3    Years of education: Not on file    Highest education level: Not on file   Occupational History    Occupation: maintnence     Employer: [employer]   Tobacco Use    Smoking status: Never Smoker    Smokeless tobacco: Never Used   Vaping Use    Vaping Use: Never used   Substance and Sexual Activity    Alcohol use: Yes     Alcohol/week: 2.0 standard drinks     Types: 2 Shots of liquor per week     Comment: 4 days a week    Drug use: Never    Sexual activity: Yes   Other Topics Concern    Not on file   Social History Narrative    Not on file     Social Determinants of Health     Financial Resource Strain:     Difficulty of Paying Living Expenses:    Food Insecurity:     Worried About Running Out of Food in the Last Year:     [address] in the Last Year:    Transportation Needs:     Lack of Transportation (Medical):  Lack of Transportation (Non-Medical):    Physical Activity:     Days of Exercise per Week:     Minutes of Exercise per Session:    Stress:     Feeling of Stress :    Social Connections:     Frequency of Communication with Friends and Family:     Frequency of Social Gatherings with Friends and Family:     Attends [employer] Services:     Active Member of Clubs or Organizations:     Attends Club or Organization Meetings:     Marital Status:    Intimate Partner Violence:     Fear of Current or Ex-Partner:     Emotionally Abused:     Physically Abused:     Sexually Abused: There were no vitals taken for this visit. Physical Exam  Vitals and nursing note reviewed. Constitutional:       General: He is not in acute distress. Appearance: He is well-developed. HENT:      Head: Normocephalic and atraumatic. Eyes:      General: No scleral icterus. Conjunctiva/sclera: Conjunctivae normal.      Pupils: Pupils are equal, round, and reactive to light. Neck:      Trachea: No tracheal deviation.    Cardiovascular:      Rate and Rhythm: Normal rate.   Pulmonary:      Effort: Pulmonary effort is normal. No respiratory distress. Abdominal:      Palpations: Abdomen is soft. Tenderness: There is no abdominal tenderness. There is no guarding. Hernia: No hernia is present. Comments: Wounds are clean, dry, intact. No erythema. Skin:     General: Skin is warm and dry. Neurological:      Mental Status: He is alert and oriented to person, place, and time. Psychiatric:         Behavior: Behavior normal.         Thought Content: Thought content normal.         Judgment: Judgment normal.       IMAGING/LABS    7/26/2021  8:50 AM - Silviano, Erika Incoming Lab Results From [facility]    Component Collected Lab   Surgical Pathology Report 07/21/2021  8:44  Dimas    -- Diagnosis --   GALLBLADDER:  CHOLELITHIASIS AND CHRONIC CHOLECYSTITIS. KAITLIN Guo   **Electronically Signed Out**         justina/7/26/2021         Clinical Information   Pre-op Diagnosis:  PANCREATITIS, GALLSTONES   Operative Findings:  GALLBLADDER   Operation Performed:  LAPAROSCOPIC CHOLECYSTECTOMY     Source of Specimen   1: GALLBLADDER     Gross Description   \"ERIKA BRICKNER, GALLBLADDER\" 9.0 x 5.9 x 5.8 cm focally disrupted   gallbladder with a 1.0 cm long x 0.6 cm in diameter cystic duct.  The   serosa is green-gray, and the liver bed is coarse brown-green.  The   wall is 0.1 cm in thickness, and the lumen contains approximately 30   cc of thick tenacious green bile with faceted yellow-brown calculi,   4.0 x 2.0 x 1.5 cm in aggregate.  The mucosa is dark green and   velvety.  Cystic duct margin and sections of gallbladder mucosa 1cs. tm       Microscopic Description   Microscopic examination performed. SURGICAL PATHOLOGY CONSULTATION         Patient Name: Hayley [place]    [facility] Rec: [id number]   Path Number: [id number]     [address] [facility]   ANATOMIC PATHOLOGY   [address] [place] [phone]   Fax: [phone]    Testing Performed By    Aretha Estrada Name Director Address Valid Date Range   [address] [address] 08/30/17 0801-Present   Lab and Collection    Surgical Pathology - 7/23/2021  Result Information    Status: Final result (Collected: 7/21/2021 08:44) Provider Status: Reviewed   All Reviewers List    Liss Rice MD on 7/26/2021 11:00   Routing History    Priority Sent On From To Message Type    7/26/2021  8:50 AM Silviano, Mhpn Incoming Lab Results From Lucian Pinedo MD Results    7/22/2021 10:49 PM Silviano, Mhpn Incoming Lab Results From [address] [place] [address] - [facility] Results    7/20/2021  6:06 AM Silviano, Mhpn Incoming Lab Results From Brenden Li MD CC'd Results   Click to Print Result   View [facility]    Surgical Pathology (Order #[id number]) on 7/23/21       ASSESSMENT     Diagnosis Orders   1. S/P laparoscopic cholecystectomy         PLAN    Mr. Dg Lutz is doing well. Original pain prior to surgery has resolved. Continue gradual advancement of diet and activity as tolerated, with no heavy lifting nor abdominal straining for the next few weeks.   Follow-up next week nursing visit staple removal.     Liss Rice MD

## 2021-08-07 NOTE — PROGRESS NOTES
Cl Ferreira MD  General Surgery, Endoscopy  Chief Medical Officer    [facility] [place] Paulette Fine  [address] [address]  Dept: [phone]  Fax: [phone]    CHIEF COMPLAINT  No chief complaint on file. HPI    Mr. Ramiro Stephens returns for follow-up after cholecystectomy.     DATE OF PROCEDURE:  07/21/2021     SURGEON:  Dr. Darlene Wisdom.     PRIMARY CARE PROVIDER: Concha Galloway DO.     MEDICAL ATTENDING:  Dr. Chase Kovacs.     PREOPERATIVE DIAGNOSES:  1.  Cholecystitis with cholelithiasis. 2.  Gallstone pancreatitis.     POSTOPERATIVE DIAGNOSES:  1.  Cholecystitis with cholelithiasis. 2.  Gallstone pancreatitis.     OPERATION PERFORMED:  Laparoscopic cholecystectomy, robotic assist     He is doing well. Tolerating diet. Daily bowel movements. No fevers nor chills. Original pain prior to surgery has been resolved. Review of Systems    Past Medical History:   Diagnosis Date    Hypertension     Pancreatitis 07/18/2021       Past Surgical History:   Procedure Laterality Date    CHOLECYSTECTOMY  07/21/2021    Dr. Bobo Mccormack - laparoscopic, robotic    CHOLECYSTECTOMY, LAPAROSCOPIC N/A 7/21/2021    CHOLECYSTECTOMY LAPAROSCOPIC ROBOTIC performed by Cl Ferreira MD at [address]       No family history on file. Allergies:  See list    Current Outpatient Medications   Medication Sig Dispense Refill    NIFEdipine (ADALAT CC) 30 MG extended release tablet Take 1 tablet by mouth daily 30 tablet 3    polyethylene glycol (GLYCOLAX) 17 g packet Take 17 g by mouth daily 30 each 0    lisinopril (PRINIVIL;ZESTRIL) 10 MG tablet Take 10 mg by mouth 2 times daily      terbinafine (LAMISIL) 250 MG tablet Take 250 mg by mouth daily       No current facility-administered medications for this visit.        Social History     Socioeconomic History    Marital status:      Spouse name: Antonio Neal Number of children: 3    Years of education: Not on file    Highest education level: Not on file   Occupational History    Occupation: [employer]     Employer: [employer]   Tobacco Use    Smoking status: Never Smoker    Smokeless tobacco: Never Used   Vaping Use    Vaping Use: Never used   Substance and Sexual Activity    Alcohol use: Yes     Alcohol/week: 2.0 standard drinks     Types: 2 Shots of liquor per week     Comment: 4 days a week    Drug use: Never    Sexual activity: Yes   Other Topics Concern    Not on file   Social History Narrative    Not on file     Social Determinants of Health     Financial Resource Strain:     Difficulty of Paying Living Expenses:    Food Insecurity:     Worried About Running Out of Food in the Last Year:     [address] in the Last Year:    Transportation Needs:     Lack of Transportation (Medical):  Lack of Transportation (Non-Medical):    Physical Activity:     Days of Exercise per Week:     Minutes of Exercise per Session:    Stress:     Feeling of Stress :    Social Connections:     Frequency of Communication with Friends and Family:     Frequency of Social Gatherings with Friends and Family:     Attends Rastafari Services:     Active Member of Clubs or Organizations:     Attends Club or Organization Meetings:     Marital Status:    Intimate Partner Violence:     Fear of Current or Ex-Partner:     Emotionally Abused:     Physically Abused:     Sexually Abused: There were no vitals taken for this visit. Physical Exam     Nurse visit, staple removal.    IMAGING/LABS       7/26/2021  8:50 AM - Erika Shore Incoming Lab Results From [facility]     Component Collected Lab   Surgical Pathology Report 07/21/2021  8:44 AM [facility]   -- Diagnosis --   GALLBLADDER:  CHOLELITHIASIS AND CHRONIC CHOLECYSTITIS.        KAITLIN Escobar   **Electronically Signed Out**         justina/7/26/2021         Clinical Information   Pre-op Diagnosis:  PANCREATITIS, GALLSTONES   Operative Findings:  [id number]   Operation Performed:  LAPAROSCOPIC CHOLECYSTECTOMY     Source of Specimen   1: GALLBLADDER     Gross Description   \"ERIKA QUEZADA, GALLBLADDER\" 9.0 x 5.9 x 5.8 cm focally disrupted   gallbladder with a 1.0 cm long x 0.6 cm in diameter cystic duct.  The   serosa is green-gray, and the liver bed is coarse brown-green.  The   wall is 0.1 cm in thickness, and the lumen contains approximately 30   cc of thick tenacious green bile with faceted yellow-brown calculi,   4.0 x 2.0 x 1.5 cm in aggregate.  The mucosa is dark green and   velvety.  Cystic duct margin and sections of gallbladder mucosa 1cs. tm       Microscopic Description   Microscopic examination performed. SURGICAL PATHOLOGY CONSULTATION         Patient Name: [facility] Rec: [id number]   Path Number: [id number]     [address] [facility]   ANATOMIC PATHOLOGY   [address] [place] [address],  [address]. [place], [address]   [phone]   Fax: [phone]    Testing Performed By  [address] [place] [address] Name Director Address Valid Date Range   [address], [address] [address] 08/30/17 0801-Present   Lab and Collection     Surgical Pathology - 7/23/2021  Result Information     Status: Final result (Collected: 7/21/2021 08:44) Provider Status: Reviewed   All Reviewers List     Skye Mckinney MD on 7/26/2021 11:00   Routing History     Priority Sent On From To Message Type     7/26/2021  8:50 AM Silviano, M[facility]pn Incoming Lab Results From Kamryn Cleaning MD Results     7/22/2021 10:49 PM Silviano, Mhpn Incoming Lab Results From [address] [place], [place] [address]N - [facility] Results     7/20/2021  6:06 AM Silviano, Mhpn Incoming Lab Results From Jitendra Seay MD CC'd Results   Click to Print Result   View [facility]     Surgical Pathology (Order #[id number]) on 7/23/21         ASSESSMENT     Diagnosis Orders   1. S/P laparoscopic cholecystectomy         PLAN    Nurse visit. Staple removal.  No new complaints.      Samara Whitaker MD

## 2021-09-24 ENCOUNTER — HOSPITAL ENCOUNTER (INPATIENT)
Age: 51
LOS: 2 days | Discharge: HOME | DRG: 177 | End: 2021-09-26
Attending: FAMILY MEDICINE | Admitting: FAMILY MEDICINE
Payer: COMMERCIAL

## 2021-09-24 ENCOUNTER — APPOINTMENT (OUTPATIENT)
Dept: GENERAL RADIOLOGY | Age: 51
DRG: 177 | End: 2021-09-24
Payer: COMMERCIAL

## 2021-09-24 ENCOUNTER — APPOINTMENT (OUTPATIENT)
Dept: CT IMAGING | Age: 51
DRG: 177 | End: 2021-09-24
Payer: COMMERCIAL

## 2021-09-24 ENCOUNTER — APPOINTMENT (OUTPATIENT)
Dept: VASCULAR LAB | Age: 51
DRG: 177 | End: 2021-09-24
Payer: COMMERCIAL

## 2021-09-24 DIAGNOSIS — U07.1 PULMONARY EMBOLISM ASSOCIATED WITH COVID-19 (HCC): ICD-10-CM

## 2021-09-24 DIAGNOSIS — R06.09 DYSPNEA ON EXERTION: Primary | ICD-10-CM

## 2021-09-24 DIAGNOSIS — I26.99 PULMONARY EMBOLISM ASSOCIATED WITH COVID-19 (HCC): ICD-10-CM

## 2021-09-24 PROBLEM — I82.401 DEEP VEIN THROMBOSIS OF RIGHT LOWER EXTREMITY (HCC): Status: ACTIVE | Noted: 2021-09-24

## 2021-09-24 LAB
ABSOLUTE EOS #: <0.03 K/UL (ref 0–0.44)
ABSOLUTE IMMATURE GRANULOCYTE: <0.03 K/UL (ref 0–0.3)
ABSOLUTE LYMPH #: 0.71 K/UL (ref 1.1–3.7)
ABSOLUTE MONO #: 1.05 K/UL (ref 0.1–1.2)
ANION GAP SERPL CALCULATED.3IONS-SCNC: 14 MMOL/L (ref 9–17)
BASOPHILS # BLD: 0 % (ref 0–2)
BASOPHILS ABSOLUTE: <0.03 K/UL (ref 0–0.2)
BNP INTERPRETATION: ABNORMAL
BUN BLDV-MCNC: 13 MG/DL (ref 6–20)
BUN/CREAT BLD: 19 (ref 9–20)
CALCIUM SERPL-MCNC: 8.7 MG/DL (ref 8.6–10.4)
CHLORIDE BLD-SCNC: 98 MMOL/L (ref 98–107)
CO2: 22 MMOL/L (ref 20–31)
CREAT SERPL-MCNC: 0.69 MG/DL (ref 0.7–1.2)
D-DIMER QUANTITATIVE: 4.1 MG/L FEU (ref 0–0.59)
DIFFERENTIAL TYPE: ABNORMAL
EKG ATRIAL RATE: 100 BPM
EKG P AXIS: 90 DEGREES
EKG P-R INTERVAL: 190 MS
EKG Q-T INTERVAL: 386 MS
EKG QRS DURATION: 110 MS
EKG QTC CALCULATION (BAZETT): 497 MS
EKG R AXIS: -38 DEGREES
EKG T AXIS: 84 DEGREES
EKG VENTRICULAR RATE: 100 BPM
EOSINOPHILS RELATIVE PERCENT: 0 % (ref 1–4)
GFR AFRICAN AMERICAN: >60 ML/MIN
GFR NON-AFRICAN AMERICAN: >60 ML/MIN
GFR SERPL CREATININE-BSD FRML MDRD: ABNORMAL ML/MIN/{1.73_M2}
GFR SERPL CREATININE-BSD FRML MDRD: ABNORMAL ML/MIN/{1.73_M2}
GLUCOSE BLD-MCNC: 113 MG/DL (ref 70–99)
HCT VFR BLD CALC: 41 % (ref 40.7–50.3)
HEMOGLOBIN: 14 G/DL (ref 13–17)
IMMATURE GRANULOCYTES: 0 %
LYMPHOCYTES # BLD: 9 % (ref 24–43)
MCH RBC QN AUTO: 31.2 PG (ref 25.2–33.5)
MCHC RBC AUTO-ENTMCNC: 34.1 G/DL (ref 28.4–34.8)
MCV RBC AUTO: 91.3 FL (ref 82.6–102.9)
MONOCYTES # BLD: 13 % (ref 3–12)
NRBC AUTOMATED: 0 PER 100 WBC
PDW BLD-RTO: 12.8 % (ref 11.8–14.4)
PLATELET # BLD: 204 K/UL (ref 138–453)
PLATELET ESTIMATE: ABNORMAL
PMV BLD AUTO: 9.6 FL (ref 8.1–13.5)
POTASSIUM SERPL-SCNC: 3.6 MMOL/L (ref 3.7–5.3)
PRO-BNP: 448 PG/ML
RBC # BLD: 4.49 M/UL (ref 4.21–5.77)
RBC # BLD: ABNORMAL 10*6/UL
SEG NEUTROPHILS: 78 % (ref 36–65)
SEGMENTED NEUTROPHILS ABSOLUTE COUNT: 6.55 K/UL (ref 1.5–8.1)
SODIUM BLD-SCNC: 134 MMOL/L (ref 135–144)
TROPONIN INTERP: NORMAL
TROPONIN T: NORMAL NG/ML
TROPONIN, HIGH SENSITIVITY: 17 NG/L (ref 0–22)
WBC # BLD: 8.4 K/UL (ref 3.5–11.3)
WBC # BLD: ABNORMAL 10*3/UL

## 2021-09-24 PROCEDURE — 93005 ELECTROCARDIOGRAM TRACING: CPT | Performed by: PHYSICIAN ASSISTANT

## 2021-09-24 PROCEDURE — 84484 ASSAY OF TROPONIN QUANT: CPT

## 2021-09-24 PROCEDURE — 94761 N-INVAS EAR/PLS OXIMETRY MLT: CPT

## 2021-09-24 PROCEDURE — 94669 MECHANICAL CHEST WALL OSCILL: CPT

## 2021-09-24 PROCEDURE — 80048 BASIC METABOLIC PNL TOTAL CA: CPT

## 2021-09-24 PROCEDURE — 85379 FIBRIN DEGRADATION QUANT: CPT

## 2021-09-24 PROCEDURE — 6360000002 HC RX W HCPCS: Performed by: PHYSICIAN ASSISTANT

## 2021-09-24 PROCEDURE — 6360000002 HC RX W HCPCS: Performed by: NURSE PRACTITIONER

## 2021-09-24 PROCEDURE — 71045 X-RAY EXAM CHEST 1 VIEW: CPT

## 2021-09-24 PROCEDURE — 99283 EMERGENCY DEPT VISIT LOW MDM: CPT

## 2021-09-24 PROCEDURE — 2580000003 HC RX 258: Performed by: PHYSICIAN ASSISTANT

## 2021-09-24 PROCEDURE — 71260 CT THORAX DX C+: CPT

## 2021-09-24 PROCEDURE — 94640 AIRWAY INHALATION TREATMENT: CPT

## 2021-09-24 PROCEDURE — 83880 ASSAY OF NATRIURETIC PEPTIDE: CPT

## 2021-09-24 PROCEDURE — 93970 EXTREMITY STUDY: CPT

## 2021-09-24 PROCEDURE — 6370000000 HC RX 637 (ALT 250 FOR IP): Performed by: NURSE PRACTITIONER

## 2021-09-24 PROCEDURE — 2580000003 HC RX 258: Performed by: NURSE PRACTITIONER

## 2021-09-24 PROCEDURE — 1200000000 HC SEMI PRIVATE

## 2021-09-24 PROCEDURE — 96374 THER/PROPH/DIAG INJ IV PUSH: CPT

## 2021-09-24 PROCEDURE — 6370000000 HC RX 637 (ALT 250 FOR IP): Performed by: PHYSICIAN ASSISTANT

## 2021-09-24 PROCEDURE — 36415 COLL VENOUS BLD VENIPUNCTURE: CPT

## 2021-09-24 PROCEDURE — 99254 IP/OBS CNSLTJ NEW/EST MOD 60: CPT | Performed by: INTERNAL MEDICINE

## 2021-09-24 PROCEDURE — 85025 COMPLETE CBC W/AUTO DIFF WBC: CPT

## 2021-09-24 PROCEDURE — 93010 ELECTROCARDIOGRAM REPORT: CPT | Performed by: INTERNAL MEDICINE

## 2021-09-24 PROCEDURE — 94664 DEMO&/EVAL PT USE INHALER: CPT

## 2021-09-24 PROCEDURE — 6360000004 HC RX CONTRAST MEDICATION: Performed by: PHYSICIAN ASSISTANT

## 2021-09-24 RX ORDER — NIFEDIPINE 30 MG/1
30 TABLET, FILM COATED, EXTENDED RELEASE ORAL DAILY
Status: DISCONTINUED | OUTPATIENT
Start: 2021-09-24 | End: 2021-09-26 | Stop reason: HOSPADM

## 2021-09-24 RX ORDER — ASCORBIC ACID 500 MG
1000 TABLET ORAL 4 TIMES DAILY
Status: DISCONTINUED | OUTPATIENT
Start: 2021-09-24 | End: 2021-09-26 | Stop reason: HOSPADM

## 2021-09-24 RX ORDER — ACETAMINOPHEN 650 MG/1
650 SUPPOSITORY RECTAL EVERY 6 HOURS PRN
Status: DISCONTINUED | OUTPATIENT
Start: 2021-09-24 | End: 2021-09-26 | Stop reason: HOSPADM

## 2021-09-24 RX ORDER — ERGOCALCIFEROL 1.25 MG/1
50000 CAPSULE ORAL WEEKLY
Status: DISCONTINUED | OUTPATIENT
Start: 2021-09-24 | End: 2021-09-26 | Stop reason: HOSPADM

## 2021-09-24 RX ORDER — LISINOPRIL 10 MG/1
10 TABLET ORAL 2 TIMES DAILY
Status: DISCONTINUED | OUTPATIENT
Start: 2021-09-24 | End: 2021-09-26 | Stop reason: HOSPADM

## 2021-09-24 RX ORDER — ONDANSETRON 2 MG/ML
4 INJECTION INTRAMUSCULAR; INTRAVENOUS EVERY 6 HOURS PRN
Status: DISCONTINUED | OUTPATIENT
Start: 2021-09-24 | End: 2021-09-26 | Stop reason: HOSPADM

## 2021-09-24 RX ORDER — SODIUM CHLORIDE 0.9 % (FLUSH) 0.9 %
10 SYRINGE (ML) INJECTION EVERY 12 HOURS SCHEDULED
Status: DISCONTINUED | OUTPATIENT
Start: 2021-09-24 | End: 2021-09-26 | Stop reason: HOSPADM

## 2021-09-24 RX ORDER — FAMOTIDINE 20 MG/1
20 TABLET, FILM COATED ORAL 2 TIMES DAILY
Status: DISCONTINUED | OUTPATIENT
Start: 2021-09-24 | End: 2021-09-26 | Stop reason: HOSPADM

## 2021-09-24 RX ORDER — SODIUM CHLORIDE 9 MG/ML
1000 INJECTION, SOLUTION INTRAVENOUS CONTINUOUS
Status: DISCONTINUED | OUTPATIENT
Start: 2021-09-24 | End: 2021-09-26 | Stop reason: HOSPADM

## 2021-09-24 RX ORDER — DEXAMETHASONE SODIUM PHOSPHATE 10 MG/ML
10 INJECTION INTRAMUSCULAR; INTRAVENOUS ONCE
Status: COMPLETED | OUTPATIENT
Start: 2021-09-24 | End: 2021-09-24

## 2021-09-24 RX ORDER — POLYETHYLENE GLYCOL 3350 17 G/17G
17 POWDER, FOR SOLUTION ORAL DAILY PRN
Status: DISCONTINUED | OUTPATIENT
Start: 2021-09-24 | End: 2021-09-26 | Stop reason: HOSPADM

## 2021-09-24 RX ORDER — ACETAMINOPHEN 325 MG/1
650 TABLET ORAL EVERY 6 HOURS PRN
Status: DISCONTINUED | OUTPATIENT
Start: 2021-09-24 | End: 2021-09-26 | Stop reason: HOSPADM

## 2021-09-24 RX ORDER — ONDANSETRON 4 MG/1
4 TABLET, ORALLY DISINTEGRATING ORAL EVERY 8 HOURS PRN
Status: DISCONTINUED | OUTPATIENT
Start: 2021-09-24 | End: 2021-09-26 | Stop reason: HOSPADM

## 2021-09-24 RX ORDER — SODIUM CHLORIDE 9 MG/ML
25 INJECTION, SOLUTION INTRAVENOUS PRN
Status: DISCONTINUED | OUTPATIENT
Start: 2021-09-24 | End: 2021-09-26 | Stop reason: HOSPADM

## 2021-09-24 RX ORDER — TERBINAFINE HYDROCHLORIDE 250 MG/1
250 TABLET ORAL DAILY
Status: DISCONTINUED | OUTPATIENT
Start: 2021-09-24 | End: 2021-09-26 | Stop reason: HOSPADM

## 2021-09-24 RX ORDER — SODIUM CHLORIDE 0.9 % (FLUSH) 0.9 %
10 SYRINGE (ML) INJECTION PRN
Status: DISCONTINUED | OUTPATIENT
Start: 2021-09-24 | End: 2021-09-26 | Stop reason: HOSPADM

## 2021-09-24 RX ORDER — ZINC SULFATE 50(220)MG
100 CAPSULE ORAL DAILY
Status: DISCONTINUED | OUTPATIENT
Start: 2021-09-24 | End: 2021-09-26 | Stop reason: HOSPADM

## 2021-09-24 RX ORDER — IPRATROPIUM BROMIDE AND ALBUTEROL SULFATE 2.5; .5 MG/3ML; MG/3ML
1 SOLUTION RESPIRATORY (INHALATION) ONCE
Status: COMPLETED | OUTPATIENT
Start: 2021-09-24 | End: 2021-09-24

## 2021-09-24 RX ORDER — MULTIVIT-MIN/IRON/FOLIC ACID/K 18-600-40
CAPSULE ORAL
COMMUNITY

## 2021-09-24 RX ADMIN — IOPAMIDOL 75 ML: 755 INJECTION, SOLUTION INTRAVENOUS at 14:01

## 2021-09-24 RX ADMIN — ENOXAPARIN SODIUM 150 MG: 150 INJECTION SUBCUTANEOUS at 16:02

## 2021-09-24 RX ADMIN — ERGOCALCIFEROL 50000 UNITS: 1.25 CAPSULE ORAL at 17:32

## 2021-09-24 RX ADMIN — OXYCODONE HYDROCHLORIDE AND ACETAMINOPHEN 1000 MG: 500 TABLET ORAL at 20:34

## 2021-09-24 RX ADMIN — ENOXAPARIN SODIUM 150 MG: 150 INJECTION SUBCUTANEOUS at 20:33

## 2021-09-24 RX ADMIN — Medication 100 MG: at 17:32

## 2021-09-24 RX ADMIN — FAMOTIDINE 20 MG: 20 TABLET, FILM COATED ORAL at 20:34

## 2021-09-24 RX ADMIN — SODIUM CHLORIDE 1000 ML: 9 INJECTION, SOLUTION INTRAVENOUS at 14:20

## 2021-09-24 RX ADMIN — OXYCODONE HYDROCHLORIDE AND ACETAMINOPHEN 1000 MG: 500 TABLET ORAL at 17:32

## 2021-09-24 RX ADMIN — LISINOPRIL 10 MG: 10 TABLET ORAL at 20:34

## 2021-09-24 RX ADMIN — IPRATROPIUM BROMIDE AND ALBUTEROL SULFATE 1 AMPULE: .5; 3 SOLUTION RESPIRATORY (INHALATION) at 14:44

## 2021-09-24 RX ADMIN — DEXAMETHASONE SODIUM PHOSPHATE 10 MG: 10 INJECTION INTRAMUSCULAR; INTRAVENOUS at 13:36

## 2021-09-24 RX ADMIN — SODIUM CHLORIDE, PRESERVATIVE FREE 10 ML: 5 INJECTION INTRAVENOUS at 20:35

## 2021-09-24 ASSESSMENT — PAIN DESCRIPTION - DESCRIPTORS: DESCRIPTORS: ACHING

## 2021-09-24 ASSESSMENT — ENCOUNTER SYMPTOMS
COUGH: 1
SHORTNESS OF BREATH: 1
SPUTUM PRODUCTION: 0

## 2021-09-24 ASSESSMENT — PAIN DESCRIPTION - PAIN TYPE: TYPE: ACUTE PAIN

## 2021-09-24 ASSESSMENT — PAIN DESCRIPTION - LOCATION: LOCATION: GENERALIZED

## 2021-09-24 ASSESSMENT — PAIN SCALES - GENERAL
PAINLEVEL_OUTOF10: 0
PAINLEVEL_OUTOF10: 0
PAINLEVEL_OUTOF10: 6

## 2021-09-24 NOTE — CONSULTS
[facility] - Initial Consult Note COVID 19 Patient  Today's Date and Time: 9/25/2021, 3:38 PM    Impression :   · COVID 19 Suspect  · COVID 19 Confirmed Infection  · Covid tests: 9-13-21: Positive  · Rt Leg DVT  · Bilateral pulmonary emboli    Recommendations:   · Antibiotic treatment:  · Monitor off antibiotics  · Covid Rx:  · Remdesivir. Not indicated. Out of the window  · Decadron. Not indicated. Not on 02 supplements  · Will monitor CRP and 02 requirements and start if necessary  · Actemra- Not indicated      Medical Decision Making/Summary/Discussion:9/25/2021     · Patient admitted with suspected COVID 19 infection  · Covid test confirmed positive. · Patient admitted with PE and Rt leg DVT  · Currently no to minimal pulmonary signs. Does not require Covid Rx at this stage. · Anticoagulation for PE    Infection Control Recommendations   · Universal Precautions  · Airborne isolation  · Droplet Isolation. Until 10-2-21    Antimicrobial Stewardship Recommendations     · Discontinuation of therapy  Coordination of Outpatient Care:   · Estimated Length of IV antimicrobials:TBD  · Patient will need Midline Catheter Insertion: TBD  · Patient will need PICC line Insertion: No  · Patient will need: Home IV , [place],  SNF,  LTAC:TBD  · Patient will need outpatient wound care:No    Chief complaint/reason for consultation:   · Concern for COVID infection      History of Present Illness:   Rell Mcghee is a 46y.o.-year-old  male who was initially admitted on 9/24/2021. Patient seen at the request of Brendon Ferreira. INITIAL HISTORY:    Patient presented through ER at [facility] with complaints of worsening SOB of 2 days duration. He indicated testing positive for Covid on 9-13-21. He was convalescing well until he developed increasing SOB. In the ER he was diagnosed as having a Rt leg DVT and bilateral pulmonary emboli.     Patient was stable oxygenation sierra with initial 02 sat 96 % on room air. Patient admitted because of concerns with COVID 19 and bilateral PE.    CURRENT EVALUATION : 9/25/2021    Afebrile  VS stable    Patient exhibiting respiratory distress. No  Respiratory secretions: No    Patient receiving supplemental oxygen. No  RR 20  02 sat 98      QTc:       NEWS Score: 0-4 Low risk group; 5-6: Medium risk group; 7 or above: High risk group  Parameters 3 2 1 0 1 2 3   Age    < 65   ? 65   RR ? 8  9-11 12-20  21-24 ? 25   O2 Sats ? 91 92-93 94-95 ? 96      Suppl O2  Yes  No      SBP ? 90  101-110 111-219   ? 220   HR ? 40  41-50 51-90  111-130 ? 131   Consciousness    Alert   Drowsiness, lethargy, or confusion   Temperature ? 35.0 C (95.0 F)  35.1-36.0 C 95.1-96.9 F 36.1-38.0 C 97.0-100.4 F 38.1-39.0 C 100.5-102.3 F ? 39.1 C ? 102.4 F      NEWS Score:   9-24-21: 1 Low risk    Overall Daily Picture:      Worsening    Presence of secondary bacterial Infection:  No   Additional antibiotics: No    Labs, X rays reviewed: 9/25/2021    BUN: 14  Cr: 0.6    WBC: 9.1  Hb:12.9  Plat: 212    Absolute Neutrophils: 6.7  Absolute Lymphocytes:0.98  Neutrophil/Lymphocyte Ratio: 7 High risk    CRP:  Ferritin:  LDH:     Pro Calcitonin:      Cultures:  Urine:  ·   Blood:  ·   Sputum :  ·   Wound:       CXR:   · 9-24-21: Cardiomegaly. Mild interstitial congestion  CAT:  · 9-24-21: Acute PE in both lower lobes. Consolidation posterior basal RLL and LLL    Discussed with  RN, KRYSTIAN.    8-07-36: I have personally reviewed the past medical history, past surgical history, medications, social history, and family history, and I have updated the database accordingly.   Past Medical History:     Past Medical History:   Diagnosis Date    Bacterial pneumonia 2000    Hypertension     Pancreatitis 07/18/2021       Past Surgical  History:     Past Surgical History:   Procedure Laterality Date    CHOLECYSTECTOMY  07/21/2021    Dr. Shelley Mckay - laparoscopic, robotic    CHOLECYSTECTOMY, LAPAROSCOPIC N/A 7/21/2021    CHOLECYSTECTOMY LAPAROSCOPIC ROBOTIC performed by Iesha Calderon MD at [facility]         Medications:      [START ON 9/26/2021] remdesivir IVPB  100 mg IntraVENous Q24H    dexamethasone  6 mg Oral Daily    apixaban  10 mg Oral BID    lisinopril  10 mg Oral BID    NIFEdipine  30 mg Oral Daily    terbinafine  250 mg Oral Daily    sodium chloride flush  10 mL IntraVENous 2 times per day    famotidine  20 mg Oral BID    ascorbic acid  1,000 mg Oral 4x Daily    zinc sulfate  100 mg Oral Daily    vitamin D  50,000 Units Oral Weekly       Social History:     Social History     Socioeconomic History    Marital status:      Spouse name: Kym Jensen Number of children: 3    Years of education: Not on file    Highest education level: Not on file   Occupational History    Occupation: maintnence     Employer: [employer]   Tobacco Use    Smoking status: Never Smoker    Smokeless tobacco: Never Used   Vaping Use    Vaping Use: Never used   Substance and Sexual Activity    Alcohol use: Yes     Alcohol/week: 2.0 standard drinks     Types: 2 Shots of liquor per week     Comment: 4 days a week    Drug use: Never    Sexual activity: Yes   Other Topics Concern    Not on file   Social History Narrative    Not on file     Social Determinants of Health     Financial Resource Strain:     Difficulty of Paying Living Expenses:    Food Insecurity:     Worried About Running Out of Food in the Last Year:     [address] in the Last Year:    Transportation Needs:     Lack of Transportation (Medical):      Lack of Transportation (Non-Medical):    Physical Activity:     Days of Exercise per Week:     Minutes of Exercise per Session:    Stress:     Feeling of Stress :    Social Connections:     Frequency of Communication with Friends and Family:     Frequency of Social Gatherings with Friends and Family:     Attends Baptism Services:     Active Member of Clubs or Organizations:     Attends Club or Organization Meetings:     Marital Status:    Intimate Partner Violence:     Fear of Current or Ex-Partner:     Emotionally Abused:     Physically Abused:     Sexually Abused:        Family History:   No family history on file. Allergies:   Patient has no known allergies. Review of Systems:       Constitutional: No fevers or chills. No systemic complaints  Head: No headaches  Eyes: No double vision or blurry vision. No conjunctival inflammation. ENT: No sore throat or runny nose. . No hearing loss, tinnitus or vertigo. Cardiovascular: No chest pain or palpitations. Shortness of breath. CEDEÑO  Lung: Shortness of breath, cough. No sputum production  Abdomen: No nausea, vomiting, diarrhea, or abdominal pain. Gloria Graven No cramps. Genitourinary: No increased urinary frequency, or dysuria. No hematuria. No suprapubic or CVA pain  Musculoskeletal: No muscle aches or pains. No joint effusions, swelling or deformities  Hematologic: No bleeding or bruising. Neurologic: No headache, weakness, numbness, or tingling. Integument: No rash, no ulcers. Psychiatric: No depression. Endocrine: No polyuria, no polydipsia, no polyphagia. Physical Examination :     Patient Vitals for the past 8 hrs:   BP Temp Temp src Pulse Resp SpO2   09/25/21 1200 122/78 98.2 °F (36.8 °C) Oral 78 20 98 %     General Appearance: Awake, alert, and in no apparent distress  Head:  Normocephalic, no trauma  Eyes: Pupils equal, round, reactive to light; sclera anicteric; conjunctivae pink. No embolic phenomena. ENT: Oropharynx clear, without erythema, exudate, or thrush. No tenderness of sinuses. Mouth/throat: mucosa pink and moist. No lesions. Dentition in good repair. Neck:Supple, without lymphadenopathy. Thyroid normal, No bruits. Pulmonary/Chest: Clear to auscultation, without wheezes, rales, or rhonchi. No dullness to percussion.    Cardiovascular: Regular rate and rhythm without murmurs, rubs, or gallops. Abdomen: Soft, non tender. Bowel sounds normal. No organomegaly  All four Extremities: No cyanosis, clubbing, edema, or effusions. Neurologic: No gross sensory or motor deficits. Skin: Warm and dry with good turgor. No signs of peripheral arterial or venous insufficiency. No ulcerations. No open wounds. Medical Decision Making -Laboratory:   I have independently reviewed/ordered the following labs:    CBC with Differential:   Recent Labs     09/24/21  1305 09/25/21  0603   WBC 8.4 9.1   HGB 14.0 12.9*   HCT 41.0 37.7*    212   LYMPHOPCT 9* 11*   MONOPCT 13* 15*     BMP:   Recent Labs     09/24/21  1305 09/25/21  0603   * 135   K 3.6* 3.9   CL 98 100   CO2 22 23   BUN 13 14   CREATININE 0.69* 0.66*     Hepatic Function Panel:   Recent Labs     09/25/21  0603   PROT 7.0   LABALBU 3.7   BILITOT 1.14   ALKPHOS 82   ALT 18   AST 11     No results for input(s): RPR in the last 72 hours. No results for input(s): HIV in the last 72 hours. No results for input(s): BC in the last 72 hours. Lab Results   Component Value Date    MUCUS NOT REPORTED 07/18/2021    RBC 4.22 09/25/2021    TRICHOMONAS NOT REPORTED 07/18/2021    WBC 9.1 09/25/2021    YEAST NOT REPORTED 07/18/2021    TURBIDITY CLEAR 07/18/2021     Lab Results   Component Value Date    CREATININE 0.66 09/25/2021    GLUCOSE 119 09/25/2021       Medical Decision Making-Imaging:     EXAMINATION:   CTA OF THE CHEST 9/24/2021 2:00 pm       TECHNIQUE:   CTA of the chest was performed after the administration of intravenous   contrast.  Multiplanar reformatted images are provided for review.  MIP   images are provided for review.  Dose modulation, iterative reconstruction,   and/or weight based adjustment of the mA/kV was utilized to reduce the   radiation dose to as low as reasonably achievable.       COMPARISON:   None.       HISTORY:   ORDERING SYSTEM PROVIDED HISTORY: sob with exertion   TECHNOLOGIST PROVIDED HISTORY:   sob with exertion   Decision Support Exception - unselect if not a suspected or confirmed   emergency medical condition->Emergency Medical Condition (MA)       FINDINGS:   Pulmonary Arteries: There is filling defect in posterior basal right lower   lobe subsegmental pulmonary arterial branches and in posterior basal left   lower lobe and inferior branches.  Consistent with acute pulmonary emboli. No right heart strain.       Mediastinum: Cardiac size normal.  Some calcified right hilar lymph nodes   suggest granulomatous disease.  Thyroid gland and esophagus grossly normal.       Lungs/pleura: Bilateral posterior basal cell airspace consolidation right   greater than left most compatible.  This may represent pneumonia but could   also be developing pulmonary infarct from the emboli mentioned above. Calcified right lower lobe subpleural nodule.  No significant nodules or   masses.  No pleural effusion or pneumothorax.       Upper Abdomen: Limited images of the upper abdomen are unremarkable.       Soft Tissues/Bones: No acute bone or soft tissue abnormality.           Impression   1. Acute pulmonary emboli in bilateral lower lobes as discussed above.  No   right heart strain. 2. Airspace consolidation posterior basal right lower lobe and left lower   lobe which may represent pneumonia and/or pulmonary infarct from emboli   mentioned above.    Critical results were called by Juni Thomas MD to Dr. Tosha Johnson on   9/24/2021 at 2:25 p.m.             EXAMINATION:   ONE XRAY VIEW OF THE CHEST       9/24/2021 12:53 pm       COMPARISON:   July 18, 2021       HISTORY:   ORDERING SYSTEM PROVIDED HISTORY: dyspnea   TECHNOLOGIST PROVIDED HISTORY:   dyspnea       FINDINGS:   Stable cardiomegaly with mild interstitial prominence could indicate   pulmonary edema or interstitial pneumonia.  No large pleural effusion or   pneumothorax.  Mediastinum unremarkable.  Osseous structures grossly intact.           Impression Cardiomegaly with questionable mild congestive failure or interstitial   pneumonia. Medical Decision Aphbge-Povhkeyo-Tybku:       Medical Decision Making-Other:     Note:  · Labs, medications, radiologic studies were reviewed with personal review of films  · Large amounts of data were reviewed  · Discussed with nursing Staff, Discharge planner  · Infection Control and Prevention measures reviewed  · All prior entries were reviewed  · Administer medications as ordered  · Prognosis: Guarded  · Discharge planning reviewed  · Follow up as outpatient. Thank you for allowing us to participate in the care of this patient. Please call with questions.     Wali Brown MD  Pager: [phone] - Office: [phone]

## 2021-09-24 NOTE — PROGRESS NOTES
Initial shift assessment done and VS obtained as charted in flow sheet. Pt is A&Ox4, answers questions appropriately. Pt is currently sitting up in chair watching television, denies any pain at this time. Fresh water provided. Pt denies any needs. Call light and bedside table remain within reach.

## 2021-09-24 NOTE — H&P
[address] [place] [address]  History and Physical        Patient:  Caro Chavez  MRN: [id number]    Chief Complaint:    Chief Complaint   Patient presents with    Shortness of Breath     Onset 2 days ago. Diagnosed with Covid 11 days ago       History Obtained From:  patient, electronic medical record  PCP: Nils Espino DO    History of Present Illness: The patient is a 46 y.o. male who presents with shortness of breath for last 2 days. He had symptoms of fever cough and was tested positive for covid 11 days ago. He was doing well until 2 days ago,started having shoortness of breath and came to er today. Has has bilat pulmonary embolism and has rt leg dvt. He is admitted due to bilat pe and dvt associated with his covid 23 infection    Past Medical History:        Diagnosis Date    Bacterial pneumonia 2000    Hypertension     Pancreatitis 07/18/2021       Past Surgical History:        Procedure Laterality Date    CHOLECYSTECTOMY  07/21/2021    Dr. Romana Donning - laparoscopic, robotic    CHOLECYSTECTOMY, LAPAROSCOPIC N/A 7/21/2021    CHOLECYSTECTOMY LAPAROSCOPIC ROBOTIC performed by Ismael Gandhi MD at [address] [place] [address]         Medications Prior to Admission:    Prior to Admission medications    Medication Sig Start Date End Date Taking? Authorizing Provider   Cholecalciferol (VITAMIN D) 50 MCG (2000 UT) CAPS capsule Take by mouth   Yes Historical Provider, MD   NIFEdipine (ADALAT CC) 30 MG extended release tablet Take 1 tablet by mouth daily 7/23/21  Yes KEARA Alvarado - CNP   lisinopril (PRINIVIL;ZESTRIL) 10 MG tablet Take 10 mg by mouth 2 times daily   Yes Historical Provider, MD   terbinafine (LAMISIL) 250 MG tablet Take 250 mg by mouth daily   Yes Historical Provider, MD       Allergies:  Patient has no known allergies. Social History:   TOBACCO:   reports that he has never smoked.  He has never used smokeless tobacco.  ETOH:   reports current alcohol use of about 2.0 standard drinks of alcohol per week. Family History:   No family history on file. Review of Systems:  Constitutional:negative  for fevers, and negative for chills. Respiratory: positive for shortness of breath, negative for cough, and negative for wheezing  Cardiovascular: negative for chest pain, negative for palpitations, and negative for syncope  Gastrointestinal: negative for abdominal pain, negative for nausea,negative for vomiting, negative for diarrhea, negative for constipation, and negative for hematochezia or melena  Genitourinary: negative for dysuria, negative for urinary urgency, negative for urinary frequency, and negative for hematuria  Neurological: negative for unilateral weakness, numbness or tingling. All other systems were reviewed with the patient and are negative except as stated    Objective:    Vitals:   Temp: 98.8 °F (37.1 °C)  BP: 136/77  Resp: 20  Pulse: 100  SpO2: 95 %  -----------------------------------------------------------------  Exam:  GEN:    Awake, alert and oriented x3. EYES:  EOMI, pupils equal   NECK: Supple. No lymphadenopathy. No carotid bruit  CVS:    regular rate and rhythm, no audible murmur  PULM:  CTA, no wheezes, rales or rhonchi, no acute respiratory distress  ABD:    Bowels sounds normal.  Abdomen is soft. No distention. no tenderness to palpation. EXT:   no edema bilaterally . No calf tenderness. NEURO: Moves all extremities. Motor and sensory are grossly intact  SKIN:  No rashes.   No skin lesions.    -----------------------------------------------------------------  Diagnostic Data:   · All diagnostic data was reviewed  Lab Results   Component Value Date    WBC 8.4 09/24/2021    HGB 14.0 09/24/2021    MCV 91.3 09/24/2021     09/24/2021      Lab Results   Component Value Date    GLUCOSE 113 (H) 09/24/2021    BUN 13 09/24/2021    CREATININE 0.69 (L) 09/24/2021     (L) 09/24/2021    K 3.6 (L) 09/24/2021    CALCIUM 8.7 09/24/2021    CL 98 09/24/2021    CO2 22 09/24/2021     Lab Results   Component Value Date    WBCUA 2 TO 5 07/18/2021    RBCUA 0 TO 2 07/18/2021    EPITHUA 0 TO 2 07/18/2021    LEUKOCYTESUR NEGATIVE 07/18/2021    SPECGRAV 1.015 07/18/2021    GLUCOSEU NEGATIVE 07/18/2021    KETUA NEGATIVE 07/18/2021    PROTEINU 1+ (A) 07/18/2021    HGBUR NEGATIVE 07/18/2021    CASTUA NOT REPORTED 07/18/2021    CRYSTUA NOT REPORTED 07/18/2021    BACTERIA TRACE (A) 07/18/2021    YEAST NOT REPORTED 07/18/2021       Assessment:     Principal Problem:    Pulmonary embolism associated with COVID-19 [facility]  Active Problems:    Hypertension    Dyspnea on exertion  Resolved Problems:    * No resolved hospital problems. *      Plan:     Problem List     Dyspnea on exertion - Primary    * (Principal) Pulmonary embolism associated with COVID-19 ([address]           · This patient requires inpatient admission because of bilat pe requiring anticoagulation  · Factors affecting the medical complexity of this patient include recent covid 19 infection,dvt,htn  · Estimated length of stay is 2 days  · Discussed patient's symptoms and data results including labs and imaging studies with the ER MD at time of admission  · High risk drug monitoring: none  ·     CORE MEASURES  · DVT prophylaxis: Lovenox  · Decubitus ulcer present on admission: No  · CODE STATUS: FULL CODE  · Nutrition Status: good   · Physical therapy: Yes   · Old Charts reviewed: Yes  · EKG Reviewed:  Yes  · Advance Directive Addressed: Yes    Little Jerry MD , M.D.  9/24/2021  7:40 PM

## 2021-09-24 NOTE — ED PROVIDER NOTES
tobacco. He reports current alcohol use of about 2.0 standard drinks of alcohol per week. He reports that he does not use drugs. PHYSICAL EXAM     INITIAL VITALS: BP (!) 149/95   Pulse 106   Temp 99.6 °F (37.6 °C) (Tympanic)   Resp 14   Ht 6' 1\" (1.854 m)   Wt (!) 324 lb (147 kg)   SpO2 98%   BMI 42.75 kg/m²      Physical Exam  Vitals and nursing note reviewed. Constitutional:       Appearance: He is well-developed. HENT:      Head: Normocephalic and atraumatic. Eyes:      Pupils: Pupils are equal, round, and reactive to light. Cardiovascular:      Rate and Rhythm: Normal rate and regular rhythm. Heart sounds: Normal heart sounds. No murmur heard. Pulmonary:      Effort: Pulmonary effort is normal.      Breath sounds: Decreased breath sounds present. Abdominal:      General: Bowel sounds are normal.      Palpations: Abdomen is soft. Tenderness: There is no abdominal tenderness. Musculoskeletal:         General: Normal range of motion. Cervical back: Normal range of motion. Skin:     General: Skin is warm and dry. Capillary Refill: Capillary refill takes less than 2 seconds. Neurological:      Mental Status: He is alert and oriented to person, place, and time. MEDICAL DECISION MAKING:     Admiot dx PE, stable, Lovenox started I spoke with the patient about this. With Dr Joe Gifford who agreed to admit. Patient updated    DIAGNOSTIC RESULTS     EKG: All EKG's are interpreted by the Emergency Department Physician who either signs or Co-signs this chart in the absence of acardiologist.        RADIOLOGY:Allplain film, CT, MRI, and formal ultrasound images (except ED bedside ultrasound) are read by the radiologist and the images and interpretations are directly viewed by the emergency physician. LABS:All lab results were reviewed by myself, and all abnormals are listed below.   Labs Reviewed   BASIC METABOLIC PANEL - Abnormal; Notable for the following components:       Result Value    Glucose 113 (*)     CREATININE 0.69 (*)     Sodium 134 (*)     Potassium 3.6 (*)     All other components within normal limits   BRAIN NATRIURETIC PEPTIDE - Abnormal; Notable for the following components:    Pro- (*)     All other components within normal limits   CBC WITH AUTO DIFFERENTIAL - Abnormal; Notable for the following components:    Seg Neutrophils 78 (*)     Lymphocytes 9 (*)     Monocytes 13 (*)     Eosinophils % 0 (*)     Absolute Lymph # 0.71 (*)     All other components within normal limits   D-DIMER, QUANTITATIVE - Abnormal; Notable for the following components:    D-Dimer, Quant 4.10 (*)     All other components within normal limits   TROPONIN         EMERGENCY DEPARTMENT COURSE:   Vitals:    Vitals:    09/24/21 1235 09/24/21 1237 09/24/21 1444   BP:  (!) 149/95    Pulse: 106     Resp:   14   Temp: 99.6 °F (37.6 °C)     TempSrc: Tympanic     SpO2: 99%  98%   Weight: (!) 324 lb (147 kg)     Height: 6' 1\" (1.854 m)         The patient was given the following medications while in the emergency department:  Orders Placed This Encounter   Medications    0.9 % sodium chloride infusion    dexamethasone (DECADRON) injection 10 mg    ipratropium-albuterol (DUONEB) nebulizer solution 1 ampule     Order Specific Question:   Initiate RT Bronchodilator Protocol     Answer: Yes    iopamidol (ISOVUE-370) 76 % injection 75 mL    enoxaparin (LOVENOX) injection 150 mg       -------------------------      CRITICAL CARE:       CONSULTS:  None    PROCEDURES:  Procedures    FINAL IMPRESSION      1. Dyspnea on exertion    2. Pulmonary embolism associated with COVID-19 [facility])          DISPOSITION/PLAN   DISPOSITION Decision To Admit 09/24/2021 03:10:27 PM      PATIENT REFERREDTO:  No follow-up provider specified.     DISCHARGEMEDICATIONS:  New Prescriptions    No medications on file       (Please note that portions of this note were completed with a voice recognition program. Efforts were made to edit thedictations but occasionally words are mis-transcribed.)    TRACE Nieves PA-C  09/24/21 9204

## 2021-09-25 PROBLEM — E66.01 MORBID OBESITY (HCC): Status: ACTIVE | Noted: 2021-09-24

## 2021-09-25 LAB
ABSOLUTE EOS #: <0.03 K/UL (ref 0–0.44)
ABSOLUTE IMMATURE GRANULOCYTE: <0.03 K/UL (ref 0–0.3)
ABSOLUTE LYMPH #: 0.98 K/UL (ref 1.1–3.7)
ABSOLUTE MONO #: 1.38 K/UL (ref 0.1–1.2)
ALBUMIN SERPL-MCNC: 3.7 G/DL (ref 3.5–5.2)
ALBUMIN/GLOBULIN RATIO: 1.1 (ref 1–2.5)
ALP BLD-CCNC: 82 U/L (ref 40–129)
ALT SERPL-CCNC: 18 U/L (ref 5–41)
ANION GAP SERPL CALCULATED.3IONS-SCNC: 12 MMOL/L (ref 9–17)
AST SERPL-CCNC: 11 U/L
BASOPHILS # BLD: 0 % (ref 0–2)
BASOPHILS ABSOLUTE: <0.03 K/UL (ref 0–0.2)
BILIRUB SERPL-MCNC: 1.14 MG/DL (ref 0.3–1.2)
BUN BLDV-MCNC: 14 MG/DL (ref 6–20)
BUN/CREAT BLD: 21 (ref 9–20)
C-REACTIVE PROTEIN: 180.6 MG/L (ref 0–5)
CALCIUM SERPL-MCNC: 9 MG/DL (ref 8.6–10.4)
CHLORIDE BLD-SCNC: 100 MMOL/L (ref 98–107)
CO2: 23 MMOL/L (ref 20–31)
CREAT SERPL-MCNC: 0.66 MG/DL (ref 0.7–1.2)
DIFFERENTIAL TYPE: ABNORMAL
EOSINOPHILS RELATIVE PERCENT: 0 % (ref 1–4)
FERRITIN: 645 UG/L (ref 30–400)
GFR AFRICAN AMERICAN: >60 ML/MIN
GFR NON-AFRICAN AMERICAN: >60 ML/MIN
GFR SERPL CREATININE-BSD FRML MDRD: ABNORMAL ML/MIN/{1.73_M2}
GFR SERPL CREATININE-BSD FRML MDRD: ABNORMAL ML/MIN/{1.73_M2}
GLUCOSE BLD-MCNC: 119 MG/DL (ref 70–99)
HCT VFR BLD CALC: 37.7 % (ref 40.7–50.3)
HEMOGLOBIN: 12.9 G/DL (ref 13–17)
IMMATURE GRANULOCYTES: 0 %
LYMPHOCYTES # BLD: 11 % (ref 24–43)
MCH RBC QN AUTO: 30.6 PG (ref 25.2–33.5)
MCHC RBC AUTO-ENTMCNC: 34.2 G/DL (ref 28.4–34.8)
MCV RBC AUTO: 89.3 FL (ref 82.6–102.9)
MONOCYTES # BLD: 15 % (ref 3–12)
NRBC AUTOMATED: 0 PER 100 WBC
PDW BLD-RTO: 12.8 % (ref 11.8–14.4)
PLATELET # BLD: 212 K/UL (ref 138–453)
PLATELET ESTIMATE: ABNORMAL
PMV BLD AUTO: 10.3 FL (ref 8.1–13.5)
POTASSIUM SERPL-SCNC: 3.9 MMOL/L (ref 3.7–5.3)
RBC # BLD: 4.22 M/UL (ref 4.21–5.77)
RBC # BLD: ABNORMAL 10*6/UL
SEG NEUTROPHILS: 74 % (ref 36–65)
SEGMENTED NEUTROPHILS ABSOLUTE COUNT: 6.74 K/UL (ref 1.5–8.1)
SODIUM BLD-SCNC: 135 MMOL/L (ref 135–144)
TOTAL PROTEIN: 7 G/DL (ref 6.4–8.3)
WBC # BLD: 9.1 K/UL (ref 3.5–11.3)
WBC # BLD: ABNORMAL 10*3/UL

## 2021-09-25 PROCEDURE — 94761 N-INVAS EAR/PLS OXIMETRY MLT: CPT

## 2021-09-25 PROCEDURE — 6370000000 HC RX 637 (ALT 250 FOR IP): Performed by: FAMILY MEDICINE

## 2021-09-25 PROCEDURE — 2580000003 HC RX 258: Performed by: INTERNAL MEDICINE

## 2021-09-25 PROCEDURE — 6360000002 HC RX W HCPCS: Performed by: NURSE PRACTITIONER

## 2021-09-25 PROCEDURE — 99233 SBSQ HOSP IP/OBS HIGH 50: CPT | Performed by: INTERNAL MEDICINE

## 2021-09-25 PROCEDURE — 80053 COMPREHEN METABOLIC PANEL: CPT

## 2021-09-25 PROCEDURE — 1200000000 HC SEMI PRIVATE

## 2021-09-25 PROCEDURE — 2500000003 HC RX 250 WO HCPCS: Performed by: INTERNAL MEDICINE

## 2021-09-25 PROCEDURE — 86140 C-REACTIVE PROTEIN: CPT

## 2021-09-25 PROCEDURE — 6370000000 HC RX 637 (ALT 250 FOR IP): Performed by: NURSE PRACTITIONER

## 2021-09-25 PROCEDURE — 2580000003 HC RX 258: Performed by: NURSE PRACTITIONER

## 2021-09-25 PROCEDURE — 82728 ASSAY OF FERRITIN: CPT

## 2021-09-25 PROCEDURE — 85025 COMPLETE CBC W/AUTO DIFF WBC: CPT

## 2021-09-25 PROCEDURE — 6360000002 HC RX W HCPCS: Performed by: INTERNAL MEDICINE

## 2021-09-25 PROCEDURE — 36415 COLL VENOUS BLD VENIPUNCTURE: CPT

## 2021-09-25 RX ORDER — DEXAMETHASONE 4 MG/1
6 TABLET ORAL DAILY
Status: DISCONTINUED | OUTPATIENT
Start: 2021-09-25 | End: 2021-09-25

## 2021-09-25 RX ADMIN — OXYCODONE HYDROCHLORIDE AND ACETAMINOPHEN 1000 MG: 500 TABLET ORAL at 20:42

## 2021-09-25 RX ADMIN — DEXAMETHASONE 6 MG: 4 TABLET ORAL at 10:22

## 2021-09-25 RX ADMIN — APIXABAN 10 MG: 5 TABLET, FILM COATED ORAL at 20:42

## 2021-09-25 RX ADMIN — FAMOTIDINE 20 MG: 20 TABLET, FILM COATED ORAL at 20:42

## 2021-09-25 RX ADMIN — LISINOPRIL 10 MG: 10 TABLET ORAL at 08:16

## 2021-09-25 RX ADMIN — NIFEDIPINE 30 MG: 30 TABLET, EXTENDED RELEASE ORAL at 08:16

## 2021-09-25 RX ADMIN — ENOXAPARIN SODIUM 150 MG: 150 INJECTION SUBCUTANEOUS at 08:16

## 2021-09-25 RX ADMIN — LISINOPRIL 10 MG: 10 TABLET ORAL at 20:42

## 2021-09-25 RX ADMIN — REMDESIVIR 200 MG: 100 INJECTION, POWDER, LYOPHILIZED, FOR SOLUTION INTRAVENOUS at 12:09

## 2021-09-25 RX ADMIN — SODIUM CHLORIDE, PRESERVATIVE FREE 10 ML: 5 INJECTION INTRAVENOUS at 08:16

## 2021-09-25 RX ADMIN — FAMOTIDINE 20 MG: 20 TABLET, FILM COATED ORAL at 08:16

## 2021-09-25 RX ADMIN — OXYCODONE HYDROCHLORIDE AND ACETAMINOPHEN 1000 MG: 500 TABLET ORAL at 16:27

## 2021-09-25 RX ADMIN — OXYCODONE HYDROCHLORIDE AND ACETAMINOPHEN 1000 MG: 500 TABLET ORAL at 08:16

## 2021-09-25 RX ADMIN — SODIUM CHLORIDE, PRESERVATIVE FREE 10 ML: 5 INJECTION INTRAVENOUS at 20:44

## 2021-09-25 RX ADMIN — OXYCODONE HYDROCHLORIDE AND ACETAMINOPHEN 1000 MG: 500 TABLET ORAL at 12:09

## 2021-09-25 RX ADMIN — Medication 100 MG: at 08:16

## 2021-09-25 ASSESSMENT — PAIN SCALES - GENERAL
PAINLEVEL_OUTOF10: 0

## 2021-09-25 NOTE — PROGRESS NOTES
Progress Note  Brandon Santos MD    OBJECTIVE:    Patient seen for f/u of Pulmonary embolism associated with COVID-19 ([address] He feels better     ROS:   Constitutional: negative  for fevers, and negative for chills. Respiratory: negative for shortness of breath, negative for cough, and negative for wheezing  Cardiovascular: negative for chest pain, and negative for palpitations  Gastrointestinal: negative for abdominal pain, negative for nausea,negative for vomiting, negative for diarrhea, and negative for constipation     All other systems were reviewed with the patient and are negative unless otherwise stated in HPI    OBJECTIVE:  Vitals:   Temp: 98.2 °F (36.8 °C)  BP: 122/78  Resp: 20  Pulse: 78  SpO2: 98 %    24HR INTAKE/OUTPUT:      Intake/Output Summary (Last 24 hours) at 9/25/2021 1531  Last data filed at 9/25/2021 8871  Gross per 24 hour   Intake 1650 ml   Output --   Net 1650 ml     -----------------------------------------------------------------  Exam:  GEN:    Awake, alert and oriented x3. EYES:  EOMI, pupils equal   NECK: Supple. No lymphadenopathy. No carotid bruit  CVS:    regular rate and rhythm, no audible murmur  PULM:  CTA, no wheezes, rales or rhonchi, no acute respiratory distress  ABD:    Bowels sounds normal.  Abdomen is soft. No distention. no tenderness to palpation. EXT:   no edema bilaterally . No calf tenderness. NEURO: Moves all extremities. Motor and sensory are grossly intact  SKIN:  No rashes.   No skin lesions.    -----------------------------------------------------------------  Diagnostic Data:    · All available data reviewed  Lab Results   Component Value Date    WBC 9.1 09/25/2021    HGB 12.9 (L) 09/25/2021    MCV 89.3 09/25/2021     09/25/2021      Lab Results   Component Value Date    GLUCOSE 119 (H) 09/25/2021    BUN 14 09/25/2021    CREATININE 0.66 (L) 09/25/2021     09/25/2021    K 3.9 09/25/2021    CALCIUM 9.0 09/25/2021     09/25/2021 CO2 23 09/25/2021       PROBLEM LIST:  Principal Problem:    Pulmonary embolism associated with COVID-19 ([facility]  Active Problems:    Hypertension    Dyspnea on exertion    Deep vein thrombosis of right lower extremity (HCC)  Resolved Problems:    * No resolved hospital problems. *      ASSESSMENT / PLAN:  Pulmonary embolism associated with COVID-19 ([facility] [address]  · Continue current therapy anticoagulation  ·   · Id consult appaddison- Chance Anchors started. Patient not sure if he wants to continue  · Nutrition status:  Well developed, well nourished with no malnutrition  · DVT prophylaxis: Lovenox   · High risk medications: none   · Disposition:  Discharge plan is home    Madelyn Rice MD , MRAULITO.  9/25/2021  3:31 PM

## 2021-09-25 NOTE — PROGRESS NOTES
Medications administered as ordered. Pt is currently in bed watching television, denies any other needs. Call light remains within reach. Towels provided if patient would like to wash up but patient states he would rather wait until morning and denies any assistance to do so. Will continue to monitor.

## 2021-09-25 NOTE — PROGRESS NOTES
[facility] [address] Patient  Today's Date and Time: 9/25/2021, 4:10 PM    Impression :     · COVID 19 Confirmed Infection  · Covid tests: 9-13-21: Positive  · Rt Leg DVT  · Bilateral pulmonary emboli        Patient evaluated by Telemedicine. Requesting Institution: [facility]  Provider Institution: [address] [place] [address],[facility] [address] [place], [address] [facility] Person at [facility]: Ms Shade David, APRN- IM        Recommendations:   · Antibiotic treatment:  · Monitor off antibiotics  · Covid Rx:  · Remdesivir. Not indicated. Out of the window  · Decadron. Not indicated. Not on 02 supplements  · Will monitor CRP and 02 requirements and start if necessary  · Actemra- Not indicated  · Anticoagulation as per Primary      Medical Decision Making/Summary/Discussion:9/25/2021     · Patient admitted with suspected COVID 19 infection  · Covid test confirmed positive. · Patient admitted with PE and Rt leg DVT  · Currently no to minimal pulmonary signs. Does not require anti Covid or antiinflammatory Rx at this stage. · Anticoagulation for PE  · Individuals with Covid have a higher incidence of DVT and PE because of the effect of the virus in inducing vascular endothelial inflammation. This is the likely relationship between Covid and DVT, PE in this case. Infection Control Recommendations   · Universal Precautions  · Airborne isolation  · Droplet Isolation.  Until 10-2-21    Antimicrobial Stewardship Recommendations     · Discontinuation of therapy  Coordination of Outpatient Care:   · Estimated Length of IV antimicrobials:TBD  · Patient will need Midline Catheter Insertion: TBD  · Patient will need PICC line Insertion: No  · Patient will need: Home IV , [place],  SNF,  LTAC:TBD  · Patient will need outpatient wound care:No    Chief complaint/reason for consultation:   · Concern for COVID infection      History of Present Illness:   Nelsy Rivera is a 46 y.o.-year-old  male who was initially admitted on 9/24/2021. Patient seen at the request of Jass Araya. INITIAL HISTORY:    Patient presented through ER at [facility] [address] with complaints of worsening SOB of 2 days duration. He indicated testing positive for Covid on 9-13-21. He was convalescing well until he developed increasing SOB. In the ER he was diagnosed as having a Rt leg DVT and bilateral pulmonary emboli. Patient was stable oxygenation wise with initial 02 sat 96 % on room air. Patient admitted because of concerns with COVID 19 and bilateral PE.    CURRENT EVALUATION : 9/25/2021    Afebrile  VS stable  Comfortable    Patient exhibiting respiratory distress. No  Respiratory secretions: No    Patient receiving supplemental oxygen. No  RR 20  02 sat 98      QTc:       NEWS Score: 0-4 Low risk group; 5-6: Medium risk group; 7 or above: High risk group  Parameters 3 2 1 0 1 2 3   Age    < 65   ? 65   RR ? 8  9-11 12-20  21-24 ? 25   O2 Sats ? 91 92-93 94-95 ? 96      Suppl O2  Yes  No      SBP ? 90  101-110 111-219   ? 220   HR ? 40  41-50 51-90  111-130 ? 131   Consciousness    Alert   Drowsiness, lethargy, or confusion   Temperature ? 35.0 C (95.0 F)  35.1-36.0 C 95.1-96.9 F 36.1-38.0 C 97.0-100.4 F 38.1-39.0 C 100.5-102.3 F ? 39.1 C ? 102.4 F      NEWS Score:   9-24-21: 1 Low risk    Overall Daily Picture:      Unchanged    Presence of secondary bacterial Infection:  No   Additional antibiotics: No    Labs, X rays reviewed: 9/25/2021    BUN: 14  Cr: 0.6    WBC: 9.1  Hb:12.9  Plat: 212    Absolute Neutrophils: 6.7  Absolute Lymphocytes:0.98  Neutrophil/Lymphocyte Ratio: 7 High risk    CRP:  Ferritin:  LDH:     Pro Calcitonin:      Cultures:  Urine:  ·   Blood:  ·   Sputum :  ·   Wound:       CXR:   · 9-24-21: Cardiomegaly. Mild interstitial congestion  CAT:  · 9-24-21: Acute PE in both lower lobes.  Consolidation posterior basal RLL and LLL    Discussed with  RN, IM.    9-24-21: I have personally reviewed the past medical history, past surgical history, medications, social history, and family history, and I have updated the database accordingly. Past Medical History:     Past Medical History:   Diagnosis Date    Bacterial pneumonia 2000    Hypertension     Pancreatitis 07/18/2021       Past Surgical  History:     Past Surgical History:   Procedure Laterality Date    CHOLECYSTECTOMY  07/21/2021    Dr. Sylvia Multani - laparoscopic, robotic    CHOLECYSTECTOMY, LAPAROSCOPIC N/A 7/21/2021    CHOLECYSTECTOMY LAPAROSCOPIC ROBOTIC performed by Katherin Chen MD at [address] [place] [address] N         Medications:      apixaban  10 mg Oral BID    lisinopril  10 mg Oral BID    NIFEdipine  30 mg Oral Daily    terbinafine  250 mg Oral Daily    sodium chloride flush  10 mL IntraVENous 2 times per day    famotidine  20 mg Oral BID    ascorbic acid  1,000 mg Oral 4x Daily    zinc sulfate  100 mg Oral Daily    vitamin D  50,000 Units Oral Weekly       Social History:     Social History     Socioeconomic History    Marital status:      Spouse name: Jennifer Mims Number of children: 3    Years of education: Not on file    Highest education level: Not on file   Occupational History    Occupation: maintnence     Employer: [employer]   Tobacco Use    Smoking status: Never Smoker    Smokeless tobacco: Never Used   Vaping Use    Vaping Use: Never used   Substance and Sexual Activity    Alcohol use:  Yes     Alcohol/week: 2.0 standard drinks     Types: 2 Shots of liquor per week     Comment: 4 days a week    Drug use: Never    Sexual activity: Yes   Other Topics Concern    Not on file   Social History Narrative    Not on file     Social Determinants of Health     Financial Resource Strain:     Difficulty of Paying Living Expenses:    Food Insecurity:     Worried About Running Out of Food in the Last Year:     [address] in the Last Year:    Transportation Needs:     Lack of Transportation (Medical):  Lack of Transportation (Non-Medical):    Physical Activity:     Days of Exercise per Week:     Minutes of Exercise per Session:    Stress:     Feeling of Stress :    Social Connections:     Frequency of Communication with Friends and Family:     Frequency of Social Gatherings with Friends and Family:     Attends Restorationism Services:     Active Member of Clubs or Organizations:     Attends Club or Organization Meetings:     Marital Status:    Intimate Partner Violence:     Fear of Current or Ex-Partner:     Emotionally Abused:     Physically Abused:     Sexually Abused:        Family History:   No family history on file. Allergies:   Patient has no known allergies. Review of Systems:       Constitutional: No fevers or chills. No systemic complaints  Head: No headaches  Eyes: No double vision or blurry vision. No conjunctival inflammation. ENT: No sore throat or runny nose. . No hearing loss, tinnitus or vertigo. Cardiovascular: No chest pain or palpitations. Shortness of breath. CEDEÑO  Lung: Shortness of breath, cough. No sputum production  Abdomen: No nausea, vomiting, diarrhea, or abdominal pain. Mitch Leonardo No cramps. Genitourinary: No increased urinary frequency, or dysuria. No hematuria. No suprapubic or CVA pain  Musculoskeletal: No muscle aches or pains. No joint effusions, swelling or deformities  Hematologic: No bleeding or bruising. Neurologic: No headache, weakness, numbness, or tingling. Integument: No rash, no ulcers. Psychiatric: No depression. Endocrine: No polyuria, no polydipsia, no polyphagia. Physical Examination :     Patient Vitals for the past 8 hrs:   BP Temp Temp src Pulse Resp SpO2   09/25/21 1200 122/78 98.2 °F (36.8 °C) Oral 78 20 98 %     General Appearance: Awake, alert, and in no apparent distress  Head:  Normocephalic, no trauma  Eyes: Pupils equal, round, reactive to light; sclera anicteric; conjunctivae pink.  No embolic phenomena. ENT: Oropharynx clear, without erythema, exudate, or thrush. No tenderness of sinuses. Mouth/throat: mucosa pink and moist. No lesions. Dentition in good repair. Neck:Supple, without lymphadenopathy. Thyroid normal, No bruits. Pulmonary/Chest: Clear to auscultation, without wheezes, rales, or rhonchi. No dullness to percussion. Cardiovascular: Regular rate and rhythm without murmurs, rubs, or gallops. Abdomen: Soft, non tender. Bowel sounds normal. No organomegaly  All four Extremities: No cyanosis, clubbing, edema, or effusions. Neurologic: No gross sensory or motor deficits. Skin: Warm and dry with good turgor. No signs of peripheral arterial or venous insufficiency. No ulcerations. No open wounds. Medical Decision Making -Laboratory:   I have independently reviewed/ordered the following labs:    CBC with Differential:   Recent Labs     09/24/21  1305 09/25/21  0603   WBC 8.4 9.1   HGB 14.0 12.9*   HCT 41.0 37.7*    212   LYMPHOPCT 9* 11*   MONOPCT 13* 15*     BMP:   Recent Labs     09/24/21  1305 09/25/21  0603   * 135   K 3.6* 3.9   CL 98 100   CO2 22 23   BUN 13 14   CREATININE 0.69* 0.66*     Hepatic Function Panel:   Recent Labs     09/25/21  0603   PROT 7.0   LABALBU 3.7   BILITOT 1.14   ALKPHOS 82   ALT 18   AST 11     No results for input(s): RPR in the last 72 hours. No results for input(s): HIV in the last 72 hours. No results for input(s): BC in the last 72 hours.   Lab Results   Component Value Date    MUCUS NOT REPORTED 07/18/2021    RBC 4.22 09/25/2021    TRICHOMONAS NOT REPORTED 07/18/2021    WBC 9.1 09/25/2021    YEAST NOT REPORTED 07/18/2021    TURBIDITY CLEAR 07/18/2021     Lab Results   Component Value Date    CREATININE 0.66 09/25/2021    GLUCOSE 119 09/25/2021       Medical Decision Making-Imaging:     EXAMINATION:   CTA OF THE CHEST 9/24/2021 2:00 pm       TECHNIQUE:   CTA of the chest was performed after the administration of intravenous   contrast.  Multiplanar reformatted images are provided for review.  MIP   images are provided for review. Dose modulation, iterative reconstruction,   and/or weight based adjustment of the mA/kV was utilized to reduce the   radiation dose to as low as reasonably achievable.       COMPARISON:   None.       HISTORY:   ORDERING SYSTEM PROVIDED HISTORY: sob with exertion   TECHNOLOGIST PROVIDED HISTORY:   sob with exertion   Decision Support Exception - unselect if not a suspected or confirmed   emergency medical condition->Emergency Medical Condition (MA)       FINDINGS:   Pulmonary Arteries: There is filling defect in posterior basal right lower   lobe subsegmental pulmonary arterial branches and in posterior basal left   lower lobe and inferior branches.  Consistent with acute pulmonary emboli. No right heart strain.       Mediastinum: Cardiac size normal.  Some calcified right hilar lymph nodes   suggest granulomatous disease.  Thyroid gland and esophagus grossly normal.       Lungs/pleura: Bilateral posterior basal cell airspace consolidation right   greater than left most compatible.  This may represent pneumonia but could   also be developing pulmonary infarct from the emboli mentioned above. Calcified right lower lobe subpleural nodule.  No significant nodules or   masses.  No pleural effusion or pneumothorax.       Upper Abdomen: Limited images of the upper abdomen are unremarkable.       Soft Tissues/Bones: No acute bone or soft tissue abnormality.           Impression   1. Acute pulmonary emboli in bilateral lower lobes as discussed above.  No   right heart strain. 2. Airspace consolidation posterior basal right lower lobe and left lower   lobe which may represent pneumonia and/or pulmonary infarct from emboli   mentioned above.    Critical results were called by Juni Thomas MD to Dr. Tosha Johnson on   9/24/2021 at 2:25 p.m.             EXAMINATION:   ONE XRAY VIEW OF THE CHEST       9/24/2021 12:53 pm     COMPARISON:   July 18, 2021       HISTORY:   ORDERING SYSTEM PROVIDED HISTORY: dyspnea   TECHNOLOGIST PROVIDED HISTORY:   dyspnea       FINDINGS:   Stable cardiomegaly with mild interstitial prominence could indicate   pulmonary edema or interstitial pneumonia.  No large pleural effusion or   pneumothorax.  Mediastinum unremarkable.  Osseous structures grossly intact.           Impression   Cardiomegaly with questionable mild congestive failure or interstitial   pneumonia. Medical Decision [id number]:       Medical Decision Making-Other:     Note:  · Labs, medications, radiologic studies were reviewed with personal review of films  · Large amounts of data were reviewed  · Discussed with nursing Staff, Discharge planner  · Infection Control and Prevention measures reviewed  · All prior entries were reviewed  · Administer medications as ordered  · Prognosis: Guarded  · Discharge planning reviewed  · Follow up as outpatient. Thank you for allowing us to participate in the care of this patient. Please call with questions.     Norman Farris MD  Pager: [phone] - Office: [phone]

## 2021-09-25 NOTE — PROGRESS NOTES
Dr. Vivian Sheppard, infectious disease, called in at this time and spoke with writer regarding the patient. Per Dr. Vivian Sheppard, order a CRP and ferritin and call her back on her cell phone when results are back.

## 2021-09-25 NOTE — PROGRESS NOTES
Physician Progress Note      PATIENT:               Bethany Cleary  CSN #:                  [id number]  :                       1970  ADMIT DATE:       2021 12:43 PM  [address] [place] DATE:  RESPONDING  PROVIDER #:        Edward Jolly MD          QUERY TEXT:    Pt admitted due to bilat pe and dvt associated with his covid 19 infection    If possible, please clarify the relationship between PE/ DVT and Covid-19    The medical record reflects the following:  Risk Factors: recent Covid-19  infection  Clinical Indicators: SOB; per H&P: tested positive for covid 11 days   ago. ..doing well until 2 days ago  Treatment: Lovenox, Acapella, not in [address] [place] [address]  Clinical   [phone]  .   Options provided:  -- Pulmonary embolism and RLE DVT are sequela of prior COVID infection  -- Pulmonary embolism and RLE DVT are associated with active COVID infection  -- Other - I will add my own diagnosis  -- Disagree - Not applicable / Not valid  -- Disagree - Clinically unable to determine / Unknown  -- Refer to Clinical Documentation Reviewer    PROVIDER RESPONSE TEXT:    Already addressed    Query created by: Stacey Ferro on 2021 12:00 PM      Electronically signed by:  Edward Jolly MD 2021 3:35 PM

## 2021-09-25 NOTE — PROGRESS NOTES
Patient is currently in bed with his eyes closed. 2nd assessment has been completed as of now. Vitals are within the normal except for his B/P being elevated (148/87), will notify the oncoming nurse during the shift change report. Respirations are easy and unlabored as of now. Patient's O2 saturation is in mid 90's on room air. Patient denies of any pain or discomfort as of now. Further needs are assessed. Safety precautions are in place. Will continue to monitor.

## 2021-09-25 NOTE — PROGRESS NOTES
Remdesivir Initiation & Daily Monitoring    Physician requesting remdesivir (use limited to ID): Aouad    Demonstrated benefit is to shorten the duration of illness, mortality benefit has not been shown. Seems to be most beneficial early in the disease course. Criteria for use (confirm all are met): yes/no  Suspected/Confirmed COVID-19 positive and requiring hospitalization-yes  Patient requires supplemental oxygen (this is the population for whom remdesivir demonstrated a shortened duration of illness; benefit less clear for those on high flow oxygen or mechanical ventilation, but may be reasonable to consider if change in respiratory status was recent)-NO, but Infectious disease specialist Dr. Yuliana George states that is beneficial to not wait a day and to start the medication now   Not recommended to be used in patients with baseline ALT 5x ULN or more-normal    There are no PK data available for severe renal impairment (eGFR < 30 mL/min) or on RRT. Consider risk/benefit for individual patient. Baseline CrCl: Estimated Creatinine Clearance: 197 mL/min (A) (based on SCr of 0.66 mg/dL (L)). Ensure LFTs are ordered at baseline & consider if monitoring during therapy is appropriate (this information is included in CMP or can be ordered separately). Package insert states: Perform hepatic laboratory testing in all patients before starting VEKLURY and while receiving VEKLURY as clinically appropriate. Baseline ALT: 18    Duration of therapy should be limited to 5 days. Anticipated stop date: 9/29/21    Also assess corticosteroid therapy: If patient has had symptoms for 7 days or more and is on supplemental oxygen, then a course of dexamethasone can also be considered. Dexamethasone indicated? Dr. Yuliana George ordered dexamethasone 6 mg daily   If yes, is dexamethasone ordered?  yes  Consider baseline CRP (steroids may be beneficial if > 20 mg/L and may be harmful if < 10 mg/L): ordered    Spoke with Dr. Yuliana George via telephone 9/25/21 1000am and 2 medication orders verbally ordered-remdesevir and dexamthasone.    Electronically signed by Maranda Acosta [facility] on 9/25/2021 at 10:18 AM

## 2021-09-26 VITALS
WEIGHT: 315 LBS | RESPIRATION RATE: 16 BRPM | TEMPERATURE: 97.7 F | OXYGEN SATURATION: 93 % | BODY MASS INDEX: 41.75 KG/M2 | HEIGHT: 73 IN | DIASTOLIC BLOOD PRESSURE: 87 MMHG | HEART RATE: 58 BPM | SYSTOLIC BLOOD PRESSURE: 137 MMHG

## 2021-09-26 LAB
ABSOLUTE EOS #: <0.03 K/UL (ref 0–0.44)
ABSOLUTE IMMATURE GRANULOCYTE: 0.05 K/UL (ref 0–0.3)
ABSOLUTE LYMPH #: 0.99 K/UL (ref 1.1–3.7)
ABSOLUTE MONO #: 1.23 K/UL (ref 0.1–1.2)
BASOPHILS # BLD: 0 % (ref 0–2)
BASOPHILS ABSOLUTE: <0.03 K/UL (ref 0–0.2)
DIFFERENTIAL TYPE: ABNORMAL
EOSINOPHILS RELATIVE PERCENT: 0 % (ref 1–4)
HCT VFR BLD CALC: 36.8 % (ref 40.7–50.3)
HEMOGLOBIN: 12.5 G/DL (ref 13–17)
IMMATURE GRANULOCYTES: 1 %
LYMPHOCYTES # BLD: 9 % (ref 24–43)
MCH RBC QN AUTO: 30.4 PG (ref 25.2–33.5)
MCHC RBC AUTO-ENTMCNC: 34 G/DL (ref 28.4–34.8)
MCV RBC AUTO: 89.5 FL (ref 82.6–102.9)
MONOCYTES # BLD: 11 % (ref 3–12)
NRBC AUTOMATED: 0 PER 100 WBC
PDW BLD-RTO: 13 % (ref 11.8–14.4)
PLATELET # BLD: 242 K/UL (ref 138–453)
PLATELET ESTIMATE: ABNORMAL
PMV BLD AUTO: 10.1 FL (ref 8.1–13.5)
RBC # BLD: 4.11 M/UL (ref 4.21–5.77)
RBC # BLD: ABNORMAL 10*6/UL
SEG NEUTROPHILS: 79 % (ref 36–65)
SEGMENTED NEUTROPHILS ABSOLUTE COUNT: 8.74 K/UL (ref 1.5–8.1)
WBC # BLD: 11 K/UL (ref 3.5–11.3)
WBC # BLD: ABNORMAL 10*3/UL

## 2021-09-26 PROCEDURE — 6370000000 HC RX 637 (ALT 250 FOR IP): Performed by: FAMILY MEDICINE

## 2021-09-26 PROCEDURE — 85025 COMPLETE CBC W/AUTO DIFF WBC: CPT

## 2021-09-26 PROCEDURE — 36415 COLL VENOUS BLD VENIPUNCTURE: CPT

## 2021-09-26 PROCEDURE — 6370000000 HC RX 637 (ALT 250 FOR IP): Performed by: NURSE PRACTITIONER

## 2021-09-26 PROCEDURE — 94761 N-INVAS EAR/PLS OXIMETRY MLT: CPT

## 2021-09-26 PROCEDURE — 2580000003 HC RX 258: Performed by: NURSE PRACTITIONER

## 2021-09-26 RX ADMIN — LISINOPRIL 10 MG: 10 TABLET ORAL at 08:04

## 2021-09-26 RX ADMIN — OXYCODONE HYDROCHLORIDE AND ACETAMINOPHEN 1000 MG: 500 TABLET ORAL at 08:05

## 2021-09-26 RX ADMIN — NIFEDIPINE 30 MG: 30 TABLET, EXTENDED RELEASE ORAL at 08:04

## 2021-09-26 RX ADMIN — APIXABAN 10 MG: 5 TABLET, FILM COATED ORAL at 08:05

## 2021-09-26 RX ADMIN — FAMOTIDINE 20 MG: 20 TABLET, FILM COATED ORAL at 08:04

## 2021-09-26 RX ADMIN — Medication 100 MG: at 08:05

## 2021-09-26 RX ADMIN — SODIUM CHLORIDE, PRESERVATIVE FREE 10 ML: 5 INJECTION INTRAVENOUS at 08:06

## 2021-09-26 ASSESSMENT — PAIN SCALES - GENERAL
PAINLEVEL_OUTOF10: 0
PAINLEVEL_OUTOF10: 0

## 2021-09-26 NOTE — DISCHARGE INSTR - DIET

## 2021-09-26 NOTE — PROGRESS NOTES
Progress Note  Brandon Santos MD    OBJECTIVE:    Patient seen for f/u of Pulmonary embolism associated with COVID-19 ([address] He feels better     ROS:   Constitutional: negative  for fevers, and negative for chills. Respiratory: negative for shortness of breath, negative for cough, and negative for wheezing  Cardiovascular: negative for chest pain, and negative for palpitations  Gastrointestinal: negative for abdominal pain, negative for nausea,negative for vomiting, negative for diarrhea, and negative for constipation     All other systems were reviewed with the patient and are negative unless otherwise stated in HPI    OBJECTIVE:  Vitals:   Temp: 97.7 °F (36.5 °C)  BP: 137/87  Resp: 16  Pulse: 58  SpO2: 93 %    24HR INTAKE/OUTPUT:      Intake/Output Summary (Last 24 hours) at 9/26/2021 1034  Last data filed at 9/26/2021 0847  Gross per 24 hour   Intake 1700 ml   Output 800 ml   Net 900 ml     -----------------------------------------------------------------  Exam:  GEN:    Awake, alert and oriented x3. EYES:  EOMI, pupils equal   NECK: Supple. No lymphadenopathy. No carotid bruit  CVS:    regular rate and rhythm, no audible murmur  PULM:  CTA, no wheezes, rales or rhonchi, no acute respiratory distress  ABD:    Bowels sounds normal.  Abdomen is soft. No distention. no tenderness to palpation. EXT:   no edema bilaterally . No calf tenderness. NEURO: Moves all extremities. Motor and sensory are grossly intact  SKIN:  No rashes.   No skin lesions.    -----------------------------------------------------------------  Diagnostic Data:    · All available data reviewed  Lab Results   Component Value Date    WBC 11.0 09/26/2021    HGB 12.5 (L) 09/26/2021    MCV 89.5 09/26/2021     09/26/2021      Lab Results   Component Value Date    GLUCOSE 119 (H) 09/25/2021    BUN 14 09/25/2021    CREATININE 0.66 (L) 09/25/2021     09/25/2021    K 3.9 09/25/2021    CALCIUM 9.0 09/25/2021     09/25/2021 CO2 23 09/25/2021       PROBLEM LIST:  Principal Problem:    Pulmonary embolism associated with COVID-19 ([address]  Active Problems:    Hypertension    Dyspnea on exertion    Deep vein thrombosis of right lower extremity (HCC)    Morbid obesity (HCC)  Resolved Problems:    * No resolved hospital problems. *      ASSESSMENT / PLAN:  Pulmonary embolism associated with COVID-19 ([address]  · Continue current therapy anticoagulation  ·   · Id consult apprciated- Remdezivir discontinued. · Nutrition status:  Well developed, well nourished with no malnutrition  · DVT prophylaxis: eliquis  · High risk medications: none   · Disposition:  Discharge plan is home    Angel Ventura MD , M.D.  9/26/2021  10:34 AM

## 2021-09-26 NOTE — PROGRESS NOTES
Pt. Discharged in stable condition. Discharge teaching and instructions completed with patient using teachback method. AVS reviewed. Patient voiced understanding regarding prescriptions, follow up appointments, and care of self at home.  Discharged in a wheelchair to  home with support per family

## 2021-09-26 NOTE — PROGRESS NOTES
Writer left voicemail for Dr. Vivian Sheppard, infectious disease, to call back regarding new lab results.

## 2021-09-26 NOTE — DISCHARGE SUMMARY
Physician Discharge Summary  Brandon Santos MD       Patient ID:  Shawn Sierra  [id number]  1970    Admission date: 9/24/2021    Discharge date: 9/26/2021     Admitting Physician: Maude Tuttle MD     Primary Care Physician: Evelin De Leon DO     Primary Discharge Diagnoses:   Patient Active Problem List    Diagnosis Date Noted    Dyspnea on exertion 09/24/2021    Pulmonary embolism associated with COVID-19 ([facility] [address] 09/24/2021    Deep vein thrombosis of right lower extremity ([facility] [address] 09/24/2021    Morbid obesity ([facility] [address] 09/24/2021    Slow transit constipation 07/21/2021    Asymptomatic bacteriuria 07/20/2021    Hypertension     Pancreatitis, gallstone 07/18/2021       Additional Diagnoses:       Diagnosis Date    Bacterial pneumonia 2000    Hypertension     Pancreatitis 07/18/2021         Review of Systems:  Constitutional: negative for fevers or chills  Eyes: negative for visual disturbance   ENT: negative for sore throat or nasal congestion  Respiratory: negative for shortness of breath or cough  Cardiovascular: negative for chest pain ,palpitations,pnd,syncope  Gastrointestinal: negative for abd pain, nausea, vomiting, diarrhea , constipation,hemetemesis,efrain,blood in stool  Genitourinary: negative for dysuria, urgency ,frequency,hematuria  Integument/breast: negative for skin rash or lesions  Neurological: negative for unilateral weakness, numbness or tingling. Skeletal Muscular: no joint pain,jont swelling,back pain              Physical exam:      -----------------------------------------------------------------  Exam:  GEN:   A & O x3, no apparent distress  EYES: No gross abnormalities.   NECK: normal, supple, no lymphadenopathy,  no carotid bruits  PULM: clear to auscultation bilaterally- no wheezes, rales or rhonchi, normal air movement, no respiratory distress  COR: regular rate & rhythm, no murmurs and no gallops  ABD:  soft, non-tender, non-distended, normal bowel sounds, no masses or organomegaly  EXT:   no cyanosis, clubbing or edema present    NEURO: negative  SKIN:  no rashes or significant lesions  -----------------------------------------------------------------          Hospital Course: The patient was admitted for the above. He was treated with iv lovenox, steroids and remdezevir initially. ID consult obtained and his steroids,remdezevir discontinued. He   improved over the course of his hospitalization and discherged on eliquis orally. He is to follow up with Dr Pastora Huston as out pt next wk    Consultants:    · Infectious disease    Procedures:    · none    Complications:   · none    Significant Diagnostic Studies:   XR CHEST PORTABLE    Result Date: 9/24/2021  EXAMINATION: ONE XRAY VIEW OF THE CHEST 9/24/2021 12:53 pm COMPARISON: July 18, 2021 HISTORY: ORDERING SYSTEM PROVIDED HISTORY: dyspnea TECHNOLOGIST PROVIDED HISTORY: dyspnea FINDINGS: Stable cardiomegaly with mild interstitial prominence could indicate pulmonary edema or interstitial pneumonia. No large pleural effusion or pneumothorax. Mediastinum unremarkable. Osseous structures grossly intact. Cardiomegaly with questionable mild congestive failure or interstitial pneumonia. CT CHEST PULMONARY EMBOLISM W CONTRAST    Result Date: 9/24/2021  EXAMINATION: CTA OF THE CHEST 9/24/2021 2:00 pm TECHNIQUE: CTA of the chest was performed after the administration of intravenous contrast.  Multiplanar reformatted images are provided for review. MIP images are provided for review. Dose modulation, iterative reconstruction, and/or weight based adjustment of the mA/kV was utilized to reduce the radiation dose to as low as reasonably achievable. COMPARISON: None.  HISTORY: ORDERING SYSTEM PROVIDED HISTORY: sob with exertion TECHNOLOGIST PROVIDED HISTORY: sob with exertion Decision Support Exception - unselect if not a suspected or confirmed emergency medical condition->Emergency Medical Condition (MA) FINDINGS: Pulmonary Arteries: CBC Auto Differential    Collection Time: 09/24/21  1:05 PM   Result Value Ref Range    WBC 8.4 3.5 - 11.3 k/uL    RBC 4.49 4.21 - 5.77 m/uL    Hemoglobin 14.0 13.0 - 17.0 g/dL    Hematocrit 41.0 40.7 - 50.3 %    MCV 91.3 82.6 - 102.9 fL    MCH 31.2 25.2 - 33.5 pg    MCHC 34.1 28.4 - 34.8 g/dL    RDW 12.8 11.8 - 14.4 %    Platelets 451 501 - 597 k/uL    MPV 9.6 8.1 - 13.5 fL    NRBC Automated 0.0 0.0 per 100 WBC    Differential Type NOT REPORTED     Seg Neutrophils 78 (H) 36 - 65 %    Lymphocytes 9 (L) 24 - 43 %    Monocytes 13 (H) 3 - 12 %    Eosinophils % 0 (L) 1 - 4 %    Basophils 0 0 - 2 %    Immature Granulocytes 0 0 %    Segs Absolute 6.55 1.50 - 8.10 k/uL    Absolute Lymph # 0.71 (L) 1.10 - 3.70 k/uL    Absolute Mono # 1.05 0.10 - 1.20 k/uL    Absolute Eos # <0.03 0.00 - 0.44 k/uL    Basophils Absolute <0.03 0.00 - 0.20 k/uL    Absolute Immature Granulocyte <0.03 0.00 - 0.30 k/uL    WBC Morphology NOT REPORTED     RBC Morphology NOT REPORTED     Platelet Estimate NOT REPORTED    Troponin    Collection Time: 09/24/21  1:05 PM   Result Value Ref Range    Troponin, High Sensitivity 17 0 - 22 ng/L    Troponin T NOT REPORTED <0.03 ng/mL    Troponin Interp NOT REPORTED    D-Dimer, Quantitative    Collection Time: 09/24/21  1:05 PM   Result Value Ref Range    D-Dimer, Quant 4.10 (H) 0.00 - 0.59 mg/L FEU   EKG 12 Lead    Collection Time: 09/24/21  2:17 PM   Result Value Ref Range    Ventricular Rate 100 BPM    Atrial Rate 100 BPM    P-R Interval 190 ms    QRS Duration 110 ms    Q-T Interval 386 ms    QTc Calculation (Bazett) 497 ms    P Axis 90 degrees    R Axis -38 degrees    T Axis 84 degrees   CBC auto differential    Collection Time: 09/25/21  6:03 AM   Result Value Ref Range    WBC 9.1 3.5 - 11.3 k/uL    RBC 4.22 4.21 - 5.77 m/uL    Hemoglobin 12.9 (L) 13.0 - 17.0 g/dL    Hematocrit 37.7 (L) 40.7 - 50.3 %    MCV 89.3 82.6 - 102.9 fL    MCH 30.6 25.2 - 33.5 pg    MCHC 34.2 28.4 - 34.8 g/dL    RDW 12.8 11.8 - 14.4 %    Platelets 416 637 - 265 k/uL    MPV 10.3 8.1 - 13.5 fL    NRBC Automated 0.0 0.0 per 100 WBC    Differential Type NOT REPORTED     Seg Neutrophils 74 (H) 36 - 65 %    Lymphocytes 11 (L) 24 - 43 %    Monocytes 15 (H) 3 - 12 %    Eosinophils % 0 (L) 1 - 4 %    Basophils 0 0 - 2 %    Immature Granulocytes 0 0 %    Segs Absolute 6.74 1.50 - 8.10 k/uL    Absolute Lymph # 0.98 (L) 1.10 - 3.70 k/uL    Absolute Mono # 1.38 (H) 0.10 - 1.20 k/uL    Absolute Eos # <0.03 0.00 - 0.44 k/uL    Basophils Absolute <0.03 0.00 - 0.20 k/uL    Absolute Immature Granulocyte <0.03 0.00 - 0.30 k/uL    WBC Morphology NOT REPORTED     RBC Morphology NOT REPORTED     Platelet Estimate NOT REPORTED    Comprehensive Metabolic Panel w/ Reflex to MG    Collection Time: 09/25/21  6:03 AM   Result Value Ref Range    Glucose 119 (H) 70 - 99 mg/dL    BUN 14 6 - 20 mg/dL    CREATININE 0.66 (L) 0.70 - 1.20 mg/dL    Bun/Cre Ratio 21 (H) 9 - 20    Calcium 9.0 8.6 - 10.4 mg/dL    Sodium 135 135 - 144 mmol/L    Potassium 3.9 3.7 - 5.3 mmol/L    Chloride 100 98 - 107 mmol/L    CO2 23 20 - 31 mmol/L    Anion Gap 12 9 - 17 mmol/L    Alkaline Phosphatase 82 40 - 129 U/L    ALT 18 5 - 41 U/L    AST 11 <40 U/L    Total Bilirubin 1.14 0.3 - 1.2 mg/dL    Total Protein 7.0 6.4 - 8.3 g/dL    Albumin 3.7 3.5 - 5.2 g/dL    Albumin/Globulin Ratio 1.1 1.0 - 2.5    GFR Non-African American >60 >60 mL/min    GFR African American >60 >60 mL/min    GFR Comment          GFR Staging         Ferritin    Collection Time: 09/25/21  6:03 AM   Result Value Ref Range    Ferritin 645 (H) 30 - 400 ug/L   C-reactive protein    Collection Time: 09/25/21  6:03 AM   Result Value Ref Range    .6 (H) 0.0 - 5.0 mg/L   CBC auto differential    Collection Time: 09/26/21  6:19 AM   Result Value Ref Range    WBC 11.0 3.5 - 11.3 k/uL    RBC 4.11 (L) 4.21 - 5.77 m/uL    Hemoglobin 12.5 (L) 13.0 - 17.0 g/dL    Hematocrit 36.8 (L) 40.7 - 50.3 %    MCV 89.5 82.6 - 102.9 fL    MCH 30.4 25.2 - 33.5 pg    MCHC 34.0 28.4 - 34.8 g/dL    RDW 13.0 11.8 - 14.4 %    Platelets 344 234 - 631 k/uL    MPV 10.1 8.1 - 13.5 fL    NRBC Automated 0.0 0.0 per 100 WBC    Differential Type NOT REPORTED     Seg Neutrophils 79 (H) 36 - 65 %    Lymphocytes 9 (L) 24 - 43 %    Monocytes 11 3 - 12 %    Eosinophils % 0 (L) 1 - 4 %    Basophils 0 0 - 2 %    Immature Granulocytes 1 (H) 0 %    Segs Absolute 8.74 (H) 1.50 - 8.10 k/uL    Absolute Lymph # 0.99 (L) 1.10 - 3.70 k/uL    Absolute Mono # 1.23 (H) 0.10 - 1.20 k/uL    Absolute Eos # <0.03 0.00 - 0.44 k/uL    Basophils Absolute <0.03 0.00 - 0.20 k/uL    Absolute Immature Granulocyte 0.05 0.00 - 0.30 k/uL    WBC Morphology NOT REPORTED     RBC Morphology NOT REPORTED     Platelet Estimate NOT REPORTED      ·     Discharge Condition:   · stable    Disposition:   · home    Discharge Medications:     Samuel Rojas   Home Medication Instructions MSQ:[id number]    Printed on:09/26/21 1036   Medication Information                      apixaban (ELIQUIS) 5 MG TABS tablet  eliquis 10 mg bid for 6 days and then 5 mg po bid for 30 days             Cholecalciferol (VITAMIN D) 50 MCG (2000 UT) CAPS capsule  Take by mouth             lisinopril (PRINIVIL;ZESTRIL) 10 MG tablet  Take 10 mg by mouth 2 times daily             NIFEdipine (ADALAT CC) 30 MG extended release tablet  Take 1 tablet by mouth daily             terbinafine (LAMISIL) 250 MG tablet  Take 250 mg by mouth daily                 · Resume all home medications unless otherwise directed  · Add eliquis 10 mg bid for 6 days and then 5 mg po bid  · Stop taking NSAIDS    Patient Instructions:   · Activity: activity as tolerated  · Diet: regular diet  · Wound Care: none needed  · Other:   · Follow up with Dr Jerman Dougherty in 1 wk as directed      Time Spent on discharge services is 25 minutes in the examination, evaluation, counseling and review of medications and discharge plan.     Signed:  Perri Navarro MD, M.D.  9/26/2021  10:39 AM

## 2021-09-27 ENCOUNTER — CARE COORDINATION (OUTPATIENT)
Dept: CASE MANAGEMENT | Age: 51
End: 2021-09-27

## 2021-10-04 ENCOUNTER — CARE COORDINATION (OUTPATIENT)
Dept: CASE MANAGEMENT | Age: 51
End: 2021-10-04

## 2021-10-04 NOTE — CARE COORDINATION
[facility]  Transitions Follow Up Call    10/4/2021    Patient: Blaine Rivas  Patient : 1970   MRN: [id number]  Reason for Admission: DVT Rt leg/bilateral PE/Covid-19 +  Discharge Date: 21 RARS: Readmission Risk Score: 12         Spoke with: Blaine Rivas    Was able to contact [facility] [employer]. He stated that she was doing well. He said that his breathing was not quite back to his baseline and has pain in the Lt back/rib area. He said that he just returned to work. Denied any other symptoms. Patient contacted regarding COVID-19 diagnosis. Discussed COVID-19 related testing which was available at this time. Test results were positive. Patient informed of results, if available? Yes    Care Transition Nurse contacted the patient by telephone to perform follow-up assessment. Verified name and  with patient as identifiers. Patient has following risk factors of: obesity. Symptoms reviewed with patient who verbalized the following symptoms: shortness of breath, no new symptoms and no worsening symptoms. Due to no new or worsening symptoms encounter was not routed to provider for escalation. Educated patient about risk for severe COVID-19 due to risk factors according to CDC guidelines. CTN reviewed discharge instructions, medical action plan and red flag symptoms with the patient who verbalized understanding. Discussed COVID vaccination status: No. Education provided on COVID-19 vaccination as appropriate. Discussed exposure protocols and quarantine with CDC Guidelines. Patient was given an opportunity to verbalize any questions and concerns and agrees to contact CTN or health care provider for questions related to their healthcare. Was patient discharged with a pulse oximeter? No Discussed and confirmed pulse oximeter discharge instructions and when to notify provider or seek emergency care. CTN provided contact information.  Plan for follow-up call in 5-7 days based on severity of symptoms and risk factors. Care Transitions Subsequent and Final Call    Subsequent and Final Calls  Care Transitions Interventions  Other Interventions: Follow Up  No future appointments.     Keith Phoenix RN

## 2021-10-12 ENCOUNTER — CARE COORDINATION (OUTPATIENT)
Dept: CASE MANAGEMENT | Age: 51
End: 2021-10-12

## 2021-10-12 NOTE — CARE COORDINATION
[facility] Transitions Follow Up Call    10/12/2021    Patient: Kandy Salamanca  Patient : 1970   MRN: [id number]  Reason for Admission: DVT Rt leg/bilateral PE/Covid-19 +  Discharge Date: 21 RARS: Readmission Risk Score: 12         Spoke with: Kandy Salamanca    Was able to contact [facility] Leroy for [employer]. He stated that he was doing \"fine\". He had no complaints or concerns. He is still working and will be following up with PCP shortly. Patient contacted regarding COVID-19 diagnosis. Discussed COVID-19 related testing which was available at this time. Test results were positive. Patient informed of results, if available? Yes     Care Transition Nurse contacted the patient by telephone to perform follow-up assessment. Verified name and  with patient as identifiers. Patient has following risk factors of: obesity.        Symptoms reviewed with patient who verbalized the following symptoms: shortness of breath, no new symptoms and no worsening symptoms. Due to no new or worsening symptoms encounter was not routed to provider for escalation. Educated patient about risk for severe COVID-19 due to risk factors according to CDC guidelines. CTN reviewed discharge instructions, medical action plan and red flag symptoms with the patient who verbalized understanding. Discussed COVID vaccination status: No. Education provided on COVID-19 vaccination as appropriate. Discussed exposure protocols and quarantine with CDC Guidelines. Patient was given an opportunity to verbalize any questions and concerns and agrees to contact CTN or health care provider for questions related to their healthcare.     Was patient discharged with a pulse oximeter? No Discussed and confirmed pulse oximeter discharge instructions and when to notify provider or seek emergency care.     CTN provided contact information. No further outreaches. Episode ended.     Care Transitions Subsequent and Final Call    Subsequent and Final Calls  Care Transitions Interventions  Other Interventions: Follow Up  No future appointments.     Luciana Aase, RN

## 2021-11-20 ENCOUNTER — HOSPITAL ENCOUNTER (EMERGENCY)
Age: 51
Discharge: HOME | End: 2021-11-20
Attending: EMERGENCY MEDICINE
Payer: COMMERCIAL

## 2021-11-20 VITALS
SYSTOLIC BLOOD PRESSURE: 162 MMHG | TEMPERATURE: 97.2 F | DIASTOLIC BLOOD PRESSURE: 103 MMHG | HEIGHT: 73 IN | BODY MASS INDEX: 41.75 KG/M2 | RESPIRATION RATE: 18 BRPM | OXYGEN SATURATION: 99 % | HEART RATE: 69 BPM | WEIGHT: 315 LBS

## 2021-11-20 DIAGNOSIS — I82.4Y1 DEEP VEIN THROMBOSIS (DVT) OF PROXIMAL VEIN OF RIGHT LOWER EXTREMITY, UNSPECIFIED CHRONICITY (HCC): ICD-10-CM

## 2021-11-20 DIAGNOSIS — M79.604 LOWER EXTREMITY PAIN, RIGHT: ICD-10-CM

## 2021-11-20 DIAGNOSIS — S76.111A QUADRICEPS STRAIN, RIGHT, INITIAL ENCOUNTER: Primary | ICD-10-CM

## 2021-11-20 PROCEDURE — 99282 EMERGENCY DEPT VISIT SF MDM: CPT

## 2021-11-20 RX ORDER — CYCLOBENZAPRINE HCL 10 MG
10 TABLET ORAL 3 TIMES DAILY PRN
Qty: 21 TABLET | Refills: 0 | Status: SHIPPED | OUTPATIENT
Start: 2021-11-20 | End: 2021-11-30

## 2021-11-20 RX ORDER — CYCLOBENZAPRINE HCL 10 MG
10 TABLET ORAL ONCE
Status: DISCONTINUED | OUTPATIENT
Start: 2021-11-20 | End: 2021-11-20 | Stop reason: HOSPADM

## 2021-11-20 ASSESSMENT — PAIN DESCRIPTION - LOCATION
LOCATION: LEG
LOCATION: LEG;KNEE

## 2021-11-20 ASSESSMENT — PAIN DESCRIPTION - ORIENTATION
ORIENTATION: RIGHT
ORIENTATION: RIGHT

## 2021-11-20 ASSESSMENT — PAIN DESCRIPTION - DESCRIPTORS
DESCRIPTORS: DISCOMFORT
DESCRIPTORS: DISCOMFORT

## 2021-11-20 ASSESSMENT — PAIN DESCRIPTION - PAIN TYPE
TYPE: ACUTE PAIN
TYPE: ACUTE PAIN

## 2021-11-20 ASSESSMENT — PAIN - FUNCTIONAL ASSESSMENT: PAIN_FUNCTIONAL_ASSESSMENT: 0-10

## 2021-11-20 ASSESSMENT — PAIN SCALES - GENERAL
PAINLEVEL_OUTOF10: 3
PAINLEVEL_OUTOF10: 4

## 2021-11-20 NOTE — ED PROVIDER NOTES
[facility] ED  EMERGENCY DEPARTMENT ENCOUNTER      Pt Name: Perla Mendes  MRN: [id number]  [place] 1970  Date of evaluation: 11/20/2021  Provider: Connie Paul MD    [address]       Chief Complaint   Patient presents with    Leg Pain     right leg pain since monday, hx of DVT         HISTORY OF PRESENT ILLNESS   (Location/Symptom, Timing/Onset, Context/Setting, Quality, Duration, Modifying Factors, Severity)  Note limiting factors. Perla Mendes is a 46 y.o. male who presents to the emergency department      48yo male presented to the ED for evaluation of right thigh pain. Pain is anterior and aching. No edema. No injury. H/o DVT. No chest pain or SOB          Nursing Notes were reviewed. REVIEW OF SYSTEMS    (2-9 systems for level 4, 10 or more for level 5)     Review of Systems   All other systems reviewed and are negative. Except as noted above the remainder of the review of systems was reviewed and negative.        PAST MEDICAL HISTORY     Past Medical History:   Diagnosis Date    Bacterial pneumonia 2000    Hypertension     Pancreatitis 07/18/2021         SURGICAL HISTORY       Past Surgical History:   Procedure Laterality Date    CHOLECYSTECTOMY  07/21/2021    Dr. Michael Mason - laparoscopic, robotic    CHOLECYSTECTOMY, LAPAROSCOPIC N/A 7/21/2021    CHOLECYSTECTOMY LAPAROSCOPIC ROBOTIC performed by Guero Tobin MD at [facility]       Discharge Medication List as of 11/20/2021  5:31 AM      CONTINUE these medications which have NOT CHANGED    Details   apixaban (ELIQUIS) 5 MG TABS tablet eliquis 10 mg bid for 6 days and then 5 mg po bid for 30 days, Disp-72 tablet, R-0Normal      Cholecalciferol (VITAMIN D) 50 MCG (2000 UT) CAPS capsule Take by mouthHistorical Med      NIFEdipine (ADALAT CC) 30 MG extended release tablet Take 1 tablet by mouth daily, Disp-30 tablet, R-3Normal      lisinopril (PRINIVIL;ZESTRIL) 10 MG file    Physically Abused: Not on file    Sexually Abused: Not on file   Housing Stability:     Unable to Pay for Housing in the Last Year: Not on file    Number of [place] in the Last Year: Not on file    Unstable Housing in the Last Year: Not on file       SCREENINGS        [place] Coma Scale  Eye Opening: Spontaneous  Best Verbal Response: Oriented  Best Motor Response: Obeys commands  [place] Coma Scale Score: 15               PHYSICAL EXAM    (up to 7 for level 4, 8 or more for level 5)     ED Triage Vitals   BP Temp Temp Source Pulse Resp SpO2 Height Weight   11/20/21 0351 11/20/21 0347 11/20/21 0347 11/20/21 0347 11/20/21 0347 11/20/21 0347 11/20/21 0347 11/20/21 0347   (!) 184/105 97.2 °F (36.2 °C) Tympanic 82 18 99 % 6' 1\" (1.854 m) (!) 330 lb (149.7 kg)       Physical Exam  Vitals and nursing note reviewed. Constitutional:       General: He is not in acute distress. Appearance: He is not toxic-appearing. Cardiovascular:      Rate and Rhythm: Normal rate and regular rhythm. Pulmonary:      Effort: Pulmonary effort is normal. No respiratory distress. Breath sounds: Normal breath sounds. Musculoskeletal:         General: Tenderness present. No swelling. Comments: Right anterior thigh tenderness   Neurological:      General: No focal deficit present. Mental Status: He is alert and oriented to person, place, and time.          DIAGNOSTIC RESULTS     EKG: All EKG's are interpreted by the Emergency Department Physician who either signs or Co-signs this chart in the absence of a cardiologist.        RADIOLOGY:   Non-plain film images such as CT, Ultrasound and MRI are read by the radiologist. Plain radiographic images are visualized and preliminarily interpreted by the emergency physician with the below findings:        Interpretation per the Radiologist below, if available at the time of this note:    US DUP LOWER EXTREMITY RIGHT SHANE    (Results Pending)         ED BEDSIDE tablet, R-0Normal           Controlled Substances Monitoring:     No flowsheet data found.     (Please note that portions of this note were completed with a voice recognition program.  Efforts were made to edit the dictations but occasionally words are mis-transcribed.)    Maximo William MD (electronically signed)  Attending Emergency Physician             Maximo William MD  11/24/21 2124

## 2021-11-26 ENCOUNTER — HOSPITAL ENCOUNTER (OUTPATIENT)
Dept: VASCULAR LAB | Age: 51
Discharge: HOME | End: 2021-11-28
Payer: COMMERCIAL

## 2021-11-26 DIAGNOSIS — M79.604 LOWER EXTREMITY PAIN, RIGHT: ICD-10-CM

## 2021-11-26 DIAGNOSIS — I82.4Y1 DEEP VEIN THROMBOSIS (DVT) OF PROXIMAL VEIN OF RIGHT LOWER EXTREMITY, UNSPECIFIED CHRONICITY (HCC): ICD-10-CM

## 2021-11-26 PROCEDURE — 93971 EXTREMITY STUDY: CPT

## 2022-04-25 ENCOUNTER — OFFICE VISIT (OUTPATIENT)
Dept: PULMONOLOGY | Age: 52
End: 2022-04-25
Payer: COMMERCIAL

## 2022-04-25 VITALS
HEART RATE: 78 BPM | OXYGEN SATURATION: 98 % | BODY MASS INDEX: 41.75 KG/M2 | TEMPERATURE: 97.6 F | WEIGHT: 315 LBS | RESPIRATION RATE: 16 BRPM | SYSTOLIC BLOOD PRESSURE: 151 MMHG | HEIGHT: 73 IN | DIASTOLIC BLOOD PRESSURE: 91 MMHG

## 2022-04-25 DIAGNOSIS — I26.99 PULMONARY EMBOLISM ASSOCIATED WITH COVID-19 (HCC): Primary | ICD-10-CM

## 2022-04-25 DIAGNOSIS — G47.33 OSA (OBSTRUCTIVE SLEEP APNEA): ICD-10-CM

## 2022-04-25 DIAGNOSIS — U07.1 PULMONARY EMBOLISM ASSOCIATED WITH COVID-19 (HCC): Primary | ICD-10-CM

## 2022-04-25 PROCEDURE — 99204 OFFICE O/P NEW MOD 45 MIN: CPT | Performed by: INTERNAL MEDICINE

## 2022-04-25 RX ORDER — RIVAROXABAN 20 MG/1
TABLET, FILM COATED ORAL
COMMUNITY
Start: 2022-02-25

## 2022-04-25 ASSESSMENT — SLEEP AND FATIGUE QUESTIONNAIRES
HOW LIKELY ARE YOU TO NOD OFF OR FALL ASLEEP WHEN YOU ARE A PASSENGER IN A CAR FOR AN HOUR WITHOUT A BREAK: 2
HOW LIKELY ARE YOU TO NOD OFF OR FALL ASLEEP WHILE WATCHING TV: 2
HOW LIKELY ARE YOU TO NOD OFF OR FALL ASLEEP IN A CAR, WHILE STOPPED FOR A FEW MINUTES IN TRAFFIC: 0
ESS TOTAL SCORE: 6
HOW LIKELY ARE YOU TO NOD OFF OR FALL ASLEEP WHILE SITTING INACTIVE IN A PUBLIC PLACE: 0
HOW LIKELY ARE YOU TO NOD OFF OR FALL ASLEEP WHILE SITTING AND TALKING TO SOMEONE: 0
HOW LIKELY ARE YOU TO NOD OFF OR FALL ASLEEP WHILE LYING DOWN TO REST IN THE AFTERNOON WHEN CIRCUMSTANCES PERMIT: 1
HOW LIKELY ARE YOU TO NOD OFF OR FALL ASLEEP WHILE SITTING AND READING: 1
HOW LIKELY ARE YOU TO NOD OFF OR FALL ASLEEP WHILE SITTING QUIETLY AFTER LUNCH WITHOUT ALCOHOL: 0

## 2022-04-25 NOTE — PROGRESS NOTES
PULMONARY MEDICINE OUTPATIENT  CONSULT NOTE                                                                       PATIENT:  Barbara Pollard  YOB: 1970  MRN: [id number]     REFERRED BY: Reyna Vasquez DO   CHIEF COMPLIANT: Pulmonary Embolism (New patient ref. for evaluation and tx. .. reports COVID +9/21 and PE and right leg clott. ...denies any resp. problems)       HISTORY     HISTORY OF PRESENT ILLNESS:   Barbara Pollard is a 46y.o. year old male here for [facility] care    Patient is a non-smoker. He is morbidly obese. He reports history of COVID-19 infection in September 2021, following which he was diagnosed to have right lower extremity DVT and pulmonary embolism that required hospitalization for 2 days. Patient also reports taking a long road trip around the same time. He also had gallbladder surgery in July 2021. He was discharged on Eliquis. Currently patient is on Xarelto secondary to insurance coverage. No bleeding reported. He reports great exertional dyspnea. Denies any other pulmonary symptoms. He works third shift. Reports snoring. However has not had any testing for sleep apnea. PAST MEDICAL HISTORY:      Diagnosis Date    Bacterial pneumonia 2000    Hypertension     Pancreatitis 07/18/2021     PAST SURGICAL HISTORY:      Procedure Laterality Date    CHOLECYSTECTOMY  07/21/2021    Dr. Parth Leblanc - laparoscopic, robotic    CHOLECYSTECTOMY, LAPAROSCOPIC N/A 7/21/2021    CHOLECYSTECT[facility]  TOBACCO:   reports that he has never smoked. He has never used smokeless tobacco.  ETOH:   reports current alcohol use of about 2.0 standard drinks of alcohol per week. DRUGS: reports no history of drug use.      AVOCATION/OCCUPATIONAL EXPOSURE:  [] Asbestos      [] Hot tub  [] Silica dust    [] Pets  [] Coal            [] Exotic birds  [] Foundry       [] Tuberculosis  [] Cameron Gallo         [] Others  [] [place]          PHYSICAL EXAMINATION      VITAL SIGNS:   BP (!) 151/91   Pulse 78   Temp 97.6 °F (36.4 °C)   Resp 16   Ht 6' 1\" (1.854 m)   Wt (!) 340 lb 14.4 oz (154.6 kg)   SpO2 98%   BMI 44.98 kg/m²   Wt Readings from Last 3 Encounters:   04/25/22 (!) 340 lb 14.4 oz (154.6 kg)   11/20/21 (!) 330 lb (149.7 kg)   09/26/21 (!) 326 lb 3.2 oz (148 kg)     SYSTEMIC EXAMINATION:   General appearance -  [x] well appearing  [] overweight  [x] morbidly obese [] cachectic [x] comfortable  [x] in no acute distress  [] chronically ill-appearing  [] in mild to moderate respiratory distress   Mental status -  [x] alert  [x] oriented to person, place, and time  [] anxious  [] depressed mood    Head-  [x] atraumatic  [x] normocephalic   Eyes -  [] pupils equal and reactive, extraocular eye movements intact  [] sclera anicteric   Ears -  [x] hearing grossly normal bilaterally [] bilateral TM's and external ear canals normal   Nose -  [x] normal and patent [] no erythema  [] discharge   Mouth -  [x] mucous membranes moist  [] pharynx normal without lesions [] erythematous  [] exudate noted   Mallampati Airway Score -  [] I (soft palate, uvula, fauces, tonsillar pillars visible) [] II (soft palate, uvula, fauces visible) []  III (soft palate, base of uvula visible) [] IV (only hard palate visible)   Neck -  [] supple  [] no significant adenopathy  [] carotids upstroke normal bilaterally [] no JVD  [] no bruit   Lymphatics -  [x] no palpable lymphadenopathy  [] no hepatosplenomegaly   Chest -   [x] normal chest excursion  [x] no chest wall tenderness  [] increased AP diameter[] pectus noted [] scoliosis noted [x] no tachypnea retraction or cyanosis [x] clear to auscultation, no wheezes, rales or rhonchi, symmetric air entry  [] wheezing noted  [] rales noted  []rhonchi noted [] decreased air entry noted bilaterally   Heart -  [x] normal rate,  [x] regular rhythm  [] irregularly irregular rhythm [x] normal S1  [x] normal S2  [x] no murmurs, rubs, clicks or gallops  [] S3 present  [] S4 present  [] systolic murmur  [] diastolic murmur  [] midsystolic click []pericardial rub present    Abdomen -  [] soft [] nontender  [] nondistended  [] no masses or organomegaly   Neurological -  [x] normal speech  [x] no focal findings or movement disorder noted  [] cranial nerves II through XII intact  [] DTR's normal and symmetric  [] Babinski sign negative,  [x] motor and sensory grossly normal bilaterally  [] normal muscle tone [x] no tremors  [] strength 5/5  [] Romberg sign negative [] normal gait    Musculoskeletal -  [x] no joint tenderness [x] no deformity or swelling   Extremities - [x] no clubbing or cyanosis,  [x] no pedal edema [] pedal edema  [] intact peripheral pulses   Skin -  [x] normal coloration and turgor  [x] no rashes  [] no suspicious skin lesions noted          MEDICAL DECISION MAKING     1. PROBLEMS ADDRESSED (NUMBER AND COMPLEXITY)       ICD-10-CM    1. Pulmonary embolism associated with COVID-19 ([facility] [address]  U07.1     I26.99    2. COLLETTE (obstructive sleep apnea)  G47.33 Baseline Diagnostic Sleep Study     Sleep Study with PAP Titration        2.  DATA REVIEWED AND ANALYZED (AMOUNT AND/OR COMPLEXITY)   LABS:  ABG:   No results found for: PH, PHART, PCO2, OPR5ZDC, PO2, PO2ART, HCO3, IWV2RFT, BE, BEART, THGB, X4FEXWVD  VBG:  No results found for: PHVEN, SMD9EEY, BEVEN, I9ZTSFGI  CBC:   Lab Results   Component Value Date    WBC 11.0 09/26/2021    RBC 4.11 (L) 09/26/2021    HGB 12.5 (L) 09/26/2021    HCT 36.8 (L) 09/26/2021     09/26/2021    MCV 89.5 09/26/2021    MCH 30.4 09/26/2021    MCHC 34.0 09/26/2021    RDW 13.0 09/26/2021    LYMPHOPCT 9 (L) 09/26/2021    MONOPCT 11 09/26/2021    BASOPCT 0 09/26/2021    MONOSABS 1.23 (H) 09/26/2021    LYMPHSABS 0.99 (L) 09/26/2021    EOSABS <0.03 09/26/2021    BASOSABS <0.03 09/26/2021    DIFFTYPE NOT REPORTED 09/26/2021     Eosinophils/IgE: Lab Results   Component Value Date    EOSABS <0.03 09/26/2021     CMP:   Lab Results   Component Value Date     09/25/2021    K 3.9 09/25/2021     09/25/2021    CO2 23 09/25/2021    BUN 14 09/25/2021    CREATININE 0.66 (L) 09/25/2021    GLUCOSE 119 (H) 09/25/2021    CALCIUM 9.0 09/25/2021    PROT 7.0 09/25/2021    LABALBU 3.7 09/25/2021    BILITOT 1.14 09/25/2021    ALT 18 09/25/2021    AST 11 09/25/2021    ALKPHOS 82 09/25/2021    AMYLASE 67 07/18/2021    LIPASE 10 (L) 07/22/2021     Coagulation Profile:   No results found for: INR, PROTIME, APTT  BNP:   Lab Results   Component Value Date    PROBNP 448 (H) 09/24/2021     D-Dimer/Fibrinogen:  Lab Results   Component Value Date    DDIMER 4.10 (H) 09/24/2021     Others labs:  No results found for: TSH, V3PPKHT, V2FXEIM, THYROIDAB, FT3, T4FREE  Lab Results   Component Value Date    .6 (H) 09/25/2021     Lab Results   Component Value Date    FERRITIN 645 (H) 09/25/2021     No results found for: SPEP, UPEP, PSA, CEA, , SI0346,   No results found for: RPR, HIV     RADIOLOGY:  [] Ordered  [] Unavailable  [x] Reviewed  No results found. Chest x-ray:  CT chest:  1. Acute pulmonary emboli in bilateral lower lobes as discussed above.  No   right heart strain. 2. Airspace consolidation posterior basal right lower lobe and left lower   lobe which may represent pneumonia and/or pulmonary infarct from emboli   mentioned above. PET scan:    ECHOCARDIOGRAM:  Results for orders placed during the hospital encounter of 07/18/21    Echocardiogram complete 2D with doppler with color    Narrative  [address]    Summary  Normal global left ventricular systolic function with an estimated ejection  fraction of >55%  Normal left ventricular size with moderate concentric left ventricular  hypertrophy. No significant wall motion abnormalities were seen. No significant valvular disease was seen.   Evidence of moderate (grade II) diastolic dysfunction is seen. PULMONARY FUNCTION TEST:  [] Ordered  [x] Unavailable  [] Reviewed  No results found for: FEV1, FVC, ZZS7GBC, TLC, DLCO     6 MINUTE WALK TEST:  [] Ordered  [x] Unavailable  [] Reviewed  No results found for this or any previous visit.     Distance Walk Predicted Distance LLN** Predicted % Duration (min) Duration of Stops Sec Benson Dyspnea Benson Fatigue               POLYSOMNOGRAM:  BASELINE SLEEP STUDY:   [x] Ordered  [] Unavailable  [] Reviewed    TITRATION STUDY:   [x] Ordered  [] Unavailable  [] Reviewed    CPAP/BIPAP COMPLIANCE DATA:   [] Unavailable  [] Reviewed    ASSESSMENT OF EXCESSIVE DAYTIME SLEEPINESS (BY INDEPENDENT HISTORIAN):  [place] Sleepiness Scale:  [] Not obtained  [x] Obtained  Sleep Medicine 4/25/2022   Sitting and reading 1   Watching TV 2   Sitting, inactive in a public place (e.g. a theatre or a meeting) 0   As a passenger in a car for an hour without a break 2   Lying down to rest in the afternoon when circumstances permit 1   Sitting and talking to someone 0   Sitting quietly after a lunch without alcohol 0   In a car, while stopped for a few minutes in traffic 0   Total score 6        PRIOR EXTERNAL NOTES:  [] Unavailable  [x] Reviewed    ORDERS PLACED:  Orders Placed This Encounter   Procedures    Baseline Diagnostic Sleep Study     Standing Status:   Future     Standing Expiration Date:   10/25/2022     Order Specific Question:   Adult or Pediatric     Answer:   Adult Study (>7 Years)     Order Specific Question:   Location For Sleep Study     Answer:   [facility]     Order Specific Question:   Select a sleep lab location     Answer:   [facility]    Sleep Study with PAP Titration     If needed based on baseline study     Standing Status:   Future     Standing Expiration Date:   10/25/2022     Order Specific Question:   Sleep Study Titration Type     Answer:   CPAP     Order Specific Question:   Location For Sleep Study     Answer:   [facility] Order Specific Question:   Select a sleep lab location     Answer:   [facility]        3. RISK OF COMPLICATIONS AND/OR MORBIDITY OR MORTALITY:     ALLERGIES:  No Known Allergies   MEDICATIONS:  Outpatient Medications Prior to Visit   Medication Sig Dispense Refill    XARELTO 20 MG TABS tablet TAKE 1 TABLET BY MOUTH DAILY      Ascorbic Acid (DENISE-C PO) Take by mouth      VITAMIN E PO Take by mouth      Cholecalciferol (VITAMIN D) 50 MCG (2000 UT) CAPS capsule Take by mouth      NIFEdipine (ADALAT CC) 30 MG extended release tablet Take 1 tablet by mouth daily 30 tablet 3    lisinopril (PRINIVIL;ZESTRIL) 10 MG tablet Take 10 mg by mouth 2 times daily      terbinafine (LAMISIL) 250 MG tablet Take 250 mg by mouth daily      apixaban (ELIQUIS) 5 MG TABS tablet eliquis 10 mg bid for 6 days and then 5 mg po bid for 30 days (Patient not taking: Reported on 4/25/2022) 72 tablet 0     No facility-administered medications prior to visit. PRESCRIPTION DRUG MANAGEMENT/RECOMMENDATIONS:   Reviewed hospitalization record and CT chest   He was diagnosed to have pulmonary embolism and left lower extremity DVT in September 2021, we seem to be temporally related to COVID-19 infection   Patient currently asymptomatic apart from grade 2 exertional dyspnea which he attributes to being obese   Patient has been on anticoagulation for more than 6 months   Will recommend discontinuing anticoagulation   Discussed potential risk for recurrence of clots    SLEEP DISORDER RECOMMENDATIONS:  Patient reports snoring and works in third shift  Polysomnogram ordered  We'll see the patient back after the sleep study  Weight loss recommended and discussed  Recommend good sleep hygiene and instructions provided  Patient not to drive or operate heavy machinery if sleepy    LIFESTYLE MODIFICATION RECOMMENDATIONS/COUNSELING:   Follow healthy behaviors: nutrition, exercise and medication adherence.    Maintain an active lifestyle. LUNG CANCER SCREENING/OTHER IMAGING RECOMMENDATIONS:  After reviewing the patient's smoking history, age and other clinical data, patient DOES NOT meet the following Low Dose CT (LDCT) Lung Screening criteria:    [] Age: Screening recommended if age range is 55-77(Medicare) or 50-80 ([facility])  [x] Pack-yr: Screening recommended if pack year smoking >30 (Medicare) or >20 ([facility])  [] Duration since quit: Screening recommended if still smoking or less than 15 year since quit  [] Lung cancer: Screening not recommended if there are sign or symptoms of lung cancer   [] Duration since last CT: Screening recommended if > 11 months since last LDCT          IMMUNIZATION HISTORY/RECOMMENDATIONS:    There is no immunization history on file for this patient. All the questions that the patient had were answered to his satisfaction  We'll see the patient back in three months  Thank you for having us involved in the care of your patient. Please call us if you have any questions or concerns. Barrington Harrison MD  Pulmonary and Critical Care Medicine            Please note that this chart was generated using voice recognition Dragon dictation software. Although every effort was made to ensure the accuracy of this automated transcription, some errors in transcription may have occurred.

## 2022-04-25 NOTE — PATIENT INSTRUCTIONS
SURVEY:    You may be receiving a survey from [employer] regarding your visit today. Please complete the survey to enable us to provide the highest quality of care to you and your family. If you cannot score us a very good on any question, please call the office to discuss how we could have made your experience a very good one. Thank you. Please recheck your blood pressure when you go home and make sure you take your prescribed medication if applicable . Please follow up with your PCP or ER if needed.

## 2023-03-15 ENCOUNTER — HOSPITAL ENCOUNTER (OUTPATIENT)
Age: 53
Discharge: HOME | End: 2023-03-15
Payer: COMMERCIAL

## 2023-03-15 PROCEDURE — 93005 ELECTROCARDIOGRAM TRACING: CPT | Performed by: FAMILY MEDICINE

## 2023-03-16 LAB
EKG ATRIAL RATE: 72 BPM
EKG P AXIS: 63 DEGREES
EKG P-R INTERVAL: 178 MS
EKG Q-T INTERVAL: 424 MS
EKG QRS DURATION: 116 MS
EKG QTC CALCULATION (BAZETT): 464 MS
EKG R AXIS: 0 DEGREES
EKG T AXIS: 94 DEGREES
EKG VENTRICULAR RATE: 72 BPM

## 2023-03-16 PROCEDURE — 93010 ELECTROCARDIOGRAM REPORT: CPT | Performed by: FAMILY MEDICINE

## 2023-03-17 ENCOUNTER — HOSPITAL ENCOUNTER (OUTPATIENT)
Age: 53
Discharge: HOME | End: 2023-03-17
Payer: COMMERCIAL

## 2023-03-17 ENCOUNTER — OFFICE VISIT (OUTPATIENT)
Dept: CARDIOLOGY | Age: 53
End: 2023-03-17

## 2023-03-17 VITALS
HEART RATE: 67 BPM | BODY MASS INDEX: 41.75 KG/M2 | HEIGHT: 73 IN | WEIGHT: 315 LBS | DIASTOLIC BLOOD PRESSURE: 103 MMHG | RESPIRATION RATE: 18 BRPM | OXYGEN SATURATION: 98 % | SYSTOLIC BLOOD PRESSURE: 189 MMHG

## 2023-03-17 DIAGNOSIS — R07.9 CHEST PAIN, UNSPECIFIED TYPE: ICD-10-CM

## 2023-03-17 DIAGNOSIS — E66.01 MORBID OBESITY WITH BODY MASS INDEX (BMI) OF 45.0 TO 49.9 IN ADULT (HCC): ICD-10-CM

## 2023-03-17 DIAGNOSIS — I10 PRIMARY HYPERTENSION: ICD-10-CM

## 2023-03-17 DIAGNOSIS — R06.02 SOB (SHORTNESS OF BREATH): ICD-10-CM

## 2023-03-17 DIAGNOSIS — I10 PRIMARY HYPERTENSION: Primary | ICD-10-CM

## 2023-03-17 DIAGNOSIS — G47.33 OSA (OBSTRUCTIVE SLEEP APNEA): ICD-10-CM

## 2023-03-17 LAB
ALBUMIN SERPL-MCNC: 4 G/DL (ref 3.5–5.2)
ALBUMIN/GLOBULIN RATIO: 1.3 (ref 1–2.5)
ALP SERPL-CCNC: 80 U/L (ref 40–129)
ALT SERPL-CCNC: 24 U/L (ref 5–41)
ANION GAP SERPL CALCULATED.3IONS-SCNC: 8 MMOL/L (ref 9–17)
AST SERPL-CCNC: 15 U/L
BILIRUB SERPL-MCNC: 1.5 MG/DL (ref 0.3–1.2)
BUN SERPL-MCNC: 13 MG/DL (ref 6–20)
BUN/CREAT BLD: 19 (ref 9–20)
CALCIUM SERPL-MCNC: 8.9 MG/DL (ref 8.6–10.4)
CHLORIDE SERPL-SCNC: 104 MMOL/L (ref 98–107)
CHOLEST SERPL-MCNC: 160 MG/DL
CHOLESTEROL/HDL RATIO: 4.3
CO2 SERPL-SCNC: 28 MMOL/L (ref 20–31)
CREAT SERPL-MCNC: 0.69 MG/DL (ref 0.7–1.2)
GFR SERPL CREATININE-BSD FRML MDRD: >60 ML/MIN/1.73M2
GLUCOSE SERPL-MCNC: 116 MG/DL (ref 70–99)
HCT VFR BLD AUTO: 40.1 % (ref 40.7–50.3)
HDLC SERPL-MCNC: 37 MG/DL
HGB BLD-MCNC: 13.4 G/DL (ref 13–17)
LDLC SERPL CALC-MCNC: 108 MG/DL (ref 0–130)
MCH RBC QN AUTO: 31.5 PG (ref 25.2–33.5)
MCHC RBC AUTO-ENTMCNC: 33.4 G/DL (ref 28.4–34.8)
MCV RBC AUTO: 94.1 FL (ref 82.6–102.9)
NRBC AUTOMATED: 0 PER 100 WBC
PDW BLD-RTO: 12.7 % (ref 11.8–14.4)
PLATELET # BLD AUTO: 278 K/UL (ref 138–453)
PMV BLD AUTO: 9.9 FL (ref 8.1–13.5)
POTASSIUM SERPL-SCNC: 4.2 MMOL/L (ref 3.7–5.3)
PROT SERPL-MCNC: 7 G/DL (ref 6.4–8.3)
RBC # BLD: 4.26 M/UL (ref 4.21–5.77)
SODIUM SERPL-SCNC: 140 MMOL/L (ref 135–144)
TRIGL SERPL-MCNC: 73 MG/DL
TSH SERPL-ACNC: 1.29 UIU/ML (ref 0.3–5)
WBC # BLD AUTO: 7.7 K/UL (ref 3.5–11.3)

## 2023-03-17 PROCEDURE — 85027 COMPLETE CBC AUTOMATED: CPT

## 2023-03-17 PROCEDURE — 80061 LIPID PANEL: CPT

## 2023-03-17 PROCEDURE — 84443 ASSAY THYROID STIM HORMONE: CPT

## 2023-03-17 PROCEDURE — 36415 COLL VENOUS BLD VENIPUNCTURE: CPT

## 2023-03-17 PROCEDURE — 80053 COMPREHEN METABOLIC PANEL: CPT

## 2023-03-17 RX ORDER — LOSARTAN POTASSIUM 50 MG/1
50 TABLET ORAL DAILY
Qty: 30 TABLET | Refills: 1 | Status: SHIPPED | OUTPATIENT
Start: 2023-03-17 | End: 2023-04-16

## 2023-03-17 RX ORDER — METOPROLOL SUCCINATE 25 MG/1
TABLET, EXTENDED RELEASE ORAL 2 TIMES DAILY
COMMUNITY
Start: 2023-03-08 | End: 2023-03-17 | Stop reason: ALTCHOICE

## 2023-03-17 RX ORDER — MELOXICAM 15 MG/1
TABLET ORAL
COMMUNITY
Start: 2023-02-19

## 2023-03-17 RX ORDER — CARVEDILOL 12.5 MG/1
12.5 TABLET ORAL 2 TIMES DAILY
Qty: 60 TABLET | Refills: 5 | Status: CANCELLED | OUTPATIENT
Start: 2023-03-17

## 2023-03-17 NOTE — PROGRESS NOTES
Patient: Lo Simpson  : 1970  Date of Visit: 2023    REASON FOR VISIT / CONSULTATION: New Patient (New patient. Est care for hypertension. Patient does c/o headaches and SOB. )      History of Present Illness:        Dear Clyde Segal, DO    I had the pleasure of seeing Lo Simpson in my office today. Mr. Ron Mehta is a 45 y.o. male with a history of difficult to control hypertension. He has no past cardiac history. He denies any family history of cardiac conditions, he thinks his father had heart issues but is unsure. He has never been a smoker. He had COVID and a blood clot in 2021, he is no longer on the Xarelto. No further clotting issues. Mr. Ron Mehta is here today to establish care, he has been having an elevated blood pressure. He reports he has been on medication over the past 2 years. He reports he has had some chest tightness on the left side of his chest. He reports it lasts minutes at a time. He reports it is worse with stress. The chest discomfort goes away on its own. No radiation of pain. He has shortness of breath that is worsening, over the past 3 weeks or so. He walked down to the basement and back up and he was out of breath. His shortness of breath has been worsening since starting the metoprolol. He reports some dizziness and headaches since blood pressure has been elevated. He has also been very tired over the past week. He denies any abdominal pain, nausea, vomiting. No cough, fever, or chills. No blood in his urine or stool. He reports he does not sleep well at night. He reports he is now on day shift instead of third shift. He reports he has polyps in his nose he needs removed. He wakes up gasping for air at night sometimes due to this. He sleeps on his back with his head elevated. He has never been told he has sleep apnea.     PAST MEDICAL HISTORY:        Past Medical History:   Diagnosis Date    Bacterial pneumonia     Hypertension Pancreatitis 07/18/2021       CURRENT ALLERGIES: Patient has no known allergies. REVIEW OF SYSTEMS: 14 systems were reviewed. Pertinent positives and negatives as above, all else negative. Past Surgical History:   Procedure Laterality Date    CHOLECYSTECTOMY  07/21/2021    Dr. Rin Abarca - laparoscopic, robotic    CHOLECYSTECTOMY, LAPAROSCOPIC N/A 7/21/2021    CHOLECYSTECTOMY LAPAROSCOPIC ROBOTIC performed by Jose Barber MD at [facility] History:  Social History     Tobacco Use    Smoking status: Never    Smokeless tobacco: Never   Vaping Use    Vaping Use: Never used   Substance Use Topics    Alcohol use: Yes     Alcohol/week: 2.0 standard drinks     Types: 2 Shots of liquor per week     Comment: 4 days a week    Drug use: Never        CURRENT MEDICATIONS:        Outpatient Medications Marked as Taking for the 3/17/23 encounter (Office Visit) with Roddy Busch PA-C   Medication Sig Dispense Refill    meloxicam (MOBIC) 15 MG tablet TAKE 1 TABLET BY MOUTH EVERY DAY      losartan (COZAAR) 50 MG tablet Take 1 tablet by mouth daily 30 tablet 1    Ascorbic Acid (DENISE-C PO) Take by mouth      VITAMIN E PO Take by mouth      Cholecalciferol (VITAMIN D) 50 MCG (2000 UT) CAPS capsule Take by mouth         FAMILY HISTORY: family history is not on file. Physical Examination:      BP (!) 189/103   Pulse 67   Resp 18   Ht 6' 1\" (1.854 m)   Wt (!) 360 lb (163.3 kg)   SpO2 98%   BMI 47.50 kg/m²  Body mass index is 47.5 kg/m². Constitutional: He is oriented to person. He appears well-developed and well-nourished. In no acute distress. HEENT: Normocephalic and atraumatic. No JVD present. Carotid bruit is not present. No mass and no thyromegaly present. No lymphadenopathy present. Cardiovascular: Normal rate, regular rhythm, normal heart sounds. Exam reveals no gallop and no friction rubs. No murmur was heard. .   Pulmonary/Chest: Effort normal and breath sounds normal. No respiratory distress. He has no wheezes, rhonchi or rales. Abdominal: Soft, non-tender. Bowel sounds and aorta are normal. He exhibits no organomegaly, mass or bruit. Extremities: None. No cyanosis or clubbing. 2+ radial and carotid pulses. Distal extremity pulses: 2+ bilaterally. .  Neurological: He is alert and oriented to person. No evidence of gross cranial nerve deficit. Coordination appeared normal.   Skin: Skin is warm and dry. There is no rash or diaphoresis. Psychiatric: He has a normal mood and affect. His speech is normal and behavior is normal.      MOST RECENT LABS ON RECORD:   Lab Results   Component Value Date    WBC 7.7 03/17/2023    HGB 13.4 03/17/2023    HCT 40.1 (L) 03/17/2023     03/17/2023    CHOL 166 07/19/2021    TRIG 67 07/19/2021    HDL 47 07/19/2021    ALT 24 03/17/2023    AST 15 03/17/2023     03/17/2023    K 4.2 03/17/2023     03/17/2023    CREATININE 0.69 (L) 03/17/2023    BUN 13 03/17/2023    CO2 28 03/17/2023    TSH 1.29 03/17/2023    LABA1C 5.6 07/20/2021       ASSESSMENT:     1. Primary hypertension    2. Chest pain, unspecified type    3. SOB (shortness of breath)    4. COLLETTE (obstructive sleep apnea)    5. Morbid obesity with body mass index (BMI) of 45.0 to 49.9 in adult [facility])         PLAN:        Essential Hypertension: Uncontrolled  ACE Inibitor/ARB: STOP losartan (Cozaar) and START losartan (Cozaar) 50 mg daily. I also discussed the potential side effects of this medication including lightheadedness and dizziness and instructed them to stop the medication of this occurs and call our office if this occurs. I wanted to start him on losartan-hctz however he reports in the past diuretics caused him to have a rash on his legs and he had to stop the medication. Diuretics: Not indicated at this time. Calcium Channel Blocker: Not indicated at this time. Beta Blocker: STOP Metoprolol succinate (Toprol XL)   Additional testing:  Additional Testing List: I ordered a echocardiogram to better assess for the etiology of this problem and to help guide future management  I asked him to call on Monday and let us know how his blood pressure was running. Atypical chest pain but still suspicious for possible myocardial ischemia:  Medical Management for Suspected coronary artery disease:  Antiplatelet Agent: Not indicated at this time. Beta Blocker: STOP Metoprolol succinate (Toprol XL)   Anti-anginal medications: Not indicated at this time. Cholesterol Reduction Therapy: Not indicated at this time. Additional Testing List:  I took the liberty of ordering a CBC. I told them that they could get their lab work performed at the location of their choosing, unfortunately, if the lab work was not performed at a [facility] facility I could not guarantee my ability to follow up with them on their results. , I took the liberty of ordering a TSH with reflex T4. I told them that they could get their lab work performed at the location of their choosing, unfortunately, if the lab work was not performed at a [facility] facility I could not guarantee my ability to follow up with them on their results. , and I ordered a complete metabolic panel (CMP). I told them that they could get their lab work performed at the location of their choosing, unfortunately, if the lab work was not performed at a [facility] facility I could not guarantee my ability to follow up with them on their results. , I ordered a echocardiogram to better assess for the etiology of this problem and to help guide future management, and I ordered a treadmill stress test WITHOUT imaging to try and rule out this possibility. Additional counseling: I advised them to call our office or go to the emergency room if they developed worsening or persistent chest pain or increased shortness of breath as this could be life threatening.      Shortness of breath with mild exertion:  Additional Testing List: I ordered a echocardiogram to better assess for the etiology of this problem and to help guide future management and I ordered a treadmill stress test WITHOUT imaging to try and rule out this possibility. Suspected Obstructive Sleep Apnea: Given Mr. Roly Silva symptoms of daytime tiredness and not waking up rested in the morning, I advised him to consider undergoing a sleep apnea screening which he agreed to do. Therefore I ordered a Apnea link home screening monitor for him. Morbid obesity: Body mass index is 47.5 kg/m². I also briefly discussed both diet and exercise strategies for him to continue to loses weight and he was very receptive to this. In the meantime, I encouraged Mr. Angelic Hernandez to continue to take his other medications. I discussed patient's symptoms and treatment plan with Dr Keila Mccauley, he was in agreement with the plan and follow up. FOLLOW UP:   I told Mr. Angelic Hernandez to call my office if he had any problems, but otherwise I asked him to Return in about 6 weeks (around 4/28/2023). However, I would be happy to see him sooner should the need arise. Sincerely,  Roddy Busch PA-C  [facility][1] Cardiology Specialist    [address]  [place], [address] [place] [address]  Phone: [phone], Fax: [phone]     I believe that the risk of significant morbidity and mortality related to the patient's current medical conditions are: intermediate-high. Approximately 50 minutes were spent during prep work, discussion and exam of the patient, and follow up documentation and all of their questions were answered.       March 17, 2023

## 2023-03-17 NOTE — PATIENT INSTRUCTIONS
SURVEY:    You may be receiving a survey from [employer] regarding your visit today. Please complete the survey to enable us to provide the highest quality of care to you and your family. If you cannot score us a very good on any question, please call the office to discuss how we could have made your experience a very good one. Thank you.

## 2023-03-20 ENCOUNTER — TELEPHONE (OUTPATIENT)
Dept: CARDIOLOGY | Age: 53
End: 2023-03-20

## 2023-03-20 NOTE — TELEPHONE ENCOUNTER
Patient called with bp readings since his medication changes on Friday. 162/112 162/108 those are down from 172/112 this morning. At work they do not consider him fit for work with this bp readings and they are sending him home until this is taken care of. They want him at 140/90 at the max. Please advise.

## 2023-03-20 NOTE — TELEPHONE ENCOUNTER
----- Message from Tessa Villeda PA-C sent at 3/20/2023  3:25 PM EDT -----  Please notify patient that their lab results are normal.   Please continue current treatment and follow up.

## 2023-03-30 ENCOUNTER — TELEPHONE (OUTPATIENT)
Dept: CARDIOLOGY | Age: 53
End: 2023-03-30

## 2023-03-30 RX ORDER — LOSARTAN POTASSIUM AND HYDROCHLOROTHIAZIDE 12.5; 1 MG/1; MG/1
1 TABLET ORAL DAILY
Qty: 30 TABLET | Refills: 1 | Status: SHIPPED | OUTPATIENT
Start: 2023-03-30 | End: 2023-04-29

## 2023-03-30 NOTE — TELEPHONE ENCOUNTER
MrCody Chaudhary called in and said his BP has been 180/110 in the mornings before medications. After medications its still high around 160/100. He is also going to be out of the Losartan on Sunday and if we are going to continue this he will need a new script. Please advise. Thank you.

## 2023-04-17 ENCOUNTER — HOSPITAL ENCOUNTER (OUTPATIENT)
Dept: SLEEP CENTER | Age: 53
Discharge: HOME | End: 2023-04-17
Payer: COMMERCIAL

## 2023-04-17 DIAGNOSIS — G47.33 OSA (OBSTRUCTIVE SLEEP APNEA): ICD-10-CM

## 2023-04-17 DIAGNOSIS — E66.01 MORBID OBESITY WITH BODY MASS INDEX (BMI) OF 45.0 TO 49.9 IN ADULT (HCC): ICD-10-CM

## 2023-04-17 DIAGNOSIS — R06.02 SOB (SHORTNESS OF BREATH): ICD-10-CM

## 2023-04-17 DIAGNOSIS — I10 PRIMARY HYPERTENSION: ICD-10-CM

## 2023-04-17 PROCEDURE — 95806 SLEEP STUDY UNATT&RESP EFFT: CPT

## 2023-04-17 ASSESSMENT — SLEEP AND FATIGUE QUESTIONNAIRES
WHAT TIME DO YOU USUALLY GO TO BED: 82800
HOW LIKELY ARE YOU TO NOD OFF OR FALL ASLEEP WHILE SITTING AND TALKING TO SOMEONE: 0
HOW OFTEN DO YOU SNORE: ALMOST DAILY
HOW LIKELY ARE YOU TO NOD OFF OR FALL ASLEEP WHILE SITTING QUIETLY AFTER LUNCH WITHOUT ALCOHOL: 1
HOW LIKELY ARE YOU TO NOD OFF OR FALL ASLEEP WHILE LYING DOWN TO REST IN THE AFTERNOON WHEN CIRCUMSTANCES PERMIT: 2
USUAL AMOUNT OF TIME TO FALL ASLEEP (MIN): 30
HAS ANYONE NOTICED THAT YOU QUIT BREATHING DURING SLEEP: 1-2 TIMES A WEEK
ESS TOTAL SCORE: 9
ARE YOU TIRED DURING WAKE TIME: ALMOST DAILY
DOES YOUR SNORING BOTHER OTHERS: YES
DO YOU SNORE: YES
HOW LIKELY ARE YOU TO NOD OFF OR FALL ASLEEP IN A CAR, WHILE STOPPED FOR A FEW MINUTES IN TRAFFIC: 0
HOW LIKELY ARE YOU TO NOD OFF OR FALL ASLEEP WHILE SITTING AND READING: 1
HOW LIKELY ARE YOU TO NOD OFF OR FALL ASLEEP WHILE WATCHING TV: 3
HOW LIKELY ARE YOU TO NOD OFF OR FALL ASLEEP WHILE SITTING INACTIVE IN A PUBLIC PLACE: 0
HOW MANY NAPS DO YOU TAKE PER WEEK: 4
NORMAL AMOUNT OF SLEEP PER NIGHT: 5.5
WHAT TIME DO YOU USUALLY WAKE UP: 16200
HOW LIKELY ARE YOU TO NOD OFF OR FALL ASLEEP WHEN YOU ARE A PASSENGER IN A CAR FOR AN HOUR WITHOUT A BREAK: 2
ARE YOU TIRED AFTER SLEEPING: 3-4 TIMES A WEEK

## 2023-04-24 RX ORDER — LOSARTAN POTASSIUM AND HYDROCHLOROTHIAZIDE 12.5; 1 MG/1; MG/1
TABLET ORAL
Qty: 30 TABLET | Refills: 1 | Status: SHIPPED | OUTPATIENT
Start: 2023-04-24

## 2023-04-26 LAB — STATUS: NORMAL

## 2023-04-27 ENCOUNTER — HOSPITAL ENCOUNTER (OUTPATIENT)
Dept: NON INVASIVE DIAGNOSTICS | Age: 53
Discharge: HOME | End: 2023-04-27
Payer: COMMERCIAL

## 2023-04-27 DIAGNOSIS — R07.9 CHEST PAIN, UNSPECIFIED TYPE: ICD-10-CM

## 2023-04-27 DIAGNOSIS — I10 PRIMARY HYPERTENSION: ICD-10-CM

## 2023-04-27 DIAGNOSIS — R06.02 SOB (SHORTNESS OF BREATH): ICD-10-CM

## 2023-04-27 DIAGNOSIS — E66.01 MORBID OBESITY WITH BODY MASS INDEX (BMI) OF 45.0 TO 49.9 IN ADULT (HCC): ICD-10-CM

## 2023-04-27 LAB
LV EF: 60 %
LVEF MODALITY: NORMAL

## 2023-04-27 PROCEDURE — A9500 TC99M SESTAMIBI: HCPCS | Performed by: PHYSICIAN ASSISTANT

## 2023-04-27 PROCEDURE — 93306 TTE W/DOPPLER COMPLETE: CPT

## 2023-04-27 PROCEDURE — 3430000000 HC RX DIAGNOSTIC RADIOPHARMACEUTICAL: Performed by: PHYSICIAN ASSISTANT

## 2023-04-27 PROCEDURE — 93017 CV STRESS TEST TRACING ONLY: CPT

## 2023-04-27 RX ORDER — TETRAKIS(2-METHOXYISOBUTYLISOCYANIDE)COPPER(I) TETRAFLUOROBORATE 1 MG/ML
30 INJECTION, POWDER, LYOPHILIZED, FOR SOLUTION INTRAVENOUS
Status: COMPLETED | OUTPATIENT
Start: 2023-04-27 | End: 2023-04-27

## 2023-04-27 RX ADMIN — Medication 30 MILLICURIE: at 09:28

## 2023-04-28 ENCOUNTER — HOSPITAL ENCOUNTER (OUTPATIENT)
Dept: NON INVASIVE DIAGNOSTICS | Age: 53
Discharge: HOME | End: 2023-04-28
Payer: COMMERCIAL

## 2023-04-28 PROCEDURE — 3430000000 HC RX DIAGNOSTIC RADIOPHARMACEUTICAL: Performed by: PHYSICIAN ASSISTANT

## 2023-04-28 PROCEDURE — 78452 HT MUSCLE IMAGE SPECT MULT: CPT

## 2023-04-28 PROCEDURE — A9500 TC99M SESTAMIBI: HCPCS | Performed by: PHYSICIAN ASSISTANT

## 2023-04-28 RX ORDER — TETRAKIS(2-METHOXYISOBUTYLISOCYANIDE)COPPER(I) TETRAFLUOROBORATE 1 MG/ML
30 INJECTION, POWDER, LYOPHILIZED, FOR SOLUTION INTRAVENOUS
Status: COMPLETED | OUTPATIENT
Start: 2023-04-28 | End: 2023-04-28

## 2023-04-28 RX ADMIN — Medication 30 MILLICURIE: at 14:02

## 2023-05-01 ENCOUNTER — OFFICE VISIT (OUTPATIENT)
Dept: CARDIOLOGY | Age: 53
End: 2023-05-01
Payer: COMMERCIAL

## 2023-05-01 VITALS
SYSTOLIC BLOOD PRESSURE: 154 MMHG | WEIGHT: 315 LBS | BODY MASS INDEX: 41.75 KG/M2 | HEIGHT: 73 IN | RESPIRATION RATE: 18 BRPM | HEART RATE: 93 BPM | DIASTOLIC BLOOD PRESSURE: 91 MMHG | OXYGEN SATURATION: 95 %

## 2023-05-01 DIAGNOSIS — R94.39 ABNORMAL STRESS TEST: ICD-10-CM

## 2023-05-01 DIAGNOSIS — I10 PRIMARY HYPERTENSION: ICD-10-CM

## 2023-05-01 DIAGNOSIS — R07.9 CHEST PAIN, UNSPECIFIED TYPE: ICD-10-CM

## 2023-05-01 DIAGNOSIS — R06.02 SOB (SHORTNESS OF BREATH): ICD-10-CM

## 2023-05-01 DIAGNOSIS — E66.01 MORBID OBESITY WITH BODY MASS INDEX (BMI) OF 45.0 TO 49.9 IN ADULT (HCC): ICD-10-CM

## 2023-05-01 DIAGNOSIS — G47.33 OSA (OBSTRUCTIVE SLEEP APNEA): ICD-10-CM

## 2023-05-01 DIAGNOSIS — G44.209 TENSION HEADACHE: ICD-10-CM

## 2023-05-01 PROCEDURE — 3077F SYST BP >= 140 MM HG: CPT | Performed by: PHYSICIAN ASSISTANT

## 2023-05-01 PROCEDURE — 3080F DIAST BP >= 90 MM HG: CPT | Performed by: PHYSICIAN ASSISTANT

## 2023-05-01 PROCEDURE — 99214 OFFICE O/P EST MOD 30 MIN: CPT | Performed by: PHYSICIAN ASSISTANT

## 2023-05-01 RX ORDER — ASPIRIN 81 MG/1
81 TABLET ORAL DAILY
Qty: 30 TABLET | Refills: 3 | COMMUNITY
Start: 2023-05-01

## 2023-05-01 RX ORDER — CARVEDILOL 12.5 MG/1
12.5 TABLET ORAL 2 TIMES DAILY
Qty: 60 TABLET | Refills: 1 | Status: ON HOLD | OUTPATIENT
Start: 2023-05-01 | End: 2023-05-04 | Stop reason: HOSPADM

## 2023-05-01 NOTE — PATIENT INSTRUCTIONS
SURVEY:    You may be receiving a survey from [employer] regarding your visit today. Please complete the survey to enable us to provide the highest quality of care to you and your family. If you cannot score us a very good on any question, please call the office to discuss how we could have made your experience a very good one. Thank you.

## 2023-05-01 NOTE — PROGRESS NOTES
on their results. Shortness of breath with mild exertion:  Heart cath as above. Obstructive Sleep Apnea: Ordered a sleep titration to be done. Morbid obesity: Body mass index is 44.59 kg/m². I also briefly discussed both diet and exercise strategies for him to continue to loses weight and he was very receptive to this. Tension Headache  Instructed him to contact Jia Jon DO if his headache continues. In the meantime, I encouraged Mr. Krystal Thompson to continue to take his other medications. FOLLOW UP:   I told Mr. Krystal Thompson to call my office if he had any problems, but otherwise I asked him to Return in about 2 weeks (around 5/15/2023). However, I would be happy to see him sooner should the need arise. Sincerely,  Neil Tineo PA-C  [facility] Cardiology Specialist    [address] [address] [place] [address]  Phone: [phone], Fax: [phone]     I believe that the risk of significant morbidity and mortality related to the patient's current medical conditions are: intermediate-high.   45 minutes      May 1, 2023

## 2023-05-02 ENCOUNTER — TELEPHONE (OUTPATIENT)
Dept: CARDIOLOGY | Age: 53
End: 2023-05-02

## 2023-05-02 ENCOUNTER — HOSPITAL ENCOUNTER (OUTPATIENT)
Age: 53
Discharge: HOME | End: 2023-05-02
Payer: COMMERCIAL

## 2023-05-02 DIAGNOSIS — G47.33 OSA (OBSTRUCTIVE SLEEP APNEA): ICD-10-CM

## 2023-05-02 DIAGNOSIS — R06.02 SOB (SHORTNESS OF BREATH): ICD-10-CM

## 2023-05-02 DIAGNOSIS — R07.9 CHEST PAIN, UNSPECIFIED TYPE: ICD-10-CM

## 2023-05-02 DIAGNOSIS — E66.01 MORBID OBESITY WITH BODY MASS INDEX (BMI) OF 45.0 TO 49.9 IN ADULT (HCC): ICD-10-CM

## 2023-05-02 DIAGNOSIS — I10 PRIMARY HYPERTENSION: ICD-10-CM

## 2023-05-02 LAB
ANION GAP SERPL CALCULATED.3IONS-SCNC: 9 MMOL/L (ref 9–17)
BUN SERPL-MCNC: 15 MG/DL (ref 6–20)
BUN/CREAT BLD: 22 (ref 9–20)
CALCIUM SERPL-MCNC: 9.1 MG/DL (ref 8.6–10.4)
CHLORIDE SERPL-SCNC: 103 MMOL/L (ref 98–107)
CO2 SERPL-SCNC: 27 MMOL/L (ref 20–31)
CREAT SERPL-MCNC: 0.68 MG/DL (ref 0.7–1.2)
GFR SERPL CREATININE-BSD FRML MDRD: >60 ML/MIN/1.73M2
GLUCOSE SERPL-MCNC: 102 MG/DL (ref 70–99)
HCT VFR BLD AUTO: 40.8 % (ref 40.7–50.3)
HGB BLD-MCNC: 13.8 G/DL (ref 13–17)
MCH RBC QN AUTO: 31 PG (ref 25.2–33.5)
MCHC RBC AUTO-ENTMCNC: 33.8 G/DL (ref 28.4–34.8)
MCV RBC AUTO: 91.7 FL (ref 82.6–102.9)
NRBC AUTOMATED: 0 PER 100 WBC
PDW BLD-RTO: 12.8 % (ref 11.8–14.4)
PLATELET # BLD AUTO: 303 K/UL (ref 138–453)
PMV BLD AUTO: 9.9 FL (ref 8.1–13.5)
POTASSIUM SERPL-SCNC: 3.9 MMOL/L (ref 3.7–5.3)
RBC # BLD: 4.45 M/UL (ref 4.21–5.77)
SODIUM SERPL-SCNC: 139 MMOL/L (ref 135–144)
WBC # BLD AUTO: 6.8 K/UL (ref 3.5–11.3)

## 2023-05-02 PROCEDURE — 36415 COLL VENOUS BLD VENIPUNCTURE: CPT

## 2023-05-02 PROCEDURE — 80048 BASIC METABOLIC PNL TOTAL CA: CPT

## 2023-05-02 PROCEDURE — 85027 COMPLETE CBC AUTOMATED: CPT

## 2023-05-02 NOTE — TELEPHONE ENCOUNTER
----- Message from Rachel Cox PA-C sent at 5/2/2023 12:50 PM EDT -----  Please notify patient that their lab results are normal.   Please continue current treatment and follow up.

## 2023-05-04 ENCOUNTER — HOSPITAL ENCOUNTER (OUTPATIENT)
Dept: CARDIAC CATH/INVASIVE PROCEDURES | Age: 53
Discharge: HOME | End: 2023-05-04
Attending: FAMILY MEDICINE | Admitting: FAMILY MEDICINE
Payer: COMMERCIAL

## 2023-05-04 VITALS
SYSTOLIC BLOOD PRESSURE: 143 MMHG | TEMPERATURE: 97.9 F | RESPIRATION RATE: 18 BRPM | DIASTOLIC BLOOD PRESSURE: 84 MMHG | HEIGHT: 73 IN | OXYGEN SATURATION: 96 % | HEART RATE: 63 BPM | WEIGHT: 315 LBS | BODY MASS INDEX: 41.75 KG/M2

## 2023-05-04 DIAGNOSIS — R06.02 SHORTNESS OF BREATH ON EXERTION: Primary | ICD-10-CM

## 2023-05-04 PROBLEM — R94.39 ABNORMAL RESULT OF OTHER CARDIOVASCULAR FUNCTION STUDY: Status: ACTIVE | Noted: 2023-05-04

## 2023-05-04 PROCEDURE — 6360000004 HC RX CONTRAST MEDICATION: Performed by: FAMILY MEDICINE

## 2023-05-04 PROCEDURE — 2500000003 HC RX 250 WO HCPCS

## 2023-05-04 PROCEDURE — 2709999900 HC NON-CHARGEABLE SUPPLY

## 2023-05-04 PROCEDURE — 6360000002 HC RX W HCPCS

## 2023-05-04 PROCEDURE — 93458 L HRT ARTERY/VENTRICLE ANGIO: CPT

## 2023-05-04 PROCEDURE — 2500000003 HC RX 250 WO HCPCS: Performed by: FAMILY MEDICINE

## 2023-05-04 PROCEDURE — 99152 MOD SED SAME PHYS/QHP 5/>YRS: CPT

## 2023-05-04 PROCEDURE — C1769 GUIDE WIRE: HCPCS

## 2023-05-04 PROCEDURE — C1894 INTRO/SHEATH, NON-LASER: HCPCS

## 2023-05-04 RX ORDER — LOSARTAN POTASSIUM AND HYDROCHLOROTHIAZIDE 25; 100 MG/1; MG/1
1 TABLET ORAL DAILY
Qty: 90 TABLET | Refills: 3 | Status: SHIPPED | OUTPATIENT
Start: 2023-05-04

## 2023-05-04 RX ORDER — DIPHENHYDRAMINE HCL 50 MG
50 CAPSULE ORAL ONCE
Status: DISCONTINUED | OUTPATIENT
Start: 2023-05-04 | End: 2023-05-04 | Stop reason: HOSPADM

## 2023-05-04 RX ORDER — ACETAMINOPHEN 325 MG/1
650 TABLET ORAL EVERY 4 HOURS PRN
Status: DISCONTINUED | OUTPATIENT
Start: 2023-05-04 | End: 2023-05-04 | Stop reason: HOSPADM

## 2023-05-04 RX ORDER — CARVEDILOL 25 MG/1
25 TABLET ORAL 2 TIMES DAILY
Qty: 180 TABLET | Refills: 3 | Status: SHIPPED | OUTPATIENT
Start: 2023-05-04

## 2023-05-04 RX ORDER — LABETALOL HYDROCHLORIDE 5 MG/ML
10 INJECTION, SOLUTION INTRAVENOUS ONCE
Status: COMPLETED | OUTPATIENT
Start: 2023-05-04 | End: 2023-05-04

## 2023-05-04 RX ORDER — NITROGLYCERIN 0.4 MG/1
0.4 TABLET SUBLINGUAL EVERY 5 MIN PRN
Status: DISCONTINUED | OUTPATIENT
Start: 2023-05-04 | End: 2023-05-04 | Stop reason: HOSPADM

## 2023-05-04 RX ADMIN — LABETALOL HYDROCHLORIDE 10 MG: 5 INJECTION INTRAVENOUS at 10:00

## 2023-05-04 RX ADMIN — IOPAMIDOL 60 ML: 755 INJECTION, SOLUTION INTRAVENOUS at 11:13

## 2023-05-04 NOTE — PROGRESS NOTES
Inpatients must meet criteria 1 through 7.   1. Minimum 30 minutes after last dose of sedative medication, minimum 120 minutes after last dose of reversal agent. Yes   2. Systolic BP stable within 20 mmHg for 30 minutes & systolic BP between 90 & 986 or within 10 mmHg of baseline. Yes   3. Pulse between 60 and 100 or within 10 bpm of baseline. Yes   4. Spontaneous respiratory rate >/= 10 per minute. Yes   5. SaO2 >/= 95 or >/= baseline. Yes   6. Able to cough and swallow or return to baseline function. Yes   7. Alert and oriented or return to baseline mental status. Yes   8. Demonstrates controlled, coordinated movements, ambulates with steady gait, or return to baseline activity function. Yes   9. Minimal or no pain or nausea, or at a level tolerable and acceptable to patient. Yes   10. Takes and retains oral fluids as allowed. Yes   11. Procedural / perioperative site stable. Minimal or no bleeding. Yes   12. If GI endoscopy procedure, minimal or no abdominal distention or passing flatus. N/A   13. Written discharge instructions and emergency telephone number provided. Yes   14. Accompanied by a responsible adult. Yes   Adult patient discharged from facility without responsible person meets above criteria plus the following:   a) remains awake without stimulus for 30 minutes   b) oriented appropriate for age   c) all vital signs stable   d) no significant risk of losing protective reflexes   e) able to maintain pre-procedure mobility without assistance   f) no nausea or dizziness   g) transportation arrangements that do not require patient to operate motor Vehicle.    Yes

## 2023-05-04 NOTE — OP NOTE
-  Coronary Angiography Brief Post Operative Note:    Mild coronary artery disease without any significant focal stenosis. Normal left ventricular end diastolic pressure (LVEDP). Continue standard risk factor modification as clinically indicated and consider alternative etiologies of the patients symptoms.      Electronically signed by Vivian Garcia MD on 5/4/2023 at 11:54 AM

## 2023-05-04 NOTE — DISCHARGE INSTRUCTIONS
Discharge Instructions for Cardiac Catheterization    A cardiac catheterization is a diagnostic test used to evaluate the health of the heart and its blood vessels. The test is done with a thin catheter carefully threaded into your heart from a leg or arm artery. Most likely, you will be allowed to go home the same day as the procedure. Steps to Take at Home:   Pain- apply ice to site 15-20 minutes every hour for the first 2 days. Showering is okay 24 hours after procedure. No soaking in a pool, hot tub, bath tub, or standing water for one week. Bleeding (outward or under the skin-hematoma)- apply firm pressure for 10-15 minutes or until the bleeding stops, then call your doctor. If unable to get bleeding stopped, call 911. Kidney damage- Call if you urinate less than normal, have swelling or feel puffy, and/or gain 2 or more pounds over night in the first week. If procedure was in ARM:  You were instructed to keep wrist straight and still for two hours after the procedure. The arm and hand may now be used for normal daily activities except, avoid using the heal of hand while getting up and down from furniture for the first few days. Keep affected arm elevated, hand higher than elbow, while pressure dressing in place to decrease swelling. 1)  Gauze and Elastoplast   Remove in 4 hours as follows:     TIME__5:15PM_  Remove 1 piece of tape at a time, waiting 15 -20 minutes between layers to monitor for bleeding. If dressing sticks, place wrist under cool running water to help loosen gauze from site then pat site dry. Diet   Drink plenty of fluids after the test to flush the x-ray dye from your system. Return to your normal diet. No alcoholic beverages for 24 hours after the procedure. Physical Activity   The sedative will make you sleepy. Rest until the effects have worn off. Nausea and vomiting from the sedative is normal and usually does not last long.   Ask your doctor when you will be able

## 2023-05-04 NOTE — PROGRESS NOTES
Pt returned to post-procedural area, report received from [facility], denies pain, right wrist dressing clean, dry and intact

## 2023-05-04 NOTE — PROGRESS NOTES
All discharge instructions given. All questions answered. All belongings returned. PIV removed per protocol.

## 2023-05-04 NOTE — PROGRESS NOTES
Dressed for home. Reviewed medication changes with patient. Ambulates off department unaided, accompanied by spouse. All belongings sent with patient.

## 2023-05-11 ENCOUNTER — TELEPHONE (OUTPATIENT)
Dept: CARDIOLOGY | Age: 53
End: 2023-05-11

## 2023-05-15 ENCOUNTER — HOSPITAL ENCOUNTER (OUTPATIENT)
Age: 53
Discharge: HOME | End: 2023-05-17
Payer: COMMERCIAL

## 2023-05-15 ENCOUNTER — OFFICE VISIT (OUTPATIENT)
Dept: CARDIOLOGY | Age: 53
End: 2023-05-15
Payer: COMMERCIAL

## 2023-05-15 ENCOUNTER — HOSPITAL ENCOUNTER (OUTPATIENT)
Age: 53
Discharge: HOME | End: 2023-05-15
Payer: COMMERCIAL

## 2023-05-15 ENCOUNTER — TELEPHONE (OUTPATIENT)
Dept: CARDIOLOGY | Age: 53
End: 2023-05-15

## 2023-05-15 ENCOUNTER — HOSPITAL ENCOUNTER (OUTPATIENT)
Dept: GENERAL RADIOLOGY | Age: 53
Discharge: HOME | End: 2023-05-17
Payer: COMMERCIAL

## 2023-05-15 VITALS
BODY MASS INDEX: 41.75 KG/M2 | HEART RATE: 56 BPM | WEIGHT: 315 LBS | OXYGEN SATURATION: 97 % | DIASTOLIC BLOOD PRESSURE: 79 MMHG | RESPIRATION RATE: 22 BRPM | SYSTOLIC BLOOD PRESSURE: 139 MMHG | HEIGHT: 73 IN

## 2023-05-15 DIAGNOSIS — R06.02 SOB (SHORTNESS OF BREATH): ICD-10-CM

## 2023-05-15 DIAGNOSIS — R06.02 SOB (SHORTNESS OF BREATH): Primary | ICD-10-CM

## 2023-05-15 LAB
ALBUMIN SERPL-MCNC: 4.2 G/DL (ref 3.5–5.2)
ALBUMIN/GLOB SERPL: 1.9 {RATIO} (ref 1–2.5)
ALP SERPL-CCNC: 63 U/L (ref 40–129)
ALT SERPL-CCNC: 23 U/L (ref 5–41)
ANION GAP SERPL CALCULATED.3IONS-SCNC: 6 MMOL/L (ref 9–17)
AST SERPL-CCNC: 11 U/L
BILIRUB SERPL-MCNC: 1.3 MG/DL (ref 0.3–1.2)
BNP SERPL-MCNC: 975 PG/ML
BUN SERPL-MCNC: 14 MG/DL (ref 6–20)
BUN/CREAT BLD: 21 (ref 9–20)
CALCIUM SERPL-MCNC: 8.8 MG/DL (ref 8.6–10.4)
CHLORIDE SERPL-SCNC: 106 MMOL/L (ref 98–107)
CO2 SERPL-SCNC: 30 MMOL/L (ref 20–31)
CREAT SERPL-MCNC: 0.68 MG/DL (ref 0.7–1.2)
D DIMER BLD IA.RAPID-MCNC: 0.42 UG/ML FEU (ref 0–0.59)
ERYTHROCYTE [DISTWIDTH] IN BLOOD BY AUTOMATED COUNT: 13.5 % (ref 11.8–14.4)
GFR SERPL CREATININE-BSD FRML MDRD: >60 ML/MIN/1.73M2
GLUCOSE SERPL-MCNC: 102 MG/DL (ref 70–99)
HCT VFR BLD AUTO: 38.3 % (ref 40.7–50.3)
HGB BLD-MCNC: 12.4 G/DL (ref 13–17)
MCH RBC QN AUTO: 31.2 PG (ref 25.2–33.5)
MCHC RBC AUTO-ENTMCNC: 32.4 G/DL (ref 28.4–34.8)
MCV RBC AUTO: 96.2 FL (ref 82.6–102.9)
NRBC AUTOMATED: 0 PER 100 WBC
PLATELET # BLD AUTO: 276 K/UL (ref 138–453)
PMV BLD AUTO: 9.7 FL (ref 8.1–13.5)
POTASSIUM SERPL-SCNC: 4.2 MMOL/L (ref 3.7–5.3)
PROT SERPL-MCNC: 6.4 G/DL (ref 6.4–8.3)
RBC # BLD AUTO: 3.98 M/UL (ref 4.21–5.77)
SODIUM SERPL-SCNC: 142 MMOL/L (ref 135–144)
WBC OTHER # BLD: 7.5 K/UL (ref 3.5–11.3)

## 2023-05-15 PROCEDURE — 83880 ASSAY OF NATRIURETIC PEPTIDE: CPT

## 2023-05-15 PROCEDURE — 3078F DIAST BP <80 MM HG: CPT | Performed by: PHYSICIAN ASSISTANT

## 2023-05-15 PROCEDURE — 36415 COLL VENOUS BLD VENIPUNCTURE: CPT

## 2023-05-15 PROCEDURE — 85379 FIBRIN DEGRADATION QUANT: CPT

## 2023-05-15 PROCEDURE — 99214 OFFICE O/P EST MOD 30 MIN: CPT | Performed by: PHYSICIAN ASSISTANT

## 2023-05-15 PROCEDURE — 3075F SYST BP GE 130 - 139MM HG: CPT | Performed by: PHYSICIAN ASSISTANT

## 2023-05-15 PROCEDURE — 85027 COMPLETE CBC AUTOMATED: CPT

## 2023-05-15 PROCEDURE — 71046 X-RAY EXAM CHEST 2 VIEWS: CPT

## 2023-05-15 PROCEDURE — 80053 COMPREHEN METABOLIC PANEL: CPT

## 2023-05-15 RX ORDER — FUROSEMIDE 20 MG/1
20 TABLET ORAL DAILY
Qty: 30 TABLET | Refills: 1 | Status: SHIPPED | OUTPATIENT
Start: 2023-05-15 | End: 2023-06-14

## 2023-05-15 NOTE — PROGRESS NOTES
Maria Esther Parker am scribing for and in the presence of Eden Prasad PA-C. Patient: Alvarado Muller  : 1970  Date of Visit: May 15, 2023    REASON FOR VISIT / CONSULTATION: Hypertension (Hx:HTN,CP,COLLETTE,SOB, Obesity. Cath on . SOB that is worsening. /Little bit of chest pain, feels achy - 5 minutes, left side. Lightheaded/dizziness with standing too fast. Palpitations. )      History of Present Illness:        Dear Ratna Jennings DO    I had the pleasure of seeing Alvarado Muller in my office today. Mr. Broderick Bonilla is a 52 y.o. male with a history of difficult to control hypertension. He has no past cardiac history. He denies any family history of cardiac conditions, he thinks his father had heart issues but is unsure. He has never been a smoker. He had COVID and a blood clot in 2021, he is no longer on the Xarelto. No further clotting issues. Echo done 2023: Global left ventricular systolic function appears preserved with an estimated ejection fraction of 60%. The left ventricular cavity size is within normal limits and the left ventricular wall thickness is moderately increased. The left atrium is mildly dilated (29-33) with a left atrial volume index of 30 ml/m2. Evidence of moderate diastolic dysfunction is seen. Stress test done on 2023: Equivocal myocardial perfusion study. There is a small to moderate perfusion defect of mild intensity in the apical region during stress and rest imaging, which is most consistent with artifact. Global left ventricular systolic function was normal with an ejection fraction of 59%, without regional wall motion abnormalities. No significant electrocardiographic evidence of myocardial ischemia during EKG monitoring without significant associated arrhythmias. Increased right ventricular free wall uptake suggestive of right ventricular hypertrophy.  The patient's Duke Treadmill score is 3, which correlates with an intermediate risk for

## 2023-05-15 NOTE — RESULT ENCOUNTER NOTE
Please notify patient that their lab results showed his hemoglobin is stable, kidney function is stable. His BNP was elevated suggesting heart failure and his chest XR was consistent with heart failure as well. His D-dimer was not elevated. Continue with the plan to start Lasix 20 mg daily and call Wednesday with an updates weight. Please continue current treatment and follow up.

## 2023-05-15 NOTE — PATIENT INSTRUCTIONS
SURVEY:    You may be receiving a survey from [employer] regarding your visit today. Please complete the survey to enable us to provide the highest quality of care to you and your family. If you cannot score us a very good on any question, please call the office to discuss how we could have made your experience a very good one. Thank you.

## 2023-05-17 ENCOUNTER — TELEPHONE (OUTPATIENT)
Dept: CARDIOLOGY | Age: 53
End: 2023-05-17

## 2023-05-17 NOTE — TELEPHONE ENCOUNTER
Pt called and states since starting lasix he is down 6 lbs breathing still has some swelling 145/90 HR 57. Highest /122 yesterday morning before meds. Please advise.     Thank you   Gilbert Rice

## 2023-05-22 ENCOUNTER — HOSPITAL ENCOUNTER (OUTPATIENT)
Age: 53
Discharge: HOME | End: 2023-05-22
Payer: COMMERCIAL

## 2023-05-22 ENCOUNTER — OFFICE VISIT (OUTPATIENT)
Dept: CARDIOLOGY | Age: 53
End: 2023-05-22
Payer: COMMERCIAL

## 2023-05-22 VITALS
WEIGHT: 315 LBS | SYSTOLIC BLOOD PRESSURE: 137 MMHG | HEART RATE: 66 BPM | BODY MASS INDEX: 41.75 KG/M2 | HEIGHT: 73 IN | DIASTOLIC BLOOD PRESSURE: 78 MMHG | RESPIRATION RATE: 20 BRPM | OXYGEN SATURATION: 97 %

## 2023-05-22 DIAGNOSIS — I10 PRIMARY HYPERTENSION: ICD-10-CM

## 2023-05-22 DIAGNOSIS — I50.33 ACUTE ON CHRONIC DIASTOLIC CONGESTIVE HEART FAILURE (HCC): ICD-10-CM

## 2023-05-22 DIAGNOSIS — R06.02 SOB (SHORTNESS OF BREATH): ICD-10-CM

## 2023-05-22 DIAGNOSIS — I25.10 MILD CAD: ICD-10-CM

## 2023-05-22 DIAGNOSIS — E66.01 MORBID OBESITY (HCC): ICD-10-CM

## 2023-05-22 DIAGNOSIS — G47.33 OSA (OBSTRUCTIVE SLEEP APNEA): ICD-10-CM

## 2023-05-22 LAB
ANION GAP SERPL CALCULATED.3IONS-SCNC: 9 MMOL/L (ref 9–17)
BUN SERPL-MCNC: 20 MG/DL (ref 6–20)
BUN/CREAT SERPL: 32 (ref 9–20)
CALCIUM SERPL-MCNC: 8.9 MG/DL (ref 8.6–10.4)
CHLORIDE SERPL-SCNC: 103 MMOL/L (ref 98–107)
CO2 SERPL-SCNC: 29 MMOL/L (ref 20–31)
CREAT SERPL-MCNC: 0.63 MG/DL (ref 0.7–1.2)
FREE KAPPA/LAMBDA RATIO: 1.17 (ref 0.26–1.65)
GFR SERPL CREATININE-BSD FRML MDRD: >60 ML/MIN/1.73M2
GLUCOSE SERPL-MCNC: 95 MG/DL (ref 70–99)
KAPPA LC FREE SER-MCNC: 1.64 MG/DL (ref 0.37–1.94)
LAMBDA LC FREE SERPL-MCNC: 1.4 MG/DL (ref 0.57–2.63)
POTASSIUM SERPL-SCNC: 3.8 MMOL/L (ref 3.7–5.3)
SODIUM SERPL-SCNC: 141 MMOL/L (ref 135–144)

## 2023-05-22 PROCEDURE — 80048 BASIC METABOLIC PNL TOTAL CA: CPT

## 2023-05-22 PROCEDURE — 84156 ASSAY OF PROTEIN URINE: CPT

## 2023-05-22 PROCEDURE — 84155 ASSAY OF PROTEIN SERUM: CPT

## 2023-05-22 PROCEDURE — 3078F DIAST BP <80 MM HG: CPT | Performed by: PHYSICIAN ASSISTANT

## 2023-05-22 PROCEDURE — 83521 IG LIGHT CHAINS FREE EACH: CPT

## 2023-05-22 PROCEDURE — 99214 OFFICE O/P EST MOD 30 MIN: CPT | Performed by: PHYSICIAN ASSISTANT

## 2023-05-22 PROCEDURE — 36415 COLL VENOUS BLD VENIPUNCTURE: CPT

## 2023-05-22 PROCEDURE — 84166 PROTEIN E-PHORESIS/URINE/CSF: CPT

## 2023-05-22 PROCEDURE — 84165 PROTEIN E-PHORESIS SERUM: CPT

## 2023-05-22 PROCEDURE — 3075F SYST BP GE 130 - 139MM HG: CPT | Performed by: PHYSICIAN ASSISTANT

## 2023-05-22 NOTE — PATIENT INSTRUCTIONS
SURVEY:    You may be receiving a survey from [employer] regarding your visit today. Please complete the survey to enable us to provide the highest quality of care to you and your family. If you cannot score us a very good on any question, please call the office to discuss how we could have made your experience a very good one. Thank you.

## 2023-05-24 LAB
ALBUMIN PERCENT: 68 % (ref 45–65)
ALBUMIN SERPL-MCNC: 4.3 G/DL (ref 3.2–5.2)
ALPHA 2 PERCENT: 8 % (ref 6–13)
ALPHA1 GLOB SERPL ELPH-MCNC: 0.2 G/DL (ref 0.1–0.4)
ALPHA1 GLOB SERPL ELPH-MCNC: 2 % (ref 3–6)
ALPHA2 GLOB SERPL ELPH-MCNC: 0.5 G/DL (ref 0.5–0.9)
B-GLOBULIN SERPL ELPH-MCNC: 0.7 G/DL (ref 0.5–1.1)
B-GLOBULIN SERPL ELPH-MCNC: 10 % (ref 11–19)
GAMMA GLOB SERPL ELPH-MCNC: 0.7 G/DL (ref 0.5–1.5)
GAMMA GLOBULIN %: 12 % (ref 9–20)
PATHOLOGIST: ABNORMAL
PROT PATTERN SERPL ELPH-IMP: ABNORMAL
PROT SERPL-MCNC: 6.4 G/DL (ref 6.4–8.3)
TOTAL PROT. SUM,%: 100 % (ref 98–102)
TOTAL PROT. SUM: 6.4 G/DL (ref 6.3–8.2)

## 2023-05-26 ENCOUNTER — HOSPITAL ENCOUNTER (OUTPATIENT)
Dept: NUCLEAR MEDICINE | Age: 53
Discharge: HOME | End: 2023-05-26
Payer: COMMERCIAL

## 2023-05-26 DIAGNOSIS — R06.02 SOB (SHORTNESS OF BREATH): ICD-10-CM

## 2023-05-26 DIAGNOSIS — E66.01 MORBID OBESITY (HCC): ICD-10-CM

## 2023-05-26 DIAGNOSIS — I10 PRIMARY HYPERTENSION: ICD-10-CM

## 2023-05-26 DIAGNOSIS — I50.33 ACUTE ON CHRONIC DIASTOLIC CONGESTIVE HEART FAILURE (HCC): ICD-10-CM

## 2023-05-26 DIAGNOSIS — I25.10 MILD CAD: ICD-10-CM

## 2023-05-26 LAB
P E INTERPRETATION, U: NORMAL
PATHOLOGIST: NORMAL
SPECIMEN TYPE: NORMAL
URINE TOTAL PROTEIN: <4 MG/DL

## 2023-05-26 PROCEDURE — 78803 RP LOCLZJ TUM SPECT 1 AREA: CPT

## 2023-05-26 PROCEDURE — A9538 TC99M PYROPHOSPHATE: HCPCS | Performed by: FAMILY MEDICINE

## 2023-05-26 PROCEDURE — 3430000000 HC RX DIAGNOSTIC RADIOPHARMACEUTICAL: Performed by: FAMILY MEDICINE

## 2023-05-26 RX ADMIN — Medication 18 MILLICURIE: at 11:35

## 2023-05-30 ENCOUNTER — TELEPHONE (OUTPATIENT)
Dept: CARDIOLOGY | Age: 53
End: 2023-05-30

## 2023-05-30 NOTE — PROCEDURES
[address], [place] [address]                              CARDIAC STRESS TEST    PATIENT NAME: Yenny Pereira                 :        1970  MED REC NO:   [id number]                              ROOM:  ACCOUNT NO:   [de-identified]                           ADMIT DATE: 2023  PROVIDER:     Katie Rosen MD    CARDIOVASCULAR DIAGNOSTIC DEPARTMENT    DATE OF STUDY:  2023    ORDERING PROVIDER:  Immanuel Awan DO    PRIMARY CARE PROVIDER:  Immanuel Awan DO    INTERPRETING PHYSICIAN:  Katie Rosen MD    99mTechnetium-Pyrophosphate Imaging for the Assessment of Transthyretin  Cardiac Amyloidosis    INDICATION:  Assessment of a cardiac cause of severe heart failure. Imaging Technique:  99mTechnetium-Pyrophosphate imaging. Radiotracer Dose:  18.8 mCi IV    Interval Between Injection And Scan:  1 hour(s). Scan Technique:  Planar and SPECT. Image Quality:  Excellent. IMAGING PROCEDURE:  Chest SPECT and planar images were obtained one hour after injection of  99mTc. If persistent blood pool activity is noted on one hour images  such as in patients with renal failure, delayed images may be obtained  at three hours. Whole body planar imaging was also performed to help identify uptake of  99mTc-PYP in the shoulder and hip girdles which are specific signs of  systemic ATTR amyloidosis. The rest scan dose of 99mTc-PYP 10-20 mCi was given intravenously with  time between injection and acquisition at 1 hour SPECT and planar. IMAGING RESULTS:  The anterior and lateral planar images as well as rotating projection  images and reconstructed SPECT images were reviewed in standard cardiac  imaging planes using commercial software. Myocardial 99mTc-PYP uptake patterns are categorized as: Diffuse. Quantification of myocardial 99mTc-PYP uptake compared to contralateral  lung ratio: 1.3.     H/CL ratios of > 1.5 at

## 2023-05-30 NOTE — TELEPHONE ENCOUNTER
----- Message from Truong Stovall PA-C sent at 5/30/2023  8:10 AM EDT -----  Please notify patient that their lab results are normal.   Please continue current treatment and follow up.

## 2023-05-31 ENCOUNTER — TELEPHONE (OUTPATIENT)
Dept: CARDIOLOGY | Age: 53
End: 2023-05-31

## 2023-05-31 NOTE — TELEPHONE ENCOUNTER
----- Message from Morgan Gavin MD sent at 5/30/2023 11:26 PM EDT -----  Let Patient know his amyloid screen was mildly abnormal but non conclusive. Nothing to worry about right now. Will discuss at next visit. Thanks.

## 2023-06-06 RX ORDER — FUROSEMIDE 20 MG/1
TABLET ORAL
Qty: 90 TABLET | Refills: 3 | Status: SHIPPED | OUTPATIENT
Start: 2023-06-06

## 2023-06-07 ENCOUNTER — HOSPITAL ENCOUNTER (OUTPATIENT)
Dept: PULMONOLOGY | Age: 53
Discharge: HOME | End: 2023-06-07
Payer: COMMERCIAL

## 2023-06-07 DIAGNOSIS — R06.02 SHORTNESS OF BREATH ON EXERTION: ICD-10-CM

## 2023-06-07 PROCEDURE — 94729 DIFFUSING CAPACITY: CPT

## 2023-06-07 PROCEDURE — 94060 EVALUATION OF WHEEZING: CPT

## 2023-06-07 PROCEDURE — 6370000000 HC RX 637 (ALT 250 FOR IP): Performed by: INTERNAL MEDICINE

## 2023-06-07 PROCEDURE — 94664 DEMO&/EVAL PT USE INHALER: CPT

## 2023-06-07 PROCEDURE — 94726 PLETHYSMOGRAPHY LUNG VOLUMES: CPT

## 2023-06-07 RX ORDER — ALBUTEROL SULFATE 90 UG/1
4 AEROSOL, METERED RESPIRATORY (INHALATION) EVERY 6 HOURS PRN
Status: DISCONTINUED | OUTPATIENT
Start: 2023-06-07 | End: 2023-06-07

## 2023-06-07 RX ORDER — ALBUTEROL SULFATE 90 UG/1
4 AEROSOL, METERED RESPIRATORY (INHALATION) ONCE
Status: COMPLETED | OUTPATIENT
Start: 2023-06-07 | End: 2023-06-07

## 2023-06-07 RX ADMIN — ALBUTEROL SULFATE 4 PUFF: 90 AEROSOL, METERED RESPIRATORY (INHALATION) at 07:55

## 2023-06-08 ENCOUNTER — TELEPHONE (OUTPATIENT)
Dept: CARDIOLOGY | Age: 53
End: 2023-06-08

## 2023-06-08 NOTE — TELEPHONE ENCOUNTER
----- Message from Charlett Members, MD sent at 6/8/2023  3:31 PM EDT -----  Let Patient know lung function tests moderately abnormal but nothing to worry about. Discuss with Robin Rocha, DO or we can discuss at next visit. Thanks.

## 2023-06-08 NOTE — PROCEDURES
[address]                               PULMONARY FUNCTION    PATIENT NAME: Christina Desouza                 :        1970  MED REC NO:   [id number]                              ROOM:  ACCOUNT NO:   [de-identified]                           ADMIT DATE: 2023  PROVIDER:     Jocelyn Cornejo    DATE OF PROCEDURE:  2023    FINDINGS:  Spirometry shows FVC is 2.85, 52% predicted. FEV1 is 2.02,  48% predicted. Both FEV1/FVC ratio is normal.  Postbronchodilator,  there is no significant improvement in FEV1 or FVC to suggest  bronchospasticity. FEV1/FVC ratio could be suggestive of mild  obstructive ventilatory impairment. Lung volume shows residual volume  is 1.44, 62% predicted. Total lung capacity is 4.79, 63% predicted. Both are consistent with moderate restrictive ventilatory impairment,  which could be extraparenchymal.  Diffusion capacity is 29.77, 81%  predicted, which is within normal limits. IMPRESSION:  This pulmonary function test is consistent with moderate  restrictive ventilatory impairment, which is likely extraparenchymal.   FEV1/FVC ratio could be suggestive of mild obstructive ventilatory  impairment. Diffusion capacity is normal.  Clinical correlation is  recommended.         Jassi Jara    D: 2023 18:16:25       T: 2023 18:18:45     [email or web address]  Job#: [id number]     Doc#: [id number]    CC:

## 2023-07-10 ENCOUNTER — TELEPHONE (OUTPATIENT)
Dept: CARDIOLOGY | Age: 53
End: 2023-07-10

## 2023-07-10 NOTE — TELEPHONE ENCOUNTER
Patients job wants to put him back on third shift. Pt wants to know if it will effect his heart condition?

## 2023-08-17 ENCOUNTER — TELEPHONE (OUTPATIENT)
Dept: CARDIOLOGY | Age: 53
End: 2023-08-17

## 2023-08-17 NOTE — TELEPHONE ENCOUNTER
Patient called stating his bp has been in the 150-160s and he doesn't have SOB but has been breathing heavier. He just wanted you to be aware in case you wanted to make any changes. Please advise.

## 2023-08-22 ENCOUNTER — OFFICE VISIT (OUTPATIENT)
Dept: CARDIOLOGY | Age: 53
End: 2023-08-22
Payer: COMMERCIAL

## 2023-08-22 VITALS
RESPIRATION RATE: 18 BRPM | DIASTOLIC BLOOD PRESSURE: 71 MMHG | OXYGEN SATURATION: 99 % | HEIGHT: 73 IN | SYSTOLIC BLOOD PRESSURE: 118 MMHG | HEART RATE: 65 BPM | WEIGHT: 315 LBS | BODY MASS INDEX: 41.75 KG/M2

## 2023-08-22 DIAGNOSIS — R06.02 SOB (SHORTNESS OF BREATH): ICD-10-CM

## 2023-08-22 DIAGNOSIS — E66.01 MORBID OBESITY (HCC): ICD-10-CM

## 2023-08-22 DIAGNOSIS — I25.10 MILD CAD: ICD-10-CM

## 2023-08-22 DIAGNOSIS — I10 PRIMARY HYPERTENSION: ICD-10-CM

## 2023-08-22 DIAGNOSIS — I51.7 LVH (LEFT VENTRICULAR HYPERTROPHY): ICD-10-CM

## 2023-08-22 DIAGNOSIS — G47.33 OSA (OBSTRUCTIVE SLEEP APNEA): ICD-10-CM

## 2023-08-22 DIAGNOSIS — I50.32 CHRONIC DIASTOLIC CONGESTIVE HEART FAILURE (HCC): Primary | ICD-10-CM

## 2023-08-22 PROCEDURE — 99214 OFFICE O/P EST MOD 30 MIN: CPT | Performed by: FAMILY MEDICINE

## 2023-08-22 PROCEDURE — 3078F DIAST BP <80 MM HG: CPT | Performed by: FAMILY MEDICINE

## 2023-08-22 PROCEDURE — 3074F SYST BP LT 130 MM HG: CPT | Performed by: FAMILY MEDICINE

## 2023-08-22 RX ORDER — SPIRONOLACTONE 25 MG/1
25 TABLET ORAL DAILY
Qty: 90 TABLET | Refills: 3 | Status: CANCELLED | OUTPATIENT
Start: 2023-08-22

## 2023-08-22 RX ORDER — HYDRALAZINE HYDROCHLORIDE 25 MG/1
25 TABLET, FILM COATED ORAL 3 TIMES DAILY PRN
Qty: 90 TABLET | Refills: 3 | Status: SHIPPED | OUTPATIENT
Start: 2023-08-22

## 2023-08-22 NOTE — PATIENT INSTRUCTIONS
SURVEY:    You may be receiving a survey from [employer] regarding your visit today. Please complete the survey to enable us to provide the highest quality of care to you and your family. If you cannot score us a very good on any question, please call the office to discuss how we could have made your experience a very good one. Thank you.

## 2023-08-22 NOTE — PROGRESS NOTES
Shanae Oneil am scribing for and in the presence of Mihai Oconnell MD, MS, F.A.C.C..     Patient: Kathy Corrigan  : 1970  Date of Visit: 2023    REASON FOR VISIT / CONSULTATION: Congestive Heart Failure (HX:HTN,COLLETTE Pt is here for 3 month follow up he states he is having BP issues 162/112 and he takes extra dose of meds. he is having vision issues with higher BP but only slept 3 hrs, sob  Denies:CP, lightheaded/dizziness,palp)    History of Present Illness:        Dear Julia Ledezma DO,    I had the pleasure of seeing Kathy Corrigan in my office today. Mr. Trinity Henry is a 52 y.o. male with a history of difficult to control hypertension. He has no past cardiac history. He denies any family history of cardiac conditions, he thinks his father had heart issues but is unsure. He has never been a smoker. He had COVID and a blood clot in 2021, he is no longer on the Xarelto. No further clotting issues. Echo done 2023: Global left ventricular systolic function appears preserved with an estimated ejection fraction of 60%. The left ventricular cavity size is within normal limits and the left ventricular wall thickness is moderately increased. The left atrium is mildly dilated (29-33) with a left atrial volume index of 30 ml/m2. Evidence of moderate diastolic dysfunction is seen. Stress test done on 2023: Equivocal myocardial perfusion study. There is a small to moderate perfusion defect of mild intensity in the apical region during stress and rest imaging, which is most consistent with artifact. Global left ventricular systolic function was normal with an ejection fraction of 59%, without regional wall motion abnormalities. No significant electrocardiographic evidence of myocardial ischemia during EKG monitoring without significant associated arrhythmias. Increased right ventricular free wall uptake suggestive of right ventricular hypertrophy.  The patient's Duke

## 2023-09-17 DIAGNOSIS — I10 PRIMARY HYPERTENSION: ICD-10-CM

## 2023-09-18 RX ORDER — HYDRALAZINE HYDROCHLORIDE 25 MG/1
TABLET, FILM COATED ORAL
Qty: 90 TABLET | Refills: 3 | Status: SHIPPED | OUTPATIENT
Start: 2023-09-18

## 2023-12-14 DIAGNOSIS — I10 PRIMARY HYPERTENSION: ICD-10-CM

## 2023-12-14 RX ORDER — HYDRALAZINE HYDROCHLORIDE 25 MG/1
TABLET, FILM COATED ORAL
Qty: 270 TABLET | Refills: 1 | Status: SHIPPED | OUTPATIENT
Start: 2023-12-14

## 2024-04-22 RX ORDER — CARVEDILOL 25 MG/1
25 TABLET ORAL 2 TIMES DAILY
Qty: 180 TABLET | Refills: 3 | Status: SHIPPED | OUTPATIENT
Start: 2024-04-22

## 2024-04-22 RX ORDER — LOSARTAN POTASSIUM AND HYDROCHLOROTHIAZIDE 25; 100 MG/1; MG/1
1 TABLET ORAL DAILY
Qty: 90 TABLET | Refills: 3 | Status: SHIPPED | OUTPATIENT
Start: 2024-04-22

## 2024-05-05 NOTE — TELEPHONE ENCOUNTER
----- Message from Sweetie Cummins PA-C sent at 5/15/2023  1:38 PM EDT -----  Please notify patient that their lab results showed his hemoglobin is stable, kidney function is stable. His BNP was elevated suggesting heart failure and his chest XR was consistent with heart failure as well. His D-dimer was not elevated. Continue with the plan to start Lasix 20 mg daily and call Wednesday with an updates weight. Please continue current treatment and follow up.
Lm to return my call
Pt aware and verbalized understanding.
No

## 2024-05-06 DIAGNOSIS — I10 PRIMARY HYPERTENSION: ICD-10-CM

## 2024-05-06 RX ORDER — HYDRALAZINE HYDROCHLORIDE 25 MG/1
25 TABLET, FILM COATED ORAL 3 TIMES DAILY
Qty: 270 TABLET | Refills: 3 | Status: SHIPPED | OUTPATIENT
Start: 2024-05-06

## 2024-05-06 RX ORDER — FUROSEMIDE 20 MG/1
20 TABLET ORAL DAILY
Qty: 90 TABLET | Refills: 3 | Status: SHIPPED | OUTPATIENT
Start: 2024-05-06

## 2024-05-30 ENCOUNTER — HOSPITAL ENCOUNTER (OUTPATIENT)
Dept: VASCULAR LAB | Age: 54
Discharge: HOME | End: 2024-06-01
Payer: COMMERCIAL

## 2024-05-30 DIAGNOSIS — Z86.72 HISTORY OF THROMBOPHLEBITIS: ICD-10-CM

## 2024-05-30 DIAGNOSIS — M79.662 PAIN OF LEFT LOWER LEG: ICD-10-CM

## 2024-05-30 DIAGNOSIS — M25.562 LEFT KNEE PAIN, UNSPECIFIED CHRONICITY: ICD-10-CM

## 2024-05-30 PROCEDURE — 93971 EXTREMITY STUDY: CPT

## 2024-08-28 ENCOUNTER — OFFICE VISIT (OUTPATIENT)
Dept: CARDIOLOGY | Age: 54
End: 2024-08-28
Payer: COMMERCIAL

## 2024-08-28 VITALS
RESPIRATION RATE: 18 BRPM | HEART RATE: 59 BPM | HEIGHT: 73 IN | BODY MASS INDEX: 41.75 KG/M2 | DIASTOLIC BLOOD PRESSURE: 77 MMHG | OXYGEN SATURATION: 98 % | WEIGHT: 315 LBS | SYSTOLIC BLOOD PRESSURE: 134 MMHG

## 2024-08-28 DIAGNOSIS — M25.569 CHRONIC KNEE PAIN, UNSPECIFIED LATERALITY: ICD-10-CM

## 2024-08-28 DIAGNOSIS — G89.29 CHRONIC KNEE PAIN, UNSPECIFIED LATERALITY: ICD-10-CM

## 2024-08-28 DIAGNOSIS — I10 PRIMARY HYPERTENSION: ICD-10-CM

## 2024-08-28 DIAGNOSIS — I50.32 CHRONIC DIASTOLIC CONGESTIVE HEART FAILURE (HCC): Primary | ICD-10-CM

## 2024-08-28 DIAGNOSIS — R06.02 SOB (SHORTNESS OF BREATH): ICD-10-CM

## 2024-08-28 DIAGNOSIS — G47.33 OSA (OBSTRUCTIVE SLEEP APNEA): ICD-10-CM

## 2024-08-28 DIAGNOSIS — E66.01 MORBID OBESITY (HCC): ICD-10-CM

## 2024-08-28 DIAGNOSIS — I25.10 MILD CAD: ICD-10-CM

## 2024-08-28 PROCEDURE — 3075F SYST BP GE 130 - 139MM HG: CPT | Performed by: FAMILY MEDICINE

## 2024-08-28 PROCEDURE — 99214 OFFICE O/P EST MOD 30 MIN: CPT | Performed by: FAMILY MEDICINE

## 2024-08-28 PROCEDURE — 3078F DIAST BP <80 MM HG: CPT | Performed by: FAMILY MEDICINE

## 2024-08-28 PROCEDURE — 93000 ELECTROCARDIOGRAM COMPLETE: CPT | Performed by: FAMILY MEDICINE

## 2024-08-28 NOTE — PROGRESS NOTES
friction rubs. 1/6 systolic murmur, 2nd intercostal space on the LEFT just lateral to the sternum  Pulmonary/Chest: Effort normal and breath sounds normal. No respiratory distress. He has no wheezes, rhonchi or rales. Abdominal: Soft, non-tender. Bowel sounds and aorta are normal. He exhibits no organomegaly, mass or bruit.   Extremities: Trace 1/2 way up. No cyanosis or clubbing. 2+ radial and carotid pulses. Distal extremity pulses: 2+ bilaterally.  Neurological: He is alert and oriented to person. No evidence of gross cranial nerve deficit. Coordination appeared normal.   Skin: Skin is warm and dry. There is no rash or diaphoresis.   Psychiatric: He has a normal mood and affect. His speech is normal and behavior is normal.      MOST RECENT LABS ON RECORD:   Lab Results   Component Value Date    WBC 7.5 05/15/2023    HGB 12.4 (L) 05/15/2023    HCT 38.3 (L) 05/15/2023     05/15/2023    CHOL 160 03/17/2023    TRIG 73 03/17/2023    HDL 37 (L) 03/17/2023    ALT 23 05/15/2023    AST 11 05/15/2023     05/22/2023    K 3.8 05/22/2023     05/22/2023    CREATININE 0.63 (L) 05/22/2023    BUN 20 05/22/2023    CO2 29 05/22/2023    TSH 1.29 03/17/2023    LABA1C 5.6 07/20/2021       ASSESSMENT:     1. Chronic diastolic congestive heart failure (HCC)    2. SOB (shortness of breath)    3. Mild CAD    4. Primary hypertension    5. COLLETTE (obstructive sleep apnea)    6. Morbid obesity (HCC)    7. Chronic knee pain, unspecified laterality      PLAN:        Chronic diastolic heart failure:  Most likely secondary to uncontrolled hypertension   Beta Blocker: Continue Carvedilol (Coreg) 25 mg twice daily.   ACE Inibitor/ARB: Continue losartan/HCTZ (Hyzaar) 100/25 mg every morning.  Diuretics: Continue furosemide (Lasix) 20 mg every morning.  Heart failure counseling: I told them to start wearing lower extremity compression stockings and I advised them to try and keep their legs up whenever possible and to limit salt in  8/28/2025). However, I would be happy to see him sooner should the need arise.    Sincerely,  Donte Hansen MD, MS, F.[email or web address].  [facility] Cardiology Specialist    [address] [place] [id number]  Phone: [phone], Fax: [phone]     I believe that the risk of significant morbidity and mortality related to the patient's current medical conditions are: Intermediate. At least 30 minutes were spent during prep work, discussion and exam of the patient, and follow up documentation and all of their questions were answered.    The documentation recorded by the scribe, accurately and completely reflects the services I personally performed and the decisions made by me. Donte Hansen MD, MS, F.[email or web address]. August 28, 2024

## 2025-02-20 ENCOUNTER — OFFICE VISIT (OUTPATIENT)
Dept: PRIMARY CARE CLINIC | Age: 55
End: 2025-02-20
Payer: COMMERCIAL

## 2025-02-20 DIAGNOSIS — E66.01 CLASS 3 SEVERE OBESITY DUE TO EXCESS CALORIES WITHOUT SERIOUS COMORBIDITY WITH BODY MASS INDEX (BMI) OF 45.0 TO 49.9 IN ADULT: ICD-10-CM

## 2025-02-20 DIAGNOSIS — Z00.00 ANNUAL WELLNESS VISIT: Primary | ICD-10-CM

## 2025-02-20 DIAGNOSIS — Z12.5 SCREENING PSA (PROSTATE SPECIFIC ANTIGEN): ICD-10-CM

## 2025-02-20 DIAGNOSIS — Z76.89 ENCOUNTER TO ESTABLISH CARE: ICD-10-CM

## 2025-02-20 DIAGNOSIS — E66.813 CLASS 3 SEVERE OBESITY DUE TO EXCESS CALORIES WITHOUT SERIOUS COMORBIDITY WITH BODY MASS INDEX (BMI) OF 45.0 TO 49.9 IN ADULT: ICD-10-CM

## 2025-02-20 DIAGNOSIS — Z13.220 LIPID SCREENING: ICD-10-CM

## 2025-02-20 DIAGNOSIS — I10 PRIMARY HYPERTENSION: ICD-10-CM

## 2025-02-20 DIAGNOSIS — Z12.11 COLON CANCER SCREENING: ICD-10-CM

## 2025-02-20 PROBLEM — I50.33 ACUTE ON CHRONIC DIASTOLIC HEART FAILURE (HCC): Status: ACTIVE | Noted: 2025-02-20

## 2025-02-20 PROBLEM — U07.1 PULMONARY EMBOLISM ASSOCIATED WITH COVID-19 (HCC): Status: RESOLVED | Noted: 2021-09-24 | Resolved: 2025-02-20

## 2025-02-20 PROBLEM — R94.39 ABNORMAL RESULT OF OTHER CARDIOVASCULAR FUNCTION STUDY: Status: RESOLVED | Noted: 2023-05-04 | Resolved: 2025-02-20

## 2025-02-20 PROBLEM — G47.33 OBSTRUCTIVE SLEEP APNEA SYNDROME: Status: ACTIVE | Noted: 2025-02-20

## 2025-02-20 PROBLEM — M17.9 OSTEOARTHRITIS OF KNEE: Status: ACTIVE | Noted: 2025-02-20

## 2025-02-20 PROBLEM — I26.99 PULMONARY EMBOLISM ASSOCIATED WITH COVID-19 (HCC): Status: RESOLVED | Noted: 2021-09-24 | Resolved: 2025-02-20

## 2025-02-20 PROBLEM — K85.10 PANCREATITIS, GALLSTONE: Status: RESOLVED | Noted: 2021-07-18 | Resolved: 2025-02-20

## 2025-02-20 PROBLEM — I82.401 DEEP VEIN THROMBOSIS OF RIGHT LOWER EXTREMITY (HCC): Status: RESOLVED | Noted: 2021-09-24 | Resolved: 2025-02-20

## 2025-02-20 PROCEDURE — 99396 PREV VISIT EST AGE 40-64: CPT | Performed by: NURSE PRACTITIONER

## 2025-02-20 SDOH — ECONOMIC STABILITY: FOOD INSECURITY: WITHIN THE PAST 12 MONTHS, YOU WORRIED THAT YOUR FOOD WOULD RUN OUT BEFORE YOU GOT MONEY TO BUY MORE.: NEVER TRUE

## 2025-02-20 SDOH — ECONOMIC STABILITY: FOOD INSECURITY: WITHIN THE PAST 12 MONTHS, THE FOOD YOU BOUGHT JUST DIDN'T LAST AND YOU DIDN'T HAVE MONEY TO GET MORE.: NEVER TRUE

## 2025-02-20 ASSESSMENT — ENCOUNTER SYMPTOMS
WHEEZING: 0
EYE PAIN: 0
BACK PAIN: 0
ABDOMINAL PAIN: 0
EYE REDNESS: 0
SINUS PRESSURE: 0
TROUBLE SWALLOWING: 0
NAUSEA: 0
SHORTNESS OF BREATH: 0
EYE DISCHARGE: 0
VOMITING: 0
DIARRHEA: 0
COUGH: 0
RHINORRHEA: 0
SINUS PAIN: 0
EYE ITCHING: 0
SORE THROAT: 0
APNEA: 1
CONSTIPATION: 0

## 2025-02-20 ASSESSMENT — PATIENT HEALTH QUESTIONNAIRE - PHQ9
5. POOR APPETITE OR OVEREATING: SEVERAL DAYS
SUM OF ALL RESPONSES TO PHQ QUESTIONS 1-9: 6
10. IF YOU CHECKED OFF ANY PROBLEMS, HOW DIFFICULT HAVE THESE PROBLEMS MADE IT FOR YOU TO DO YOUR WORK, TAKE CARE OF THINGS AT HOME, OR GET ALONG WITH OTHER PEOPLE: NOT DIFFICULT AT ALL
SUM OF ALL RESPONSES TO PHQ QUESTIONS 1-9: 6
SUM OF ALL RESPONSES TO PHQ QUESTIONS 1-9: 6
3. TROUBLE FALLING OR STAYING ASLEEP: NOT AT ALL
4. FEELING TIRED OR HAVING LITTLE ENERGY: MORE THAN HALF THE DAYS
SUM OF ALL RESPONSES TO PHQ9 QUESTIONS 1 & 2: 3
6. FEELING BAD ABOUT YOURSELF - OR THAT YOU ARE A FAILURE OR HAVE LET YOURSELF OR YOUR FAMILY DOWN: NOT AT ALL
8. MOVING OR SPEAKING SO SLOWLY THAT OTHER PEOPLE COULD HAVE NOTICED. OR THE OPPOSITE, BEING SO FIGETY OR RESTLESS THAT YOU HAVE BEEN MOVING AROUND A LOT MORE THAN USUAL: NOT AT ALL
SUM OF ALL RESPONSES TO PHQ QUESTIONS 1-9: 6
2. FEELING DOWN, DEPRESSED OR HOPELESS: NOT AT ALL
9. THOUGHTS THAT YOU WOULD BE BETTER OFF DEAD, OR OF HURTING YOURSELF: NOT AT ALL
1. LITTLE INTEREST OR PLEASURE IN DOING THINGS: NEARLY EVERY DAY
7. TROUBLE CONCENTRATING ON THINGS, SUCH AS READING THE NEWSPAPER OR WATCHING TELEVISION: NOT AT ALL

## 2025-02-20 ASSESSMENT — ANXIETY QUESTIONNAIRES
IF YOU CHECKED OFF ANY PROBLEMS ON THIS QUESTIONNAIRE, HOW DIFFICULT HAVE THESE PROBLEMS MADE IT FOR YOU TO DO YOUR WORK, TAKE CARE OF THINGS AT HOME, OR GET ALONG WITH OTHER PEOPLE: NOT DIFFICULT AT ALL
3. WORRYING TOO MUCH ABOUT DIFFERENT THINGS: NOT AT ALL
5. BEING SO RESTLESS THAT IT IS HARD TO SIT STILL: NOT AT ALL
1. FEELING NERVOUS, ANXIOUS, OR ON EDGE: NOT AT ALL
6. BECOMING EASILY ANNOYED OR IRRITABLE: NOT AT ALL
GAD7 TOTAL SCORE: 0
7. FEELING AFRAID AS IF SOMETHING AWFUL MIGHT HAPPEN: NOT AT ALL
2. NOT BEING ABLE TO STOP OR CONTROL WORRYING: NOT AT ALL
4. TROUBLE RELAXING: NOT AT ALL

## 2025-02-20 NOTE — PROGRESS NOTES
Kashif.  Discussed use, benefit, and side effects of prescribed medications.    Barriers to medication compliance addressed.    Reviewed recommended screenings and vaccines.  Kashif received counseling on healthy lifestyle during this visit.  Educational materials are furnished - see patient instructions  AVS provided.  All patient questions answered and Kashif voiced understanding.     Return in about 1 year (around 2/20/2026) for wellness.     If symptoms fail to improve or worsen rapidly, Kashif is encouraged to report to an ED for prompt evaluation and treatment.    Electronically signed by KEARA Bonner CNP on 2/20/2025 at 4:27 PM

## 2025-03-21 LAB — NONINV COLON CA DNA+OCC BLD SCRN STL QL: NEGATIVE

## 2025-03-23 ENCOUNTER — RESULTS FOLLOW-UP (OUTPATIENT)
Dept: PRIMARY CARE CLINIC | Age: 55
End: 2025-03-23

## 2025-04-08 RX ORDER — FUROSEMIDE 20 MG/1
20 TABLET ORAL DAILY
Qty: 90 TABLET | Refills: 3 | Status: SHIPPED | OUTPATIENT
Start: 2025-04-08

## 2025-04-16 RX ORDER — LOSARTAN POTASSIUM AND HYDROCHLOROTHIAZIDE 25; 100 MG/1; MG/1
1 TABLET ORAL DAILY
Qty: 90 TABLET | Refills: 3 | Status: SHIPPED | OUTPATIENT
Start: 2025-04-16

## 2025-04-16 RX ORDER — CARVEDILOL 25 MG/1
25 TABLET ORAL 2 TIMES DAILY
Qty: 180 TABLET | Refills: 3 | Status: SHIPPED | OUTPATIENT
Start: 2025-04-16

## 2025-06-25 ENCOUNTER — HOSPITAL ENCOUNTER (OUTPATIENT)
Age: 55
Discharge: HOME | End: 2025-06-25
Payer: COMMERCIAL

## 2025-06-25 LAB
BUN SERPL-MCNC: 17 MG/DL (ref 6–20)
CREAT SERPL-MCNC: 0.8 MG/DL (ref 0.7–1.2)
GFR, ESTIMATED: >90 ML/MIN/1.73M2

## 2025-06-25 PROCEDURE — 84520 ASSAY OF UREA NITROGEN: CPT

## 2025-06-25 PROCEDURE — 82565 ASSAY OF CREATININE: CPT

## 2025-06-25 PROCEDURE — 36415 COLL VENOUS BLD VENIPUNCTURE: CPT

## (undated) DEVICE — SUTURE PROL SZ 3-0 L30IN NONABSORBABLE BLU L26MM SH 1/2 CIR 8832H

## (undated) DEVICE — BAG SPEC REM 224ML W4XL6IN DIA10MM 1 HND GYN DISP ENDOPCH

## (undated) DEVICE — PLUMEPORT LAPAROSCOPIC SMOKE FILTRATION DEVICE: Brand: PLUMEPORT ACTIV

## (undated) DEVICE — 3M™ PRECISE™ VISTA DISPOSABLE SKIN STAPLER 3995: Brand: 3M™ PRECISE™

## (undated) DEVICE — GLOVE ORANGE PI 7 1/2   MSG9075

## (undated) DEVICE — ARM DRAPE

## (undated) DEVICE — SYRINGE MED 10ML LUERLOCK TIP W/O SFTY DISP

## (undated) DEVICE — TROCARS: Brand: KII® BALLOON BLUNT TIP SYSTEM

## (undated) DEVICE — SOLUTION IV IRRIG POUR BRL 0.9% SODIUM CHL 2F7124

## (undated) DEVICE — PENCIL ES L3M BTTN SWCH HOLSTER W/ BLDE ELECTRD EDGE

## (undated) DEVICE — SUCTION IRRIGATOR: Brand: ENDOWRIST

## (undated) DEVICE — BLADELESS OBTURATOR: Brand: WECK VISTA

## (undated) DEVICE — DRAPE,UTILTY,TAPE,15X26, 4EA/PK: Brand: MEDLINE

## (undated) DEVICE — 40595 XL TRENDELENBURG POSITIONING KIT: Brand: 40595 XL TRENDELENBURG POSITIONING KIT

## (undated) DEVICE — Device

## (undated) DEVICE — DRESSING SURESITE-123PLUSPAD 2.4 IN X2.8 IN

## (undated) DEVICE — CANNULA SEAL

## (undated) DEVICE — SUTURE SZ 0 27IN 5/8 CIR UR-6  TAPER PT VIOLET ABSRB VICRYL J603H

## (undated) DEVICE — 3M™ TEGADERM™ +PAD FILM DRESSING WITH NON-ADHERENT PAD, 3587, 3-1/2 IN X 4-1/8 IN (9 CM X 10.5 CM), 25/CAR, 4 CAR/CS: Brand: 3M™ TEGADERM™

## (undated) DEVICE — SOLUTION IV 1000ML 0.9% SOD CHL FOR IRRIG PLAS CONT

## (undated) DEVICE — WARMER SCP 2 ANTIFOG LAP DISP

## (undated) DEVICE — INSUFFLATION NEEDLE TO ESTABLISH PNEUMOPERITONEUM.: Brand: INSUFFLATION NEEDLE

## (undated) DEVICE — BLADELESS OBTURATOR, LONG: Brand: WECK VISTA

## (undated) DEVICE — CLIP INT L POLYMER LOK LIG HEM O LOK